# Patient Record
Sex: MALE | Race: WHITE | Employment: FULL TIME | URBAN - METROPOLITAN AREA
[De-identification: names, ages, dates, MRNs, and addresses within clinical notes are randomized per-mention and may not be internally consistent; named-entity substitution may affect disease eponyms.]

---

## 2021-05-21 ENCOUNTER — HOSPITAL ENCOUNTER (EMERGENCY)
Age: 51
Discharge: HOME | End: 2021-05-21
Attending: EMERGENCY MEDICINE

## 2021-05-21 VITALS
WEIGHT: 235 LBS | RESPIRATION RATE: 17 BRPM | BODY MASS INDEX: 31.83 KG/M2 | OXYGEN SATURATION: 97 % | SYSTOLIC BLOOD PRESSURE: 172 MMHG | HEIGHT: 72 IN | HEART RATE: 87 BPM | TEMPERATURE: 98.1 F | DIASTOLIC BLOOD PRESSURE: 92 MMHG

## 2021-05-21 DIAGNOSIS — I83.93 VARICOSE VEINS OF BOTH LOWER EXTREMITIES, UNSPECIFIED WHETHER COMPLICATED: Primary | ICD-10-CM

## 2021-05-21 DIAGNOSIS — I10 ASYMPTOMATIC HYPERTENSION: ICD-10-CM

## 2021-05-21 PROCEDURE — 99283 EMERGENCY DEPT VISIT LOW MDM: CPT

## 2021-05-21 RX ORDER — BACITRACIN, NEOMYCIN, POLYMYXIN B 400; 3.5; 5 [USP'U]/G; MG/G; [USP'U]/G
OINTMENT TOPICAL
Qty: 1 TUBE | Refills: 0 | Status: SHIPPED | OUTPATIENT
Start: 2021-05-21 | End: 2021-05-31

## 2021-05-21 NOTE — ED PROVIDER NOTES
(Non-Medical):    Physical Activity:     Days of Exercise per Week:     Minutes of Exercise per Session:    Stress:     Feeling of Stress :    Social Connections:     Frequency of Communication with Friends and Family:     Frequency of Social Gatherings with Friends and Family:     Attends [employer] Services:     Active Member of Clubs or Organizations:     Attends Club or Organization Meetings:     Marital Status:    Intimate Partner Violence:     Fear of Current or Ex-Partner:     Emotionally Abused:     Physically Abused:     Sexually Abused:      No current facility-administered medications for this encounter. Current Outpatient Medications   Medication Sig Dispense Refill    neomycin-bacitracin-polymyxin (NEOSPORIN) 400-5-5000 ointment Apply topically 2 times daily. 1 Tube 0     No Known Allergies    Nursing Notes Reviewed    Physical Exam:  ED Triage Vitals [05/21/21 1142]   Enc Vitals Group      BP (!) 182/100      Pulse 87      Resp 17      Temp 98.1 °F (36.7 °C)      Temp src       SpO2 97 %      Weight 235 lb (106.6 kg)      Height 6' (1.829 m)      Head Circumference       Peak Flow       Pain Score       Pain Loc       Pain Edu? Excl. in [address]? My pulse ox interpretation is - normal    General appearance:  No acute distress. Sitting comfortably in bed. Well-appearing. Pleasant. Skin:  Warm. Dry. Patient does have diffuse varicose veins to bilateral lower extremities. There is one punctate area on the right lateral leg with a scab present where there was a recent bleed. No active bleeding or open wounds or sores. Eye:  Extraocular movements intact. Ears, nose, mouth and throat:  Oral mucosa moist   Extremity:  No swelling. Normal ROM. Varicose vein findings as above. Heart:  Strong and symmetric peripheral pulses. Extremities are well perfused. Abdomen:  Non-distended. Respiratory:  Respirations nonlabored. Neurological:  Alert and oriented times 3.   No focal neuro deficits. Sensation intact to light touch to distal upper/lower extremities; 5/5 and symmetric  and dorsi/plantar flexion. I have reviewed and interpreted all of the currently available lab results from this visit (if applicable):  No results found for this visit on 05/21/21. Radiographs (if obtained):  [] The following radiograph was interpreted by myself in the absence of a radiologist:   [] Radiologist's Report Reviewed:  No orders to display         EKG (if obtained): (All EKG's are interpreted by myself in the absence of a cardiologist)    Chart review shows recent radiographs:  No results found. MDM:  Pt presents as above. Emergent conditions considered. Presentation prompted initial history and physical.  Presentation consistent with asymptomatic varicose veins and asymptomatic hypertension. Patient will be referred to vascular surgery for further evaluation. There are no red flag features prompting more emergent work-up at this time with respect to his veins or his hypertension. Patient will need recheck as an outpatient. Work excuse provided. I discussed specific signs and symptoms and when to return to the emergency department as well as need for close outpatient follow-up. Questions sought and answered with the patient. They voice understanding and agree with plan. Clinical Impression:  1. Varicose veins of both lower extremities, unspecified whether complicated    2. Asymptomatic hypertension      Disposition referral (if applicable):  Cherry Sanders MD  [address] [place] [id number]  [phone]    Schedule an appointment as soon as possible for a visit   (vascular surgery)    [address]  [phone]  Today  If symptoms worsen    Disposition medications (if applicable):  New Prescriptions    NEOMYCIN-BACITRACIN-POLYMYXIN (NEOSPORIN) 400-5-5000 OINTMENT    Apply topically 2 times daily. Comment: Please note this report has been produced using speech recognition software and may contain errors related to that system including errors in grammar, punctuation, and spelling, as well as words and phrases that may be inappropriate. If there are any questions or concerns please feel free to contact the dictating provider for clarification.          Gem Howell MD  05/21/21 9215

## 2021-05-21 NOTE — ED NOTES
Discharge instructions given with prescription verbalized understanding pt is ambulatory out       Jazmín Tena RN  05/21/21 5071

## 2021-05-21 NOTE — ED TRIAGE NOTES
Pt arrived via triage with c/o varicose veins. Pt reports they are getting larger and his work is concerned. Pt reports a couple have broken and bleed. Pt arrives with one area scabbed and healing. Pt denies any previous treatment. Pt denies pain.

## 2021-05-21 NOTE — LETTER
[address] [address]  Phone: [phone]  Fax: [phone]    No name on file. May 21, 2021     Patient: Tenisha Hardy   YOB: 1970   Date of Visit: 5/21/2021       To Whom It May Concern: It is my medical opinion that Tenisha Hardy may return to work on 05/22/21 with the following restrictions: avoid excessive walking or standing . Limited walking until he can be cleared by vascular surgery. Avoid trauma to the legs. If you have any questions or concerns, please don't hesitate to call.     Sincerely,      Robbi Piña MD

## 2021-07-24 ENCOUNTER — HOSPITAL ENCOUNTER (EMERGENCY)
Age: 51
Discharge: HOME | End: 2021-07-24
Attending: EMERGENCY MEDICINE
Payer: COMMERCIAL

## 2021-07-24 ENCOUNTER — APPOINTMENT (OUTPATIENT)
Dept: GENERAL RADIOLOGY | Age: 51
End: 2021-07-24
Payer: COMMERCIAL

## 2021-07-24 VITALS
HEIGHT: 72 IN | DIASTOLIC BLOOD PRESSURE: 87 MMHG | HEART RATE: 85 BPM | SYSTOLIC BLOOD PRESSURE: 145 MMHG | OXYGEN SATURATION: 94 % | BODY MASS INDEX: 31.83 KG/M2 | WEIGHT: 235 LBS | RESPIRATION RATE: 22 BRPM | TEMPERATURE: 96.7 F

## 2021-07-24 DIAGNOSIS — S82.832A CLOSED FRACTURE OF DISTAL END OF LEFT FIBULA, UNSPECIFIED FRACTURE MORPHOLOGY, INITIAL ENCOUNTER: Primary | ICD-10-CM

## 2021-07-24 PROCEDURE — 96374 THER/PROPH/DIAG INJ IV PUSH: CPT

## 2021-07-24 PROCEDURE — 29505 APPLICATION LONG LEG SPLINT: CPT

## 2021-07-24 PROCEDURE — 2580000003 HC RX 258: Performed by: EMERGENCY MEDICINE

## 2021-07-24 PROCEDURE — 6360000002 HC RX W HCPCS: Performed by: EMERGENCY MEDICINE

## 2021-07-24 PROCEDURE — 73610 X-RAY EXAM OF ANKLE: CPT

## 2021-07-24 PROCEDURE — 99284 EMERGENCY DEPT VISIT MOD MDM: CPT

## 2021-07-24 PROCEDURE — 73600 X-RAY EXAM OF ANKLE: CPT

## 2021-07-24 PROCEDURE — 96375 TX/PRO/DX INJ NEW DRUG ADDON: CPT

## 2021-07-24 RX ORDER — 0.9 % SODIUM CHLORIDE 0.9 %
1000 INTRAVENOUS SOLUTION INTRAVENOUS ONCE
Status: COMPLETED | OUTPATIENT
Start: 2021-07-24 | End: 2021-07-24

## 2021-07-24 RX ORDER — FENTANYL CITRATE 50 UG/ML
50 INJECTION, SOLUTION INTRAMUSCULAR; INTRAVENOUS ONCE
Status: COMPLETED | OUTPATIENT
Start: 2021-07-24 | End: 2021-07-24

## 2021-07-24 RX ORDER — PROPOFOL 10 MG/ML
1000 INJECTION, EMULSION INTRAVENOUS ONCE
Status: COMPLETED | OUTPATIENT
Start: 2021-07-24 | End: 2021-07-24

## 2021-07-24 RX ORDER — PROPOFOL 10 MG/ML
INJECTION, EMULSION INTRAVENOUS DAILY PRN
Status: COMPLETED | OUTPATIENT
Start: 2021-07-24 | End: 2021-07-24

## 2021-07-24 RX ORDER — ACETAMINOPHEN AND CODEINE PHOSPHATE 300; 30 MG/1; MG/1
1 TABLET ORAL EVERY 4 HOURS PRN
Qty: 15 TABLET | Refills: 0 | Status: SHIPPED | OUTPATIENT
Start: 2021-07-24 | End: 2021-07-28

## 2021-07-24 RX ADMIN — PROPOFOL 100 MG: 10 INJECTION, EMULSION INTRAVENOUS at 17:35

## 2021-07-24 RX ADMIN — FENTANYL CITRATE 50 MCG: 50 INJECTION, SOLUTION INTRAMUSCULAR; INTRAVENOUS at 17:32

## 2021-07-24 RX ADMIN — SODIUM CHLORIDE 1000 ML: 9 INJECTION, SOLUTION INTRAVENOUS at 17:32

## 2021-07-24 RX ADMIN — PROPOFOL 200 MG: 10 INJECTION, EMULSION INTRAVENOUS at 17:33

## 2021-07-24 ASSESSMENT — PAIN DESCRIPTION - FREQUENCY: FREQUENCY: INTERMITTENT

## 2021-07-24 ASSESSMENT — PAIN SCALES - GENERAL
PAINLEVEL_OUTOF10: 8
PAINLEVEL_OUTOF10: 8

## 2021-07-24 ASSESSMENT — PAIN DESCRIPTION - ORIENTATION: ORIENTATION: LEFT

## 2021-07-24 ASSESSMENT — PAIN DESCRIPTION - PAIN TYPE: TYPE: ACUTE PAIN

## 2021-07-24 ASSESSMENT — PAIN DESCRIPTION - LOCATION: LOCATION: KNEE

## 2021-07-24 ASSESSMENT — PAIN DESCRIPTION - DESCRIPTORS: DESCRIPTORS: ACHING

## 2021-07-24 NOTE — ED PROVIDER NOTES
EMERGENCY DEPARTMENT ENCOUNTER      CHIEF COMPLAINT:   Left ankle pain    HPI: Lior Porter is a 54 y.o. male who presents to the Emergency Department complaining of left ankle pain. The patient states that he was walking downstairs last night when he missed the last step and fell. He had immediate pain to the left his left ankle. It has been increasingly painful and swollen throughout the day. It is achy and throbbing. The pain is constant. It is worse with movement and better with rest.  He denies any associated numbness, tingling or weakness. He did not hit his head, lose consciousness or sustain any other injury. He denies neck or back pain. He has been using crutches at home to ambulate and cannot bear weight. The patient denies fevers, chills, chest pain, shortness of breath, abdominal pain, numbness, tingling, weakness, or any other complaints. REVIEW OF SYSTEMS:  CONSTITUTIONAL:  Denies fever, chills, weight loss or weakness  EYES:  Denies photophobia or discharge  ENT:  Denies sore throat or ear pain  CARDIOVASCULAR:  Denies chest pain, palpitations or swelling  RESPIRATORY:  Denies cough or shortness of breath  GI: Denies abdominal pain, nausea, vomiting, or diarrhea  MUSCULOSKELETAL: See HPI  SKIN:  No rash  NEUROLOGIC:  Denies headache, focal weakness or sensory changes  All systems negative except as marked. \"Remaining review of systems reviewed and negative. I have reviewed the nursing triage documentation and agree unless otherwise noted below. \"    PAST MEDICAL HISTORY:   History reviewed. No pertinent past medical history. CURRENT MEDICATIONS:   Home medications reviewed. SURGICAL HISTORY:   History reviewed. No pertinent surgical history. FAMILY HISTORY:   No family history on file.     SOCIAL HISTORY:   Social History     Socioeconomic History    Marital status:      Spouse name: Not on file    Number of children: Not on file    Years of education: Not on file    Highest education level: Not on file   Occupational History    Not on file   Tobacco Use    Smoking status: Never Smoker    Smokeless tobacco: Never Used   Vaping Use    Vaping Use: Never used   Substance and Sexual Activity    Alcohol use: Not on file    Drug use: Never    Sexual activity: Not on file   Other Topics Concern    Not on file   Social History Narrative    Not on file     Social Determinants of Health     Financial Resource Strain:     Difficulty of Paying Living Expenses:    Food Insecurity:     Worried About Running Out of Food in the Last Year:     [address] in the Last Year:    Transportation Needs:     Lack of Transportation (Medical):  Lack of Transportation (Non-Medical):    Physical Activity:     Days of Exercise per Week:     Minutes of Exercise per Session:    Stress:     Feeling of Stress :    Social Connections:     Frequency of Communication with Friends and Family:     Frequency of Social Gatherings with Friends and Family:     Attends Orthodox Services:     Active Member of Clubs or Organizations:     Attends Club or Organization Meetings:     Marital Status:    Intimate Partner Violence:     Fear of Current or Ex-Partner:     Emotionally Abused:     Physically Abused:     Sexually Abused: ALLERGIES: Patient has no known allergies. PHYSICAL EXAM:  VITAL SIGNS:   ED Triage Vitals [07/24/21 1457]   Enc Vitals Group      BP (!) 168/93      Pulse 99      Resp 14      Temp 96.7 °F (35.9 °C)      Temp Source Skin      SpO2 96 %      Weight 235 lb (106.6 kg)      Height 6' (1.829 m)      Head Circumference       Peak Flow       Pain Score       Pain Loc       Pain Edu? Excl. in [address]?       Constitutional:  Non-toxic appearance  HENT: Normocephalic, Atraumatic  Eyes: PERRL, conjunctiva normal   Neck: Normal range of motion, No tenderness, Supple, No stridor, No lymphadenopathy  Cardiovascular:  Normal heart rate, Normal rhythm  Pulmonary/Chest: Normal breath sounds, No respiratory distress, No wheezing  Abdomen: Bowel sounds normal, Soft, No tenderness, No masses, No pulsatile masses  Back:  No tenderness, No CVA tenderness  Extremities: There is diffuse swelling of the left lateral and medial ankle, there is venous discoloration to both ankles, the peripheral pulses are strong, Capillary refill is brisk, there is normal motor and sensory function, the foot is pink, warm, and well perfused, there is no cyanosis  Skin:  Warm, Dry, No erythema, No rash      EKG:     None    Radiology / Procedures:  Conscious Sedation Procedure Note  Indication: ankle fracture  Consent: I have discussed with the patient and/or the patient representative the indication, alternatives, and the possible risks and/or complications of the planned procedure and the anesthesia methods. The patient and/or patient representative appear to understand and agree to proceed. Physician Involvement: The attending physician was present and supervising this procedure. Pre-Sedation Documentation and Exam: I have personally completed a history, physical exam & review of systems for this patient (see notes). Airway Assessment: normal, dentition not prohibitive, Mallampati Class II - (soft palate, fauces & uvula are visible)    Prior History of Anesthesia Complications: none    ASA Classification: Class 1 - A normal healthy patient    Sedation/ Anesthesia Plan: intravenous sedation    Medications Used: fentanyl intravenously and propofol intravenously  Monitoring and Safety: The patient was placed on a cardiac monitor and vital signs, pulse oximetry and level of consciousness were continuously evaluated throughout the procedure. The patient was closely monitored until recovery from the medications was complete and the patient had returned to baseline status. Respiratory therapy was on standby at all times during the procedure.     (The following sections must be completed)  Post-Sedation Vital Signs: Vital signs were reviewed and were stable after the procedure (see flow sheet for vitals)     Post-Sedation Exam: Lungs: clear to auscultation bilaterally and Cardiovascular: normal, regular rate and rhythm        Complications: none    Posterior leg splint with a footplate and sugar tong was placed on the left lower extremity by me with assistance. The patient was neurovascularly intact prior to and after splint placement.            XR ANKLE LEFT (2 VIEWS) (Final result)  Result time 07/24/21 18:11:16  Procedure changed from XR ANKLE LEFT (MIN 3 VIEWS)  Final result by Nova Herbert MD (07/24/21 18:11:16)                Impression:    Similar distal fibular fracture with slightly improved alignment of the   mortise though there is persistent widening of the medial clear space.              Narrative:    EXAMINATION:   2 XRAY VIEWS OF THE LEFT ANKLE     7/24/2021 5:50 pm     COMPARISON:   07/24/2021     HISTORY:   ORDERING SYSTEM PROVIDED HISTORY: post reduction   TECHNOLOGIST PROVIDED HISTORY:   Reason for exam:->post reduction   Reason for Exam: post reduction   Acuity: Acute   Type of Exam: Subsequent/Follow-up   Mechanism of Injury: twisted ankle going down stairs     FINDINGS:   Similar distal fibular fracture with slightly improved alignment of the   mortise the there is persistent widening of the medial clear space.  The   talar dome appears intact.  Similar soft tissue swelling.                     XR ANKLE LEFT (MIN 3 VIEWS) (Final result)  Result time 07/24/21 16:31:29  Final result by Marielle Menezes MD (07/24/21 16:31:29)                Impression:    Acute traumatic displaced and mildly comminuted lateral malleolar fracture at   the level of the distal syndesmosis/tibiotalar joint.  Associated disruption   of the ankle mortise with widening of the medial clear space compatible with   ligamentous injury.  There also appear to be some small likely acute avulsion   fracture fragments in the medial clear space adjacent to the medial talus. Associated diffuse soft tissue swelling/subcutaneous edema about the distal   leg and ankle.  No radiopaque foreign bodies or soft tissue gas. Narrative:    EXAMINATION:   THREE XRAY VIEWS OF THE LEFT ANKLE     7/24/2021 3:24 pm     COMPARISON:   None. HISTORY:   ORDERING SYSTEM PROVIDED HISTORY: trauma, left ankle pain   TECHNOLOGIST PROVIDED HISTORY:   Reason for exam:->trauma, left ankle pain   Reason for Exam: trauma, left ankle pain   Acuity: Acute   Type of Exam: Initial   Mechanism of Injury: states that he missed the last step earlier this am and   twisted his left ankle   Relevant Medical/Surgical History: left ankle pain and swelling     FINDINGS:   Acute traumatic displaced and mildly comminuted lateral malleolar fracture at   the level of the distal syndesmosis/tibiotalar joint.  Disruption of the   ankle mortise relationships with widening of the medial clear space.  There   are some tiny ossifications noted in the medial clear space likely reflecting   small avulsion fracture fragments, potentially acute.  No dislocations.  No   suspicious focal bony lesions. No degenerative changes noted. Soft tissue swelling and subcutaneous edema diffusely about the distal leg   and ankle.  No radiopaque foreign bodies or soft tissue gas. ED COURSE & MEDICAL DECISION MAKING:  Pertinent Labs & Imaging studies reviewed. (See chart for details)  On exam, the patient is afebrile and nontoxic appearing. He is hypertensive with no known history of hypertension but is asymptomatic and is instructed to have this rechecked as an outpatient. He is otherwise hemodynamically stable and neurologically intact. X-rays of the left ankle show a displaced, comminuted lateral malleolar fracture at the level of the distal syndesmosis/tibiotalar joint.   There is associated disruption of the ankle mortise with widening of the medial clear space compatible with ligamentous injury. There also appears to be a small avulsion fracture fragment in the medial clear space adjacent to the medial talus. The patient underwent procedural sedation and closed reduction with improvement of alignment. He was placed in a splint by me and was neurovascularly intact prior to and after splint placement. I suspect that the patient had a mechanical fall and sustained a closed fracture of the distal end of the left fibula. . I have a low suspicion for open fracture, dislocation, compartment syndrome, necrotizing fasciitis, or neurovascular injury. I feel that the patient is stable for outpatient management with follow up in 2-3 days. I discussed the case with the orthopedic surgeon on-call, Dr. Kenn Shin, who will see him in close follow-up. He is to be nonweightbearing. RICE is advised. He is to return to the Emergency Department immediately for increased pain, swelling, redness, warmth, numbness, tingling, or weakness. The patient verbalized understanding, was agreeable with plan, and the patient was discharged home in stable condition. Clinical Impression:  1. Closed fracture of distal end of left fibula, unspecified fracture morphology, initial encounter        Disposition referral (if applicable):  Wilma Mendoza MD  [address]  [phone]    Schedule an appointment as soon as possible for a visit in 3 days      [facility] [id number] [facility]  [phone]  Go to   If symptoms worsen      Disposition medications (if applicable):  Discharge Medication List as of 7/24/2021  6:13 PM      START taking these medications    Details   acetaminophen-codeine (TYLENOL/CODEINE #3) 300-30 MG per tablet Take 1 tablet by mouth every 4 hours as needed for Pain for up to 4 days. Intended supply: 3 days.  Take lowest dose possible to manage pain, Disp-15 tablet, R-0Normal Comment: Please note this report has been produced using speech recognition software and may contain errors related to that system including errors in grammar, punctuation, and spelling, as well as words and phrases that may be inappropriate. If there are any questions or concerns please feel free to contact the dictating provider for clarification.         Vickye Mcardle, MD  07/24/21 2029

## 2021-07-24 NOTE — ED TRIAGE NOTES
Pt arrives with complaints of left ankle injury.  He states that he missed the last step earlier this am and twisted his left ankle, swelling noted

## 2021-07-27 ENCOUNTER — OFFICE VISIT (OUTPATIENT)
Dept: ORTHOPEDIC SURGERY | Age: 51
End: 2021-07-27
Payer: COMMERCIAL

## 2021-07-27 VITALS
RESPIRATION RATE: 16 BRPM | HEIGHT: 72 IN | HEART RATE: 100 BPM | BODY MASS INDEX: 31.83 KG/M2 | OXYGEN SATURATION: 98 % | WEIGHT: 235 LBS

## 2021-07-27 DIAGNOSIS — S82.62XA CLOSED DISPLACED FRACTURE OF LATERAL MALLEOLUS OF LEFT FIBULA, INITIAL ENCOUNTER: Primary | ICD-10-CM

## 2021-07-27 PROCEDURE — 99203 OFFICE O/P NEW LOW 30 MIN: CPT | Performed by: ORTHOPAEDIC SURGERY

## 2021-07-27 NOTE — PATIENT INSTRUCTIONS
We will schedule surgery at soonest convenience. If you have any questions regarding your surgery please call our office and ask to speak with Radha.  [phone]    Surgery: left ankle ORIF   Date:_____________________

## 2021-07-27 NOTE — PROGRESS NOTES
Patient presents to the office today for ED FU of the left ankle fx and dislocation DOI 7/24/21. Pt states he fell down the stairs and twisted his left ankle. Pt states he instantly had pain in the left ankle. Pain today is a 3/10 he has been nonweightbearing on the left ankle. Pt states pain is along the medial aspect of the left ankle.

## 2021-07-28 ENCOUNTER — ANESTHESIA EVENT (OUTPATIENT)
Dept: OPERATING ROOM | Age: 51
End: 2021-07-28
Payer: COMMERCIAL

## 2021-07-28 PROBLEM — S82.62XA CLOSED DISPLACED FRACTURE OF LATERAL MALLEOLUS OF LEFT FIBULA: Status: ACTIVE | Noted: 2021-07-28

## 2021-07-28 ASSESSMENT — ENCOUNTER SYMPTOMS
COLOR CHANGE: 0
CHEST TIGHTNESS: 0
VOMITING: 0
EYE REDNESS: 0
EYE PAIN: 0
WHEEZING: 0
SHORTNESS OF BREATH: 0

## 2021-07-28 NOTE — PROGRESS NOTES
7/27/2021   Chief Complaint   Patient presents with    Ankle Injury     left ankle distal fibula fx, dislocation DOI 7/24/21        History of Present Illness:                             Cesia Gonzalez is a 54 y.o. male who presents today for evaluation of his left ankle pain and swelling. He had an injury on 7/24/2021 when he was walking down the stairs and twisted awkwardly onto his left ankle. He had immediate severe pain and swelling with inability to bear weight. He was seen in emergency room and x-rays were taken which revealed a unstable fracture of his ankle. He had a closed reduction maneuver performed and application of a splint. Is here today for further evaluation. No history of previous injuries to the ankle. He ambulated well prior to the fall. Pain is diffuse throughout the ankle but most significant on the lateral aspect. He reports no other injuries or areas of pain. Patient presents to the office today for ED FU of the left ankle fx and dislocation DOI 7/24/21. Pt states he fell down the stairs and twisted his left ankle. Pt states he instantly had pain in the left ankle. Pain today is a 3/10 he has been nonweightbearing on the left ankle. Pt states pain is along the medial aspect of the left ankle. Medical History  Patient's medications, allergies, past medical, surgical, social and family histories were reviewed and updated as appropriate. No past medical history on file. No past surgical history on file. No family history on file.   Social History     Socioeconomic History    Marital status:      Spouse name: Not on file    Number of children: Not on file    Years of education: Not on file    Highest education level: Not on file   Occupational History    Not on file   Tobacco Use    Smoking status: Never Smoker    Smokeless tobacco: Never Used   Vaping Use    Vaping Use: Never used   Substance and Sexual Activity    Alcohol use: Not on file    Drug use: Never    Sexual activity: Not on file   Other Topics Concern    Not on file   Social History Narrative    Not on file     Social Determinants of Health     Financial Resource Strain:     Difficulty of Paying Living Expenses:    Food Insecurity:     Worried About Running Out of Food in the Last Year:     [address] in the Last Year:    Transportation Needs:     Lack of Transportation (Medical):  Lack of Transportation (Non-Medical):    Physical Activity:     Days of Exercise per Week:     Minutes of Exercise per Session:    Stress:     Feeling of Stress :    Social Connections:     Frequency of Communication with Friends and Family:     Frequency of Social Gatherings with Friends and Family:     Attends [employer] Services:     Active Member of Clubs or Organizations:     Attends Club or Organization Meetings:     Marital Status:    Intimate Partner Violence:     Fear of Current or Ex-Partner:     Emotionally Abused:     Physically Abused:     Sexually Abused:      Current Outpatient Medications   Medication Sig Dispense Refill    acetaminophen-codeine (TYLENOL/CODEINE #3) 300-30 MG per tablet Take 1 tablet by mouth every 4 hours as needed for Pain for up to 4 days. Intended supply: 3 days. Take lowest dose possible to manage pain 15 tablet 0     No current facility-administered medications for this visit. No Known Allergies      Review of Systems   Constitutional: Negative for chills and fever. HENT: Negative for congestion and sneezing. Eyes: Negative for pain and redness. Respiratory: Negative for chest tightness, shortness of breath and wheezing. Cardiovascular: Negative for chest pain and palpitations. Gastrointestinal: Negative for vomiting. Musculoskeletal: Positive for arthralgias. Skin: Negative for color change and rash. Neurological: Negative for weakness and numbness. Psychiatric/Behavioral: Negative for agitation.  The patient is not nervous/anxious. Examination:  General Exam:  Vitals: Pulse 100   Resp 16   Ht 6' (1.829 m)   Wt 235 lb (106.6 kg)   SpO2 98%   BMI 31.87 kg/m²    Physical Exam  Vitals and nursing note reviewed. Constitutional:       Appearance: Normal appearance. HENT:      Head: Normocephalic and atraumatic. Eyes:      Conjunctiva/sclera: Conjunctivae normal.      Pupils: Pupils are equal, round, and reactive to light. Pulmonary:      Effort: Pulmonary effort is normal.   Musculoskeletal:      Cervical back: Normal range of motion. Left knee: No swelling, effusion or ecchymosis. Normal range of motion. No tenderness. Right ankle: No swelling, deformity, ecchymosis or lacerations. No tenderness. Normal range of motion. Normal pulse. Right Achilles Tendon: Normal.      Comments: Left lower extremity:  There is tenderness to palpation diffusely throughout the ankle medially and laterally but severe maximal tenderness overlying the lateral malleolus. There is moderate diffuse soft tissue swelling present medially and laterally at the ankle. There is ecchymosis present at the ankle both medially and laterally. No tenderness to palpation at the proximal leg. Mild tenderness over the anterior syndesmosis. Range of motion deferred due to known fracture. Brisk cap refill and 2+ DP pulse present. Sensation is intact to light touch but decreased due to swelling throughout the foot. There is mild deformity of the ankle from the displacement of the fracture. Skin is intact with no open wounds or lesions. No tenderness to palpation at the knee. Skin:     General: Skin is warm and dry. Neurological:      Mental Status: He is alert and oriented to person, place, and time.    Psychiatric:         Mood and Affect: Mood normal.         Behavior: Behavior normal.            Diagnostic testing:  X-ray images were reviewed by myself and discussed with the patient:  Her images before and after reduction from 7/24/2021 in the ER reviewed by myself discussed with the patient:  Impression   Acute traumatic displaced and mildly comminuted lateral malleolar fracture at   the level of the distal syndesmosis/tibiotalar joint.  Associated disruption   of the ankle mortise with widening of the medial clear space compatible with   ligamentous injury.  There also appear to be some small likely acute avulsion   fracture fragments in the medial clear space adjacent to the medial talus.       Associated diffuse soft tissue swelling/subcutaneous edema about the distal   leg and ankle.  No radiopaque foreign bodies or soft tissue gas.             Office Procedures:  No orders of the defined types were placed in this encounter. Assessment and Plan  1. Left ankle displaced comminuted distal fibula fracture, bimalleolar equivalent    I reviewed the x-ray findings in detail with the patient. I have explained that the fracture pattern and displacement may lead to complications with nonoperative treatment. Specifically I am concerned about malalignment and posttraumatic arthritis of the ankle joint. Discussed possibility for syndesmotic disruption and possible need to stabilize with syndesmotic screws. Discussed potential need for hardware removal in the future. We discussed the potential for medial deltoid ligament repair if there is instability at the medial aspect of the ankle following fixation of the fibula fracture. Therefore I have recommended open reduction and internal fixation to restore the anatomy of the ankle bones and articular surfaces. We have discussed the risks, benefits, and alternatives to surgery. We discussed the goals of surgery and anticipated recovery process. We discussed specific pertinent risks including delayed fracture healing, nonunion, refracture, posttraumatic arthritis, infection, and DVT.     The patient understands and wishes to proceed with surgical intervention.       Electronically signed by Juan Carlos Hernández MD on 7/28/2021 at 9:50 AM

## 2021-07-29 ASSESSMENT — LIFESTYLE VARIABLES: SMOKING_STATUS: 0

## 2021-07-29 NOTE — PROGRESS NOTES
Called Dr Armsa Drafts office to see if they have another # for this pt, told to call wife Waylon Long at [phone]- msg line- did not identify self so just left [facility] [place], [place] to call us back about surgical procedure for 7/30/2021

## 2021-07-29 NOTE — PROGRESS NOTES
Attempted phone assessment- message line- did leave msg surg 7/30 @ 0730 with arrival at [address] [facility] after midnight bring insurance an picture ID, covid vaccination card, med list

## 2021-07-29 NOTE — ANESTHESIA PRE PROCEDURE
Department of Anesthesiology  Preprocedure Note       Name:  Lubna Brice   Age:  54 y.o.  :  1970                                          MRN:  [id number]         Date:  2021      Surgeon: Paula Ruiz):  Saima Beasley MD    Procedure: Procedure(s):  LEFT ANKLE OPEN REDUCTION INTERNAL FIXATION    Medications prior to admission:   Prior to Admission medications    Not on File       Current medications:    No current facility-administered medications for this encounter. No current outpatient medications on file. Allergies:  No Known Allergies    Problem List:    Patient Active Problem List   Diagnosis Code    Closed displaced fracture of lateral malleolus of left fibula S82. 62XA       Past Medical History:        Diagnosis Date    Fracture     in ER 2021 fx left ankle- for surg 2021       Past Surgical History:  No past surgical history on file. Social History:    Social History     Tobacco Use    Smoking status: Never Smoker    Smokeless tobacco: Never Used   Substance Use Topics    Alcohol use: Not on file                                Counseling given: Not Answered      Vital Signs (Current): There were no vitals filed for this visit. BP Readings from Last 3 Encounters:   21 (!) 145/87   21 (!) 172/92       NPO Status:                                                                                 BMI:   Wt Readings from Last 3 Encounters:   21 235 lb (106.6 kg)   21 235 lb (106.6 kg)   21 235 lb (106.6 kg)     There is no height or weight on file to calculate BMI.    CBC: No results found for: WBC, RBC, HGB, HCT, MCV, RDW, PLT    CMP: No results found for: NA, K, CL, CO2, BUN, CREATININE, GFRAA, AGRATIO, LABGLOM, GLUCOSE, PROT, CALCIUM, BILITOT, ALKPHOS, AST, ALT    POC Tests: No results for input(s): POCGLU, POCNA, POCK, POCCL, POCBUN, POCHEMO, POCHCT in the last 72 hours.     Coags: No results found for: PROTIME, INR, APTT    HCG (If Applicable): No results found for: PREGTESTUR, PREGSERUM, HCG, HCGQUANT     ABGs: No results found for: PHART, PO2ART, FKR8VLC, NMM6SPH, BEART, X3RYOZSU     Type & Screen (If Applicable):  No results found for: LABABO, LABRH    Drug/Infectious Status (If Applicable):  No results found for: HIV, HEPCAB    COVID-19 Screening (If Applicable): No results found for: COVID19        Anesthesia Evaluation  Patient summary reviewed and Nursing notes reviewed no history of anesthetic complications:   Airway: Mallampati: II  TM distance: >3 FB   Neck ROM: full  Mouth opening: > = 3 FB Dental:          Pulmonary:Negative Pulmonary ROS and normal exam    (+) sleep apnea:      (-) not a current smoker                           Cardiovascular:  Exercise tolerance: good (>4 METS),            Beta Blocker:  Not on Beta Blocker         Neuro/Psych:   Negative Neuro/Psych ROS              GI/Hepatic/Renal: Neg GI/Hepatic/Renal ROS            Endo/Other: Negative Endo/Other ROS                    Abdominal:             Vascular: negative vascular ROS. Other Findings:           Anesthesia Plan      general     ASA 2     (+COVID vaccine  Chart review complete 07/29/2021)  Induction: intravenous. MIPS: Postoperative opioids intended. Anesthetic plan and risks discussed with patient. Plan discussed with CRNA.     Attending anesthesiologist reviewed and agrees with Pre Eval content          KISHA Arnold - CRNA   7/29/2021

## 2021-07-30 ENCOUNTER — HOSPITAL ENCOUNTER (OUTPATIENT)
Age: 51
Setting detail: OUTPATIENT SURGERY
Discharge: HOME | End: 2021-07-30
Attending: ORTHOPAEDIC SURGERY | Admitting: ORTHOPAEDIC SURGERY
Payer: COMMERCIAL

## 2021-07-30 ENCOUNTER — ANESTHESIA (OUTPATIENT)
Dept: OPERATING ROOM | Age: 51
End: 2021-07-30
Payer: COMMERCIAL

## 2021-07-30 ENCOUNTER — TELEPHONE (OUTPATIENT)
Dept: ORTHOPEDIC SURGERY | Age: 51
End: 2021-07-30

## 2021-07-30 ENCOUNTER — APPOINTMENT (OUTPATIENT)
Dept: GENERAL RADIOLOGY | Age: 51
End: 2021-07-30
Attending: ORTHOPAEDIC SURGERY
Payer: COMMERCIAL

## 2021-07-30 VITALS
OXYGEN SATURATION: 98 % | TEMPERATURE: 97.3 F | DIASTOLIC BLOOD PRESSURE: 79 MMHG | SYSTOLIC BLOOD PRESSURE: 157 MMHG | HEIGHT: 72 IN | RESPIRATION RATE: 16 BRPM | WEIGHT: 230 LBS | BODY MASS INDEX: 31.15 KG/M2 | HEART RATE: 94 BPM

## 2021-07-30 VITALS
RESPIRATION RATE: 1 BRPM | SYSTOLIC BLOOD PRESSURE: 135 MMHG | TEMPERATURE: 98.6 F | OXYGEN SATURATION: 96 % | DIASTOLIC BLOOD PRESSURE: 82 MMHG

## 2021-07-30 DIAGNOSIS — S82.842A BIMALLEOLAR FRACTURE OF LEFT ANKLE, CLOSED, INITIAL ENCOUNTER: Primary | ICD-10-CM

## 2021-07-30 PROBLEM — S82.62XA CLOSED DISPLACED FRACTURE OF LATERAL MALLEOLUS OF LEFT FIBULA: Status: RESOLVED | Noted: 2021-07-28 | Resolved: 2021-07-30

## 2021-07-30 LAB
ALBUMIN SERPL-MCNC: 4.1 GM/DL (ref 3.4–5)
ALP BLD-CCNC: 130 IU/L (ref 40–128)
ALT SERPL-CCNC: 29 U/L (ref 10–40)
ANION GAP SERPL CALCULATED.3IONS-SCNC: 16 MMOL/L (ref 4–16)
APTT: 30.8 SECONDS (ref 25.1–37.1)
AST SERPL-CCNC: 34 IU/L (ref 15–37)
BILIRUB SERPL-MCNC: 0.6 MG/DL (ref 0–1)
BUN BLDV-MCNC: 6 MG/DL (ref 6–23)
CALCIUM SERPL-MCNC: 9.3 MG/DL (ref 8.3–10.6)
CHLORIDE BLD-SCNC: 89 MMOL/L (ref 99–110)
CO2: 27 MMOL/L (ref 21–32)
CREAT SERPL-MCNC: 0.5 MG/DL (ref 0.9–1.3)
GFR AFRICAN AMERICAN: >60 ML/MIN/1.73M2
GFR NON-AFRICAN AMERICAN: >60 ML/MIN/1.73M2
GLUCOSE BLD-MCNC: 120 MG/DL (ref 70–99)
HCT VFR BLD CALC: 53.1 % (ref 42–52)
HEMOGLOBIN: 18 GM/DL (ref 13.5–18)
INR BLD: 0.88 INDEX
MCH RBC QN AUTO: 33.1 PG (ref 27–31)
MCHC RBC AUTO-ENTMCNC: 33.9 % (ref 32–36)
MCV RBC AUTO: 97.8 FL (ref 78–100)
PDW BLD-RTO: 14.2 % (ref 11.7–14.9)
PLATELET # BLD: 274 K/CU MM (ref 140–440)
PMV BLD AUTO: 9.2 FL (ref 7.5–11.1)
POTASSIUM SERPL-SCNC: 5 MMOL/L (ref 3.5–5.1)
PROTHROMBIN TIME: 11.4 SECONDS (ref 11.7–14.5)
RBC # BLD: 5.43 M/CU MM (ref 4.6–6.2)
SODIUM BLD-SCNC: 132 MMOL/L (ref 135–145)
TOTAL PROTEIN: 7.4 GM/DL (ref 6.4–8.2)
WBC # BLD: 7 K/CU MM (ref 4–10.5)

## 2021-07-30 PROCEDURE — 3700000000 HC ANESTHESIA ATTENDED CARE: Performed by: ORTHOPAEDIC SURGERY

## 2021-07-30 PROCEDURE — 3600000014 HC SURGERY LEVEL 4 ADDTL 15MIN: Performed by: ORTHOPAEDIC SURGERY

## 2021-07-30 PROCEDURE — 6370000000 HC RX 637 (ALT 250 FOR IP): Performed by: ANESTHESIOLOGY

## 2021-07-30 PROCEDURE — 76000 FLUOROSCOPY <1 HR PHYS/QHP: CPT

## 2021-07-30 PROCEDURE — 7100000001 HC PACU RECOVERY - ADDTL 15 MIN: Performed by: ORTHOPAEDIC SURGERY

## 2021-07-30 PROCEDURE — 27814 TREATMENT OF ANKLE FRACTURE: CPT | Performed by: ORTHOPAEDIC SURGERY

## 2021-07-30 PROCEDURE — 7100000000 HC PACU RECOVERY - FIRST 15 MIN: Performed by: ORTHOPAEDIC SURGERY

## 2021-07-30 PROCEDURE — 6360000002 HC RX W HCPCS: Performed by: NURSE ANESTHETIST, CERTIFIED REGISTERED

## 2021-07-30 PROCEDURE — 7100000010 HC PHASE II RECOVERY - FIRST 15 MIN: Performed by: ORTHOPAEDIC SURGERY

## 2021-07-30 PROCEDURE — 2580000003 HC RX 258: Performed by: ANESTHESIOLOGY

## 2021-07-30 PROCEDURE — C1713 ANCHOR/SCREW BN/BN,TIS/BN: HCPCS | Performed by: ORTHOPAEDIC SURGERY

## 2021-07-30 PROCEDURE — 85027 COMPLETE CBC AUTOMATED: CPT

## 2021-07-30 PROCEDURE — 85610 PROTHROMBIN TIME: CPT

## 2021-07-30 PROCEDURE — 2720000010 HC SURG SUPPLY STERILE: Performed by: ORTHOPAEDIC SURGERY

## 2021-07-30 PROCEDURE — 3700000001 HC ADD 15 MINUTES (ANESTHESIA): Performed by: ORTHOPAEDIC SURGERY

## 2021-07-30 PROCEDURE — 2709999900 HC NON-CHARGEABLE SUPPLY: Performed by: ORTHOPAEDIC SURGERY

## 2021-07-30 PROCEDURE — 7100000011 HC PHASE II RECOVERY - ADDTL 15 MIN: Performed by: ORTHOPAEDIC SURGERY

## 2021-07-30 PROCEDURE — 6360000002 HC RX W HCPCS: Performed by: ORTHOPAEDIC SURGERY

## 2021-07-30 PROCEDURE — 80053 COMPREHEN METABOLIC PANEL: CPT

## 2021-07-30 PROCEDURE — 3600000004 HC SURGERY LEVEL 4 BASE: Performed by: ORTHOPAEDIC SURGERY

## 2021-07-30 PROCEDURE — 85730 THROMBOPLASTIN TIME PARTIAL: CPT

## 2021-07-30 DEVICE — SCREW BNE L22MM DIA3.5MM CORT S STL ST NONCANNULATED LOK
Type: IMPLANTABLE DEVICE | Site: ANKLE | Status: NON-FUNCTIONAL
Removed: 2022-01-19

## 2021-07-30 DEVICE — SCREW BNE L10MM DIA2.7MM CORT S STL ST LOK FULL THRD T8
Type: IMPLANTABLE DEVICE | Site: ANKLE | Status: NON-FUNCTIONAL
Removed: 2022-01-19

## 2021-07-30 DEVICE — PLATE BNE L86MM 4 H ST L LAT DST FIBULAR S STL LOK COMPR
Type: IMPLANTABLE DEVICE | Site: ANKLE | Status: NON-FUNCTIONAL
Removed: 2022-01-19

## 2021-07-30 DEVICE — SCREW BNE L16MM DIA2.7MM CORT S STL ST LOK FULL THRD T8
Type: IMPLANTABLE DEVICE | Site: ANKLE | Status: NON-FUNCTIONAL
Removed: 2022-01-19

## 2021-07-30 DEVICE — SCREW BNE L14MM DIA2.7MM CORT S STL ST LOK FULL THRD T8
Type: IMPLANTABLE DEVICE | Site: ANKLE | Status: NON-FUNCTIONAL
Removed: 2022-01-19

## 2021-07-30 DEVICE — SCREW BNE L16MM DIA3.5MM CORT S STL ST LOK FULL THRD
Type: IMPLANTABLE DEVICE | Site: ANKLE | Status: NON-FUNCTIONAL
Removed: 2022-01-19

## 2021-07-30 DEVICE — SCREW BNE L16MM DIA3.5MM CORT S STL ST NONCANNULATED LOK
Type: IMPLANTABLE DEVICE | Site: ANKLE | Status: NON-FUNCTIONAL
Removed: 2022-01-19

## 2021-07-30 DEVICE — SCREW BNE L14MM DIA3.5MM CORT S STL ST NONCANNULATED LOK
Type: IMPLANTABLE DEVICE | Site: ANKLE | Status: NON-FUNCTIONAL
Removed: 2022-01-19

## 2021-07-30 RX ORDER — ONDANSETRON 2 MG/ML
4 INJECTION INTRAMUSCULAR; INTRAVENOUS
Status: DISCONTINUED | OUTPATIENT
Start: 2021-07-30 | End: 2021-07-30 | Stop reason: HOSPADM

## 2021-07-30 RX ORDER — DEXAMETHASONE SODIUM PHOSPHATE 4 MG/ML
INJECTION, SOLUTION INTRA-ARTICULAR; INTRALESIONAL; INTRAMUSCULAR; INTRAVENOUS; SOFT TISSUE PRN
Status: DISCONTINUED | OUTPATIENT
Start: 2021-07-30 | End: 2021-07-30 | Stop reason: SDUPTHER

## 2021-07-30 RX ORDER — ASPIRIN 325 MG
325 TABLET, DELAYED RELEASE (ENTERIC COATED) ORAL DAILY
Qty: 30 TABLET | Refills: 0 | Status: SHIPPED | OUTPATIENT
Start: 2021-07-30 | End: 2022-08-05

## 2021-07-30 RX ORDER — SODIUM CHLORIDE, SODIUM LACTATE, POTASSIUM CHLORIDE, CALCIUM CHLORIDE 600; 310; 30; 20 MG/100ML; MG/100ML; MG/100ML; MG/100ML
INJECTION, SOLUTION INTRAVENOUS CONTINUOUS
Status: DISCONTINUED | OUTPATIENT
Start: 2021-07-30 | End: 2021-07-30 | Stop reason: HOSPADM

## 2021-07-30 RX ORDER — HYDRALAZINE HYDROCHLORIDE 20 MG/ML
5 INJECTION INTRAMUSCULAR; INTRAVENOUS EVERY 10 MIN PRN
Status: DISCONTINUED | OUTPATIENT
Start: 2021-07-30 | End: 2021-07-30 | Stop reason: HOSPADM

## 2021-07-30 RX ORDER — ACETAMINOPHEN AND CODEINE PHOSPHATE 300; 30 MG/1; MG/1
1 TABLET ORAL EVERY 4 HOURS PRN
Qty: 30 TABLET | Refills: 0 | Status: SHIPPED | OUTPATIENT
Start: 2021-07-30 | End: 2021-08-06

## 2021-07-30 RX ORDER — 0.9 % SODIUM CHLORIDE 0.9 %
500 INTRAVENOUS SOLUTION INTRAVENOUS
Status: DISCONTINUED | OUTPATIENT
Start: 2021-07-30 | End: 2021-07-30 | Stop reason: HOSPADM

## 2021-07-30 RX ORDER — FENTANYL CITRATE 50 UG/ML
50 INJECTION, SOLUTION INTRAMUSCULAR; INTRAVENOUS EVERY 5 MIN PRN
Status: DISCONTINUED | OUTPATIENT
Start: 2021-07-30 | End: 2021-07-30 | Stop reason: HOSPADM

## 2021-07-30 RX ORDER — SODIUM CHLORIDE FOR INHALATION 0.9 %
VIAL, NEBULIZER (ML) INHALATION
Status: DISCONTINUED
Start: 2021-07-30 | End: 2021-07-30 | Stop reason: HOSPADM

## 2021-07-30 RX ORDER — PROPOFOL 10 MG/ML
INJECTION, EMULSION INTRAVENOUS PRN
Status: DISCONTINUED | OUTPATIENT
Start: 2021-07-30 | End: 2021-07-30 | Stop reason: SDUPTHER

## 2021-07-30 RX ORDER — CEFAZOLIN SODIUM 2 G/100ML
2000 INJECTION, SOLUTION INTRAVENOUS
Status: COMPLETED | OUTPATIENT
Start: 2021-07-30 | End: 2021-07-30

## 2021-07-30 RX ORDER — MEPERIDINE HYDROCHLORIDE 25 MG/ML
12.5 INJECTION INTRAMUSCULAR; INTRAVENOUS; SUBCUTANEOUS EVERY 5 MIN PRN
Status: DISCONTINUED | OUTPATIENT
Start: 2021-07-30 | End: 2021-07-30 | Stop reason: HOSPADM

## 2021-07-30 RX ORDER — LABETALOL HYDROCHLORIDE 5 MG/ML
5 INJECTION, SOLUTION INTRAVENOUS EVERY 10 MIN PRN
Status: DISCONTINUED | OUTPATIENT
Start: 2021-07-30 | End: 2021-07-30 | Stop reason: HOSPADM

## 2021-07-30 RX ORDER — DIPHENHYDRAMINE HYDROCHLORIDE 50 MG/ML
12.5 INJECTION INTRAMUSCULAR; INTRAVENOUS
Status: DISCONTINUED | OUTPATIENT
Start: 2021-07-30 | End: 2021-07-30 | Stop reason: HOSPADM

## 2021-07-30 RX ORDER — MIDAZOLAM HYDROCHLORIDE 1 MG/ML
INJECTION INTRAMUSCULAR; INTRAVENOUS PRN
Status: DISCONTINUED | OUTPATIENT
Start: 2021-07-30 | End: 2021-07-30 | Stop reason: SDUPTHER

## 2021-07-30 RX ORDER — LIDOCAINE HYDROCHLORIDE 20 MG/ML
INJECTION, SOLUTION INTRAVENOUS PRN
Status: DISCONTINUED | OUTPATIENT
Start: 2021-07-30 | End: 2021-07-30 | Stop reason: SDUPTHER

## 2021-07-30 RX ORDER — ONDANSETRON 2 MG/ML
INJECTION INTRAMUSCULAR; INTRAVENOUS PRN
Status: DISCONTINUED | OUTPATIENT
Start: 2021-07-30 | End: 2021-07-30 | Stop reason: SDUPTHER

## 2021-07-30 RX ORDER — HYDROMORPHONE HCL 110MG/55ML
0.5 PATIENT CONTROLLED ANALGESIA SYRINGE INTRAVENOUS EVERY 5 MIN PRN
Status: DISCONTINUED | OUTPATIENT
Start: 2021-07-30 | End: 2021-07-30 | Stop reason: HOSPADM

## 2021-07-30 RX ORDER — FENTANYL CITRATE 50 UG/ML
INJECTION, SOLUTION INTRAMUSCULAR; INTRAVENOUS PRN
Status: DISCONTINUED | OUTPATIENT
Start: 2021-07-30 | End: 2021-07-30 | Stop reason: SDUPTHER

## 2021-07-30 RX ORDER — HYDROCODONE BITARTRATE AND ACETAMINOPHEN 5; 325 MG/1; MG/1
2 TABLET ORAL EVERY 6 HOURS PRN
Status: COMPLETED | OUTPATIENT
Start: 2021-07-30 | End: 2021-07-30

## 2021-07-30 RX ORDER — HYDROCODONE BITARTRATE AND ACETAMINOPHEN 5; 325 MG/1; MG/1
1 TABLET ORAL PRN
Status: COMPLETED | OUTPATIENT
Start: 2021-07-30 | End: 2021-07-30

## 2021-07-30 RX ORDER — PROMETHAZINE HYDROCHLORIDE 25 MG/ML
6.25 INJECTION, SOLUTION INTRAMUSCULAR; INTRAVENOUS
Status: DISCONTINUED | OUTPATIENT
Start: 2021-07-30 | End: 2021-07-30 | Stop reason: HOSPADM

## 2021-07-30 RX ADMIN — FENTANYL CITRATE 25 MCG: 50 INJECTION, SOLUTION INTRAMUSCULAR; INTRAVENOUS at 08:11

## 2021-07-30 RX ADMIN — FENTANYL CITRATE 50 MCG: 50 INJECTION, SOLUTION INTRAMUSCULAR; INTRAVENOUS at 07:42

## 2021-07-30 RX ADMIN — FENTANYL CITRATE 25 MCG: 50 INJECTION, SOLUTION INTRAMUSCULAR; INTRAVENOUS at 08:41

## 2021-07-30 RX ADMIN — MIDAZOLAM 2 MG: 1 INJECTION INTRAMUSCULAR; INTRAVENOUS at 07:27

## 2021-07-30 RX ADMIN — DEXAMETHASONE SODIUM PHOSPHATE 8 MG: 4 INJECTION, SOLUTION INTRAMUSCULAR; INTRAVENOUS at 07:45

## 2021-07-30 RX ADMIN — LIDOCAINE HYDROCHLORIDE 100 MG: 20 INJECTION, SOLUTION INTRAVENOUS at 07:35

## 2021-07-30 RX ADMIN — FENTANYL CITRATE 50 MCG: 50 INJECTION, SOLUTION INTRAMUSCULAR; INTRAVENOUS at 07:57

## 2021-07-30 RX ADMIN — HYDROCODONE BITARTRATE AND ACETAMINOPHEN 1 TABLET: 5; 325 TABLET ORAL at 10:12

## 2021-07-30 RX ADMIN — FENTANYL CITRATE 50 MCG: 50 INJECTION, SOLUTION INTRAMUSCULAR; INTRAVENOUS at 07:33

## 2021-07-30 RX ADMIN — FENTANYL CITRATE 50 MCG: 50 INJECTION, SOLUTION INTRAMUSCULAR; INTRAVENOUS at 07:53

## 2021-07-30 RX ADMIN — CEFAZOLIN SODIUM 2000 MG: 2 INJECTION, SOLUTION INTRAVENOUS at 07:46

## 2021-07-30 RX ADMIN — PROPOFOL 200 MG: 10 INJECTION, EMULSION INTRAVENOUS at 07:35

## 2021-07-30 RX ADMIN — SODIUM CHLORIDE, POTASSIUM CHLORIDE, SODIUM LACTATE AND CALCIUM CHLORIDE: 600; 310; 30; 20 INJECTION, SOLUTION INTRAVENOUS at 07:24

## 2021-07-30 RX ADMIN — ONDANSETRON 4 MG: 2 INJECTION INTRAMUSCULAR; INTRAVENOUS at 08:33

## 2021-07-30 ASSESSMENT — PULMONARY FUNCTION TESTS
PIF_VALUE: 18
PIF_VALUE: 2
PIF_VALUE: 3
PIF_VALUE: 18
PIF_VALUE: 0
PIF_VALUE: 18
PIF_VALUE: 17
PIF_VALUE: 18
PIF_VALUE: 17
PIF_VALUE: 18
PIF_VALUE: 17
PIF_VALUE: 18
PIF_VALUE: 16
PIF_VALUE: 17
PIF_VALUE: 4
PIF_VALUE: 17
PIF_VALUE: 12
PIF_VALUE: 18
PIF_VALUE: 0
PIF_VALUE: 0
PIF_VALUE: 17
PIF_VALUE: 18
PIF_VALUE: 17
PIF_VALUE: 18
PIF_VALUE: 17
PIF_VALUE: 18
PIF_VALUE: 17
PIF_VALUE: 18
PIF_VALUE: 18
PIF_VALUE: 17
PIF_VALUE: 2
PIF_VALUE: 18
PIF_VALUE: 18
PIF_VALUE: 11
PIF_VALUE: 17
PIF_VALUE: 18
PIF_VALUE: 17
PIF_VALUE: 11
PIF_VALUE: 18
PIF_VALUE: 17
PIF_VALUE: 18
PIF_VALUE: 11
PIF_VALUE: 17
PIF_VALUE: 18
PIF_VALUE: 1
PIF_VALUE: 4
PIF_VALUE: 17
PIF_VALUE: 18
PIF_VALUE: 17
PIF_VALUE: 18
PIF_VALUE: 1
PIF_VALUE: 17
PIF_VALUE: 2
PIF_VALUE: 18
PIF_VALUE: 12
PIF_VALUE: 18
PIF_VALUE: 17
PIF_VALUE: 18
PIF_VALUE: 3
PIF_VALUE: 1
PIF_VALUE: 18
PIF_VALUE: 12
PIF_VALUE: 18
PIF_VALUE: 0
PIF_VALUE: 3
PIF_VALUE: 17
PIF_VALUE: 17
PIF_VALUE: 2
PIF_VALUE: 17
PIF_VALUE: 18
PIF_VALUE: 2
PIF_VALUE: 0
PIF_VALUE: 18
PIF_VALUE: 15
PIF_VALUE: 18
PIF_VALUE: 17
PIF_VALUE: 17

## 2021-07-30 ASSESSMENT — PAIN SCALES - GENERAL
PAINLEVEL_OUTOF10: 6
PAINLEVEL_OUTOF10: 2
PAINLEVEL_OUTOF10: 0
PAINLEVEL_OUTOF10: 0
PAINLEVEL_OUTOF10: 3

## 2021-07-30 ASSESSMENT — PAIN DESCRIPTION - ORIENTATION: ORIENTATION: LEFT

## 2021-07-30 ASSESSMENT — PAIN DESCRIPTION - PAIN TYPE
TYPE: SURGICAL PAIN
TYPE: SURGICAL PAIN

## 2021-07-30 ASSESSMENT — PAIN DESCRIPTION - LOCATION: LOCATION: ANKLE

## 2021-07-30 ASSESSMENT — PAIN DESCRIPTION - DESCRIPTORS: DESCRIPTORS: DISCOMFORT

## 2021-07-30 ASSESSMENT — PAIN DESCRIPTION - ONSET: ONSET: GRADUAL

## 2021-07-30 ASSESSMENT — PAIN DESCRIPTION - FREQUENCY: FREQUENCY: INTERMITTENT

## 2021-07-30 NOTE — TELEPHONE ENCOUNTER
I called [facility] at [phone] and spoke with Harish Starks.  I was informed that CPT [id number] was approved 7/27/21-10/27/21 Ref # [id number]

## 2021-07-30 NOTE — OP NOTE
Operative Note      Patient: Girish De Paz  YOB: 1970  MRN: [id number]    Date of Procedure: 7/30/2021    Pre-Op Diagnosis: Left ankle bimalleolar fracture    Post-Op Diagnosis: Same       Procedure(s):  LEFT ANKLE OPEN REDUCTION INTERNAL FIXATION bimalleolar fracture    Surgeon(s):  Stanislaw Contreras MD    Assistant:   * No surgical staff found *    Anesthesia: General    Estimated Blood Loss (mL): less than 50     Complications: None    Specimens:   * No specimens in log *    Implants:  Implant Name Type Inv. Item Serial No.  Lot No. LRB No. Used Action   PLATE BNE [id number] H ST L LAT DST FIBULAR S STL NITISH COMPR  PLATE BNE [id number] 4 H ST L LAT DST FIBULAR S STL NITISH COMPR  DEPUY [employer]THES [place]-WD [id number] Left 1 Implanted   SCREW BNE L22MM DIA3.5MM ART S STL ST NONCANNULATED NITISH  SCREW BNE L22MM DIA3.5MM ART S STL ST NONCANNULATED NITISH  DEPUY [employer]THES [place]-WD  Left 1 Implanted   SCREW BNE L14MM DIA3.5MM ART S STL ST NONCANNULATED NITISH  SCREW BNE L14MM DIA3.5MM ART S STL ST NONCANNULATED NITISH  DEPUY [employer]THES [place]-WD  Left 1 Implanted   SCREW BNE L16MM DIA3.5MM ART S STL ST NONCANNULATED NITISH  SCREW BNE L16MM DIA3.5MM ART S STL ST NONCANNULATED NITISH  DEPUY SYNTHES [place]-WD  Left 2 Implanted   SCREW BNE L14MM DIA2.7MM ART S STL ST NITISH FULL THRD T8  SCREW BNE L14MM DIA2.7MM ART S STL ST NITISH FULL THRD T8  DEPUY [place]-WD  Left 1 Implanted   SCREW BNE L16MM DIA2.7MM ART S STL ST NITISH FULL THRD T8  SCREW BNE L16MM DIA2.7MM ART S STL ST NITISH FULL THRD T8  DEPUY [place] [employer]-WD  Left 2 Implanted   SCREW BNE L10MM DIA2.7MM ART S STL ST NITISH FULL THRD T8  SCREW BNE L10MM DIA2.7MM ART S STL ST NITISH FULL THRD T8  DEPUY [employer] [place] [employer]-WD  Left 1 Implanted   SCREW BNE L16MM DIA3.5MM ART S STL ST NITISH FULL THRD  SCREW BNE L16MM DIA3.5MM ART S STL ST NITISH FULL THRD  [employer] [place]-WD  Left 2 Implanted         Drains: * No LDAs found *    Findings:     Detailed Description of Procedure:    The patient was identified in the preoperative area and the site was marked. He was taken back to the operating room placed supine on the table. After induction of general endotracheal anesthesia, the left lower extremity was prepped and draped in sterile fashion with a thigh tourniquet. Timeout was performed and preoperative antibiotics were given. The leg was exsanguinated and tourniquet was then inflated to 300 mmHg and deflated at the conclusion of the case after less than 1 hour of tourniquet time. X-rays were taken which revealed a displaced ankle fracture with lateral translation of the talus relative to the tibial plafond and small avulsion fracture fragment at the tip of the medial malleolus    A lateral lateral incision was made over the ankle overlying the distal fibula dissection was carried down to subcu tissues and bleeding was controlled with Bovie. Care was taken to protect protect branches of the superficial peroneal nerve. The fracture Site was encountered and hematoma and soft tissue was cleared from the fracture site. There was comminution present anteriorly at the distal extent tension of the fracture site. The ankle was reduced anatomically with multiple reduction clamps and confirmed with the C arm. A interfragmentary lag screw was placed across the main fracture site with a 22mm cortical 3.5 millimeter screw. Next a 4 hole Synthes distal fibular locking plate was contoured to fit the lateral border of the fibula and set in position along its height and rotation with a cortical screw in the proximal shaft segment. Next 5 locking screws were placed in the distal fragment and additional cortical and locking screws were placed in the shaft completing fixation across the fracture site.  The comminution anteriorly at the fracture site was in stable position and well aligned and required no further fixation x-rays were taken with stress examination under live fluoroscopy which revealed no widening of the syndesmosis. Excellent stability across the fracture site. The small medial malleolar fracture fragment was in good position and no further fixation was necessary immediately. The wound was thoroughly irrigated and the deep layers were closed over the plate with 2-0 Vicryl suture in subcutaneous tissues were closed with buried 2-0 Vicryl and skin was closed with interrupted 3-0 nylon. Sterile dressing was applied with Xeroform and 4 x 8's and ABD. The tourniquet was deflated during wound closure and bleeding was well controlled. Sterile web roll and a well-padded posterior sugar tong splint was applied with an Ace wrap. Patient was extubated taken to PACU in stable condition. All sponge and needle counts were correct. Postoperative plan:  Patient be nonweightbearing in a splint and follow-up in my office in 10 to 14 days for suture removal and x-rays. We will transition him to a boot with early partial weightbearing at that time and range of motion exercises.     Electronically signed by Wilma Mendoza MD on 7/30/2021 at 9:08 AM

## 2021-07-30 NOTE — PROGRESS NOTES
4799 Patient arrived to PACU from OR. Monitors applied and alarms on. No drainage from site. Ice applied and left ankle. Report from [facility].   3011 Patient turned side to side in bed.   5101 Patient transferred out of same day surgery. Report to [facility].

## 2021-07-30 NOTE — H&P
Chief Complaint   Patient presents with    Ankle Injury       left ankle distal fibula fx, dislocation DOI 7/24/21         History of Present Illness:                             Debra Richard is a 54 y.o. male who presents today for evaluation of his left ankle pain and swelling. He had an injury on 7/24/2021 when he was walking down the stairs and twisted awkwardly onto his left ankle. He had immediate severe pain and swelling with inability to bear weight. He was seen in emergency room and x-rays were taken which revealed a unstable fracture of his ankle. He had a closed reduction maneuver performed and application of a splint. Is here today for further evaluation.     No history of previous injuries to the ankle. He ambulated well prior to the fall. Pain is diffuse throughout the ankle but most significant on the lateral aspect. He reports no other injuries or areas of pain.     Patient presents to the office today for ED FU of the left ankle fx and dislocation DOI 7/24/21. Pt states he fell down the stairs and twisted his left ankle. Pt states he instantly had pain in the left ankle. Pain today is a 3/10 he has been nonweightbearing on the left ankle. Pt states pain is along the medial aspect of the left ankle.            Medical History  Patient's medications, allergies, past medical, surgical, social and family histories were reviewed and updated as appropriate.     Past Medical History   No past medical history on file. Past Surgical History   No past surgical history on file. Family History   No family history on file.      Social History   Social History            Socioeconomic History    Marital status:        Spouse name: Not on file    Number of children: Not on file    Years of education: Not on file    Highest education level: Not on file   Occupational History    Not on file   Tobacco Use    Smoking status: Never Smoker    Smokeless tobacco: Never Used   Vaping Use    Vaping Use: Never used   Substance and Sexual Activity    Alcohol use: Not on file    Drug use: Never    Sexual activity: Not on file   Other Topics Concern    Not on file   Social History Narrative    Not on file      Social Determinants of Health      Financial Resource Strain:     Difficulty of Paying Living Expenses:    Food Insecurity:     Worried About Running Out of Food in the Last Year:     [address] in the Last Year:    Transportation Needs:     Lack of Transportation (Medical):  Lack of Transportation (Non-Medical):    Physical Activity:     Days of Exercise per Week:     Minutes of Exercise per Session:    Stress:     Feeling of Stress :    Social Connections:     Frequency of Communication with Friends and Family:     Frequency of Social Gatherings with Friends and Family:     Attends Judaism Services:     Active Member of Clubs or Organizations:     Attends Club or Organization Meetings:     Marital Status:    Intimate Partner Violence:     Fear of Current or Ex-Partner:     Emotionally Abused:     Physically Abused:     Sexually Abused:          Current Facility-Administered Medications          Current Outpatient Medications   Medication Sig Dispense Refill    acetaminophen-codeine (TYLENOL/CODEINE #3) 300-30 MG per tablet Take 1 tablet by mouth every 4 hours as needed for Pain for up to 4 days. Intended supply: 3 days. Take lowest dose possible to manage pain 15 tablet 0      No current facility-administered medications for this visit.         No Known Allergies        Review of Systems   Constitutional: Negative for chills and fever. HENT: Negative for congestion and sneezing. Eyes: Negative for pain and redness. Respiratory: Negative for chest tightness, shortness of breath and wheezing. Cardiovascular: Negative for chest pain and palpitations. Gastrointestinal: Negative for vomiting. Musculoskeletal: Positive for arthralgias.    Skin: Negative for color change and rash. Neurological: Negative for weakness and numbness. Psychiatric/Behavioral: Negative for agitation. The patient is not nervous/anxious.                                                  Examination:  General Exam:  Vitals: Pulse 100   Resp 16   Ht 6' (1.829 m)   Wt 235 lb (106.6 kg)   SpO2 98%   BMI 31.87 kg/m²    Physical Exam  Vitals and nursing note reviewed. Constitutional:       Appearance: Normal appearance. HENT:      Head: Normocephalic and atraumatic. Eyes:      Conjunctiva/sclera: Conjunctivae normal.      Pupils: Pupils are equal, round, and reactive to light. Pulmonary:      Effort: Pulmonary effort is normal.   Musculoskeletal:      Cervical back: Normal range of motion. Left knee: No swelling, effusion or ecchymosis. Normal range of motion. No tenderness. Right ankle: No swelling, deformity, ecchymosis or lacerations. No tenderness. Normal range of motion. Normal pulse. Right Achilles Tendon: Normal.      Comments: Left lower extremity:  There is tenderness to palpation diffusely throughout the ankle medially and laterally but severe maximal tenderness overlying the lateral malleolus. There is moderate diffuse soft tissue swelling present medially and laterally at the ankle. There is ecchymosis present at the ankle both medially and laterally. No tenderness to palpation at the proximal leg. Mild tenderness over the anterior syndesmosis. Range of motion deferred due to known fracture. Brisk cap refill and 2+ DP pulse present. Sensation is intact to light touch but decreased due to swelling throughout the foot. There is mild deformity of the ankle from the displacement of the fracture. Skin is intact with no open wounds or lesions. No tenderness to palpation at the knee. Skin:     General: Skin is warm and dry. Neurological:      Mental Status: He is alert and oriented to person, place, and time.    Psychiatric:         Mood and Affect: Mood normal.         Behavior: Behavior normal.               Diagnostic testing:  X-ray images were reviewed by myself and discussed with the patient:  Her images before and after reduction from 7/24/2021 in the ER reviewed by myself discussed with the patient:  Impression   Acute traumatic displaced and mildly comminuted lateral malleolar fracture at   the level of the distal syndesmosis/tibiotalar joint.  Associated disruption   of the ankle mortise with widening of the medial clear space compatible with   ligamentous injury.  There also appear to be some small likely acute avulsion   fracture fragments in the medial clear space adjacent to the medial talus.       Associated diffuse soft tissue swelling/subcutaneous edema about the distal   leg and ankle.  No radiopaque foreign bodies or soft tissue gas.                Office Procedures:  No orders of the defined types were placed in this encounter.        Assessment and Plan  1. Left ankle displaced comminuted distal fibula fracture, bimalleolar equivalent     I reviewed the x-ray findings in detail with the patient. I have explained that the fracture pattern and displacement may lead to complications with nonoperative treatment. Specifically I am concerned about malalignment and posttraumatic arthritis of the ankle joint.       Discussed possibility for syndesmotic disruption and possible need to stabilize with syndesmotic screws. Discussed potential need for hardware removal in the future. We discussed the potential for medial deltoid ligament repair if there is instability at the medial aspect of the ankle following fixation of the fibula fracture.     Therefore I have recommended open reduction and internal fixation to restore the anatomy of the ankle bones and articular surfaces. We have discussed the risks, benefits, and alternatives to surgery. We discussed the goals of surgery and anticipated recovery process.   We discussed specific pertinent risks including delayed fracture healing, nonunion, refracture, posttraumatic arthritis, infection, and DVT.     The patient understands and wishes to proceed with surgical intervention. History and Physical exam note from the office visit is copied above from epic. I have reviewed the note and examined the patient and find no relevant changes.      I have reviewed with the patient and/or family the risks, benefits, and alternatives to the procedure as well as expected postoperative course    Azalia Blanca MD, MD

## 2021-07-30 NOTE — ANESTHESIA POSTPROCEDURE EVALUATION
Department of Anesthesiology  Postprocedure Note    Patient: Zoran Covert  MRN: [id number]  YOB: 1970  Date of evaluation: 7/30/2021  Time:  9:17 AM     Procedure Summary     Date: 07/30/21 Room / Location: [address] [place] [address]    Anesthesia Start: 3588 Anesthesia Stop: 5818    Procedure: LEFT ANKLE OPEN REDUCTION INTERNAL FIXATION (Left Ankle) Diagnosis:       Other closed fracture of distal end of left fibula, initial encounter      (Other closed fracture of distal end of left fibula, initial encounter [U82.008B])    Surgeons: Azalia Blanca MD Responsible Provider: Elder Storey MD    Anesthesia Type: general ASA Status: 2          Anesthesia Type: general    Natalie Phase I: Natalie Score: 8    Natalie Phase II:      Last vitals: Reviewed and per EMR flowsheets.        Anesthesia Post Evaluation    Patient location during evaluation: PACU  Patient participation: complete - patient participated  Level of consciousness: awake and alert  Pain score: 3  Airway patency: patent  Nausea & Vomiting: no vomiting and no nausea  Complications: no  Cardiovascular status: blood pressure returned to baseline and hemodynamically stable  Respiratory status: acceptable, room air, spontaneous ventilation and nonlabored ventilation  Hydration status: stable

## 2021-08-13 ENCOUNTER — OFFICE VISIT (OUTPATIENT)
Dept: ORTHOPEDIC SURGERY | Age: 51
End: 2021-08-13

## 2021-08-13 VITALS — BODY MASS INDEX: 31.15 KG/M2 | RESPIRATION RATE: 16 BRPM | WEIGHT: 230 LBS | HEIGHT: 72 IN

## 2021-08-13 DIAGNOSIS — Z87.81 S/P ORIF (OPEN REDUCTION INTERNAL FIXATION) FRACTURE: Primary | ICD-10-CM

## 2021-08-13 DIAGNOSIS — Z98.890 S/P ORIF (OPEN REDUCTION INTERNAL FIXATION) FRACTURE: Primary | ICD-10-CM

## 2021-08-13 PROCEDURE — 99024 POSTOP FOLLOW-UP VISIT: CPT | Performed by: PHYSICIAN ASSISTANT

## 2021-08-13 NOTE — PATIENT INSTRUCTIONS
Cam boot given in office today  May come out of boot for hygiene and rest ( on couch or chair )  Continue nonweightbear  Ice and elevate as needed  Tylenol or Motrin for pain  May now get incision wet, but do not submerge the incision.   Follow up in 2-3 weeks with Darnell Bell

## 2021-08-13 NOTE — PROGRESS NOTES
Date of surgery:   July 30th   Surgeon: Dr. Tiffanie Dudley    History:  Mr. Kevlin Miranda is here in follow up regarding his left ankle lateral malleolus with bimalleolar fracture equivalent ORIF DOS 07/30/2021  He is doing rather well. He is having 1/10 pain. He denies chest pain, SOB, calf pain,fever,wound drainage. No other issues. Physical:  Vitals:    08/13/21 0912   Resp: 16   Weight: 230 lb (104.3 kg)   Height: 6' (1.829 m)       Incision over the left lateral ankle is intact with sutures in place. Mild edema, no erythema,  no ecchymosis. Imaging studies:  3 views of the left ankle taken reviewed in the office today show intact orthopedic hardware over the previously identified fracture of the lateral malleolus with the fracture in anatomic alignment and ankle mortise intact. No complications regarding the orthopedic hardware seen postoperatively. The official read and interpretation of these x-rays will be done by the the [facility]       Impression: Status post ORIF left lateral malleolus fracture (bimalleolar equivalent)    Plan:   Patient Instructions   Cam boot given in office today  May come out of boot for hygiene and rest ( on couch or chair )  Continue nonweightbear  Ice and elevate as needed  Tylenol or Motrin for pain  May now get incision wet, but do not submerge the incision.   Follow up in 2-3 weeks with Adriana Wiley

## 2021-08-15 PROBLEM — Z87.81 S/P ORIF (OPEN REDUCTION INTERNAL FIXATION) FRACTURE: Status: ACTIVE | Noted: 2021-08-15

## 2021-08-15 PROBLEM — Z98.890 S/P ORIF (OPEN REDUCTION INTERNAL FIXATION) FRACTURE: Status: ACTIVE | Noted: 2021-08-15

## 2021-09-03 ENCOUNTER — OFFICE VISIT (OUTPATIENT)
Dept: ORTHOPEDIC SURGERY | Age: 51
End: 2021-09-03

## 2021-09-03 VITALS
HEIGHT: 72 IN | OXYGEN SATURATION: 92 % | HEART RATE: 100 BPM | RESPIRATION RATE: 16 BRPM | BODY MASS INDEX: 31.83 KG/M2 | WEIGHT: 235 LBS

## 2021-09-03 DIAGNOSIS — Z87.81 S/P ORIF (OPEN REDUCTION INTERNAL FIXATION) FRACTURE: Primary | ICD-10-CM

## 2021-09-03 DIAGNOSIS — Z98.890 S/P ORIF (OPEN REDUCTION INTERNAL FIXATION) FRACTURE: Primary | ICD-10-CM

## 2021-09-03 PROCEDURE — 99024 POSTOP FOLLOW-UP VISIT: CPT | Performed by: ORTHOPAEDIC SURGERY

## 2021-09-03 RX ORDER — CEPHALEXIN 500 MG/1
500 CAPSULE ORAL 3 TIMES DAILY
Qty: 30 CAPSULE | Refills: 0 | Status: SHIPPED | OUTPATIENT
Start: 2021-09-03 | End: 2022-01-07

## 2021-09-03 NOTE — PROGRESS NOTES
Patient presents to the office for a 4 week post op follow up on his left ankle ORIF that was performed 7/30/2021. Patient has clear drainage states it is all the time, denies any pain just tenderness, has occasional twinges along the incision site. Patient is taking ASA for pain which is helping. Patient denies any new or worsening symptoms. Patient is progressing well.

## 2021-09-03 NOTE — PATIENT INSTRUCTIONS
Take antibiotic as prescribed  May use Peroxide to clean the incision site  Remain weightbearing as tolerated in cam boot  Work on ROM and stretching exercises of the ankle   Rest, ice, and elevate as needed  Follow up in 2 weeks

## 2021-09-04 NOTE — PROGRESS NOTES
9/3/2021   Chief Complaint   Patient presents with    Post-Op Check     //L Ankle ORIF dos 7/30/2021        History of Present Illness:                             Raymond Cordova is a 54 y.o. male who returns today for follow-up of his left ankle ORIF. He has been doing well with walking in his boot but has continued to deal with some swelling and has developed some mild redness and mild clear drainage at the healing surgical site of the lateral aspect of the ankle. He denies any fevers or chills. He has had no purulence or severe pockets of swelling or fluid at the ankle. Patient presents to the office for a 4 week post op follow up on his left ankle ORIF that was performed 7/30/2021. Patient has clear drainage states it is all the time, denies any pain just tenderness, has occasional twinges along the incision site. Patient is taking ASA for pain which is helping. Patient denies any new or worsening symptoms. Patient is progressing well. Medical History  Patient's medications, allergies, past medical, surgical, social and family histories were reviewed and updated as appropriate. Review of Systems                                            Examination:  General Exam:  Vitals: Pulse 100   Resp 16   Ht 6' (1.829 m)   Wt 235 lb (106.6 kg)   SpO2 92%   BMI 31.87 kg/m²    Physical Exam   Left lower extremity:  There is moderate swelling throughout the lower leg and ankle with chronic venous changes and varicosities present in the soft tissues. There is mild surrounding erythema present at the healing surgical site of the lateral ankle overlying the malleolus. No fluctuance or induration or purulence. No active drainage. The surgical site was cleaned with peroxide and mild debris and scab tissue was cleared from the healing surgical wound. There was no exposed bone or hardware or deep tracking of the incision. Normal alignment of the ankle joint.   Good active range of motion with ankle dorsiflexion and plantarflexion. Sensation and motor function is intact throughout the foot and toes. Diagnostic testing:  X-rays reviewed in office, I independently reviewed the films in the office today:   XR ANKLE LEFT (MIN 3 VIEWS)    Result Date: 9/3/2021  3 views of the left ankle show excellent position and alignment of the lateral malleolus status post open reduction and internal fixation with normal alignment the ankle mortise and syndesmosis. Good healing at the fracture site. Office Procedures:  No orders of the defined types were placed in this encounter. Assessment and Plan  1. Left ankle open reduction and internal fixation    2. Left ankle mild cellulitis at the healing surgical site    I explained the patient that I am concerned about some mild superficial infection at the healing surgical site because of his ongoing mild drainage and surrounding erythema. Currently his situation appears mild I do not expected any formal debridements or surgical invention will be necessary. I have prescribed Keflex for 10 days and instructed him on local wound care with dressing changes and cleaning the area with saline and peroxide. Continue protected weightbearing in the boot. Follow-up in 2 weeks for wound check.   We discussed signs symptoms of a worsening infection that would require more urgent intervention he will call or follow-up sooner if needed        Electronically signed by Jordan Onofre MD on 9/4/2021 at 9:17 AM

## 2021-09-16 ENCOUNTER — OFFICE VISIT (OUTPATIENT)
Dept: ORTHOPEDIC SURGERY | Age: 51
End: 2021-09-16

## 2021-09-16 VITALS
OXYGEN SATURATION: 100 % | RESPIRATION RATE: 16 BRPM | WEIGHT: 235 LBS | BODY MASS INDEX: 31.83 KG/M2 | HEIGHT: 72 IN | HEART RATE: 81 BPM

## 2021-09-16 DIAGNOSIS — Z87.81 S/P ORIF (OPEN REDUCTION INTERNAL FIXATION) FRACTURE: Primary | ICD-10-CM

## 2021-09-16 DIAGNOSIS — Z98.890 S/P ORIF (OPEN REDUCTION INTERNAL FIXATION) FRACTURE: Primary | ICD-10-CM

## 2021-09-16 PROCEDURE — 99024 POSTOP FOLLOW-UP VISIT: CPT | Performed by: ORTHOPAEDIC SURGERY

## 2021-09-16 RX ORDER — CEPHALEXIN 500 MG/1
500 CAPSULE ORAL 3 TIMES DAILY
Qty: 21 CAPSULE | Refills: 0 | Status: SHIPPED | OUTPATIENT
Start: 2021-09-16 | End: 2022-01-07

## 2021-09-16 NOTE — PROGRESS NOTES
Patient returns to the office for a 2 week post operative wound check on his left ankle ORIF that was performed on 7/3/21.

## 2021-09-16 NOTE — PROGRESS NOTES
Patient presents to the office today for FU of the left ORIF DOS 7/3/21. Pt states pain today pain is a 4/10 will increase with prolong walking or standing. He has been changing his dressing daily.

## 2021-09-16 NOTE — PROGRESS NOTES
9/16/2021   Chief Complaint   Patient presents with    Wound Check     s/p left ankle ORIF DOS 7/3/21        History of Present Illness:                             Raymond Cordova is a 54 y.o. male who returns today for a wound check of the left ankle. He has done well with his antibiotics and local wound care. He feels that his ankle is improving. He has been walking and bearing weight in his boot. He denies any fevers or chills. He has had some mild clear drainage on his dressing changes from the healing of surgical site but no significant purulence. Patient presents to the office today for FU of the left ORIF DOS 7/3/21. Pt states pain today pain is a 4/10 will increase with prolong walking or standing. He has been changing his dressing daily. Medical History  Patient's medications, allergies, past medical, surgical, social and family histories were reviewed and updated as appropriate.     Past Medical History:   Diagnosis Date    Fracture     in ER 7/24/2021 fx left ankle- for surg 7/30/2021    History of alcohol abuse     Pericarditis     \"in 2015\" no longer forllow with cardiologist    Sleep apnea     had sleep study 2014- uses cpap     Past Surgical History:   Procedure Laterality Date    ANKLE FRACTURE SURGERY Left 7/30/2021    LEFT ANKLE OPEN REDUCTION INTERNAL FIXATION performed by Laqueta Epley, MD at [facility] [place]     Family History   Problem Relation Age of Onset    Breast Cancer Mother     Cancer Father         lung cancer     Social History     Socioeconomic History    Marital status:      Spouse name: None    Number of children: None    Years of education: None    Highest education level: None   Occupational History    None   Tobacco Use    Smoking status: Current Every Day Smoker     Packs/day: 1.50     Years: 25.00     Pack years: 37.50     Types: Cigarettes    Smokeless tobacco: Never Used   Vaping Use    Vaping Use: Never used   Substance and Sexual Activity healing surgical site with some areas of granulation tissue along the healing scar. There is evidence of chronic vascular insufficiency present in both lower extremities. Minimal but improved tenderness to palpation overlying the distal fibula. No tenderness to palpation or swelling at the anterior medial ankle joint line. Excellent stability and alignment of the ankle. Good active range of motion of the ankle and toes. Office Procedures:  No orders of the defined types were placed in this encounter. Assessment and Plan  1. Left ankle ORIF    2. Left ankle superficial infection of healing surgical scar    He has responded well to local wound care and antibiotic therapy. He feels that his ankle is healing well and I recommended that we continue with conservative treatments at this time. He will continue with his antibiotics and I have given him another week's worth of Keflex. Continue local wound care. Follow-up in 2 weeks for check of his healing process and to evaluate his soft tissues after he has completed antibiotic therapy. We will repeat x-rays of the next visit. We discussed worsening signs and symptoms of infection which may necessitate irrigation and debridement. He will call if there are any issues or concerns.     Electronically signed by Yovanny Jose MD on 9/16/2021 at 9:23 AM

## 2021-09-30 ENCOUNTER — OFFICE VISIT (OUTPATIENT)
Dept: ORTHOPEDIC SURGERY | Age: 51
End: 2021-09-30

## 2021-09-30 VITALS
BODY MASS INDEX: 31.83 KG/M2 | WEIGHT: 235 LBS | HEIGHT: 72 IN | RESPIRATION RATE: 15 BRPM | OXYGEN SATURATION: 97 % | HEART RATE: 76 BPM

## 2021-09-30 DIAGNOSIS — Z98.890 S/P ORIF (OPEN REDUCTION INTERNAL FIXATION) FRACTURE: Primary | ICD-10-CM

## 2021-09-30 DIAGNOSIS — Z87.81 S/P ORIF (OPEN REDUCTION INTERNAL FIXATION) FRACTURE: Primary | ICD-10-CM

## 2021-09-30 PROCEDURE — 99024 POSTOP FOLLOW-UP VISIT: CPT | Performed by: ORTHOPAEDIC SURGERY

## 2021-09-30 NOTE — PROGRESS NOTES
Patient returns to the office today for left ankle ORIF DOS 7/3/21. Pt states that pain today Is a 2/10 pt states he is slowly getting better he has completed his antibiotic. Pt states that he started to use neosporin on the left ankle which seems to help.

## 2021-09-30 NOTE — PATIENT INSTRUCTIONS
Continue weight-bearing as tolerated. Continue range of motion exercises as instructed. Ice and elevate as needed. Tylenol or Motrin for pain.   Follow up in two weeks  continue to do your daily dressing changes

## 2021-10-03 NOTE — PROGRESS NOTES
9/30/2021   Chief Complaint   Patient presents with    Post-Op Check     left ankle ORIF DOS 7/3/21        History of Present Illness:                             Gisselle Varghese is a 54 y.o. male returns today for follow-up of his left ankle. He has noticed improvement of the healing process of his lateral ankle surgical site. He denies any purulent drainage or worsening redness or swelling. Has been doing local wound care which has been coming along well. Patient returns to the office today for left ankle ORIF DOS 7/3/21. Pt states that pain today Is a 2/10 pt states he is slowly getting better he has completed his antibiotic. Pt states that he started to use neosporin on the left ankle which seems to help. Medical History  Patient's medications, allergies, past medical, surgical, social and family histories were reviewed and updated as appropriate. Review of Systems                                            Examination:  General Exam:  Vitals: Pulse 76   Resp 15   Ht 6' (1.829 m)   Wt 235 lb (106.6 kg)   SpO2 97%   BMI 31.87 kg/m²    Physical Exam     Left lower extremity:  There is healing along the lateral ankle surgical site with small areas of scab formation and granulation tissue along the healing surgical incision site. Mild reactive erythema present adjacent to the surgical site with no induration or purulence or fluctuance. There are chronic venous insufficiency changes throughout the lower leg. No tenderness palpation of the ankle joints and no ankle joint effusion. Good active range of motion present the ankle with only mild limitations and no significant discomfort at the extremes of motion. Excellent alignment of the ankle and no instability with stress examination.     Diagnostic testing:  X-rays reviewed in office, I independently reviewed the films in the office today:   Narrative   3 views of the left ankle show excellent position and alignment status    post open reduction and internal fixation of the fibula stable end of the    hardware and progression of the healing process across the lateral    malleolus fracture.  Normal alignment ankle joint and mortise with no    clear space widening. Office Procedures:  No orders of the defined types were placed in this encounter. Assessment and Plan  1. Left ankle open reduction and internal fixation    Patient's surgical wound on the lateral aspect of his ankle is healing well with conservative measures. I performed a limited debridement today and encouraged him to continue doing daily dressing changes. He can discontinue his antibiotic therapies and follow-up here in 2-3 weeks for repeat x-rays and check of the healing process.         Electronically signed by Eb Motley MD on 10/3/2021 at 3:32 PM

## 2021-10-14 ENCOUNTER — TELEPHONE (OUTPATIENT)
Dept: ORTHOPEDIC SURGERY | Age: 51
End: 2021-10-14

## 2021-10-14 ENCOUNTER — OFFICE VISIT (OUTPATIENT)
Dept: ORTHOPEDIC SURGERY | Age: 51
End: 2021-10-14

## 2021-10-14 VITALS
OXYGEN SATURATION: 96 % | WEIGHT: 235 LBS | BODY MASS INDEX: 31.83 KG/M2 | RESPIRATION RATE: 15 BRPM | HEART RATE: 114 BPM | HEIGHT: 72 IN

## 2021-10-14 DIAGNOSIS — S82.842A BIMALLEOLAR FRACTURE OF LEFT ANKLE, CLOSED, INITIAL ENCOUNTER: Primary | ICD-10-CM

## 2021-10-14 PROCEDURE — 99024 POSTOP FOLLOW-UP VISIT: CPT | Performed by: ORTHOPAEDIC SURGERY

## 2021-10-14 NOTE — LETTER
[address] [place] [address] and Sports Medicine  [address]  Phone: [phone]  Fax: [phone]    Tapan Maldonado MD        October 14, 2021     Patient: Pascale Puentes   YOB: 1970   Date of Visit: 10/14/2021       To Whom It May Concern: It is my medical opinion that Pascale Puentes may return to work on 11/1/21 with the following restrictions: lifting/carrying not to exceed 10 lbs. , pushing/pulling not to exceed 10 lbs. , no ladders, no stairs, no ramp, avoid excessive walking or standing, light duty restricions until 11/18/21 . If you have any questions or concerns, please don't hesitate to call.     Sincerely,        Tapan Maldonado MD

## 2021-10-14 NOTE — PATIENT INSTRUCTIONS
Continue weight-bearing as tolerated. Continue range of motion exercises as instructed. Ice and elevate as needed. Tylenol or Motrin for pain.   Remain out of work for two weeks return to work on 11/1/21no lifting pushing or pulling more than 10lbs, no prolong walking or standing, no stair climbing   Follow up in 4 weeks

## 2021-10-14 NOTE — PROGRESS NOTES
Patient returns to the office today for left ankle ORIF DOS 7/3/21. Pt states pain in the left ankle today is a 1/10. Pt states that pain is along the lateral aspect. He will feel some increase pain in the left ankle when he is going down the stair.  Pt states he is still having some drainage coming from the left ankle

## 2021-10-14 NOTE — TELEPHONE ENCOUNTER
Pt to remain out of work until 11/1/21 patient return to work on 11/1/21 with light duty restrictions until 11/18/21

## 2021-10-15 ASSESSMENT — ENCOUNTER SYMPTOMS
SHORTNESS OF BREATH: 0
COLOR CHANGE: 0
CHEST TIGHTNESS: 0

## 2021-10-15 NOTE — PROGRESS NOTES
10/14/2021   Chief Complaint   Patient presents with    Ankle Pain     Left ankle ORIF DOS: 07/03/21        History of Present Illness:                             Santiago Smith is a 54 y.o. male who returns today for follow-up of his left ankle open reduction and internal fixation. He is doing well advancing his activities but continues to have some mild drainage and mild open areas of the healing surgical scar. He denies any fevers or chills. Denies any purulence or swelling or redness. He has been mostly leaving the incision open to air but occasionally does put some Neosporin antibiotic ointment on it. He has been off antibiotics and has not noticed any increased redness pain or drainage since discontinuing the antibiotic therapy. Patient returns to the office today for left ankle ORIF DOS 7/3/21. Pt states pain in the left ankle today is a 1/10. Pt states that pain is along the lateral aspect. He will feel some increase pain in the left ankle when he is going down the stair. Pt states he is still having some drainage coming from the left ankle    Medical History  Patient's medications, allergies, past medical, surgical, social and family histories were reviewed and updated as appropriate. Review of Systems   Constitutional: Negative for activity change and fever. Respiratory: Negative for chest tightness and shortness of breath. Cardiovascular: Negative for chest pain. Skin: Negative for color change. Neurological: Negative for dizziness. Psychiatric/Behavioral: The patient is not nervous/anxious.                                                 Examination:  General Exam:  Vitals: Pulse 114   Resp 15   Ht 6' (1.829 m)   Wt 235 lb (106.6 kg)   SpO2 96%   BMI 31.87 kg/m²    Physical Exam     Left lower extremity:  There is progression of the healing process at the lateral aspect of the ankle with a couple of small areas of granulation tissue along the healing surgical incision line.  There is no active drainage. No purulence. No induration. Mild erythematous changes are present throughout the lower extremity consistent with his chronic venous insufficiency. There is excellent alignment and stability across the ankle joint. Strength is 5 out of 5 with ankle flexion and extension. He is ambulating well today with no pain during weightbearing. Diagnostic testing:  X-rays reviewed in office, I independently reviewed the films in the office today:   XR ANKLE LEFT (MIN 3 VIEWS)    Result Date: 10/14/2021  3 views of the left ankle show excellent alignment of the ankle status post open reduction and internal fixation of lateral malleolus with stable alignment the hardware. No widening of the clear spaces or syndesmosis. No soft tissue swelling. Office Procedures:  No orders of the defined types were placed in this encounter. Assessment and Plan  1. Left ankle open reduction and internal fixation. He will continue with local wound care. We discussed signs symptoms of a worsening infection that would require antibiotics or potential surgical debridement. Currently it appears that his wound is healing well with conservative measures and we will keep a close eye on this moving forward. He will continue weightbearing as tolerated and build up strength and range of motion as a home exercise program.    I have written him a note to remain off of work for 2 weeks well building up his strength at the ankle and then return to work with no restrictions no lifting pushing or pulling more than 10 pounds and no prolonged walking or standing or stair climbing when he returns to work. Follow-up here in 4 weeks for repeat x-rays and a wound check. We will discuss advancing his activities at work at that time. He will call and return sooner if there are any concerns for any worsening wound issue.         Electronically signed by Emily Villa MD on 10/15/2021 at 10:21 AM

## 2021-11-18 ENCOUNTER — OFFICE VISIT (OUTPATIENT)
Dept: ORTHOPEDIC SURGERY | Age: 51
End: 2021-11-18
Payer: COMMERCIAL

## 2021-11-18 VITALS
OXYGEN SATURATION: 96 % | RESPIRATION RATE: 16 BRPM | HEART RATE: 71 BPM | HEIGHT: 72 IN | WEIGHT: 235 LBS | BODY MASS INDEX: 31.83 KG/M2

## 2021-11-18 DIAGNOSIS — Z87.81 S/P ORIF (OPEN REDUCTION INTERNAL FIXATION) FRACTURE: Primary | ICD-10-CM

## 2021-11-18 DIAGNOSIS — S82.842A BIMALLEOLAR FRACTURE OF LEFT ANKLE, CLOSED, INITIAL ENCOUNTER: ICD-10-CM

## 2021-11-18 DIAGNOSIS — Z98.890 S/P ORIF (OPEN REDUCTION INTERNAL FIXATION) FRACTURE: Primary | ICD-10-CM

## 2021-11-18 PROCEDURE — 99213 OFFICE O/P EST LOW 20 MIN: CPT | Performed by: ORTHOPAEDIC SURGERY

## 2021-11-18 NOTE — PATIENT INSTRUCTIONS
Continue weight-bearing as tolerated   Continue range of motion exercises as instructed. Ice and elevate as needed. Tylenol or Motrin for pain.   continue your current restrictions for one month   Follow up in one month

## 2021-11-18 NOTE — LETTER
[facility]  [address]  Phone: [phone]  Fax: [phone]    Yvonne Bernabe MD        November 18, 2021     Patient: Leatha Sharp   YOB: 1970   Date of Visit: 11/18/2021       To Whom It May Concern: It is my medical opinion that Leatha Sharp may return to light duty immediately with the following restrictions: lifting/carrying not to exceed 10 lbs. , pushing/pulling not to exceed 10 lbs. , no ladders, no stairs, no ramp, avoid excessive walking or standing for 4 weeks (until 12/17/21). If you have any questions or concerns, please don't hesitate to call.     Sincerely,        Yvonne Bernabe MD

## 2021-11-18 NOTE — LETTER
[facility]  [address]  Phone: [phone]  Fax: [phone]    Stoney Woodard MD        November 18, 2021     Patient: Purnima Ortiz   YOB: 1970   Date of Visit: 11/18/2021       To Whom It May Concern: It is my medical opinion that Purnima Ortiz may return to light duty immediately with the following restrictions: lifting/carrying not to exceed 10 lbs. , pushing/pulling not to exceed 10 lbs. , no ladders, no stairs, no ramp for 4 weeks. If you have any questions or concerns, please don't hesitate to call.     Sincerely,        Stoney Woodard MD

## 2021-11-18 NOTE — PROGRESS NOTES
11/18/2021   Chief Complaint   Patient presents with    Post-Op Check     left ankle ORIF 7/03/21        History of Present Illness:                             Eric Ramos is a 54 y.o. male who returns today for follow-up of his left ankle. He is continue to make good progress with his activities. He has no complaints of fevers or chills or drainage from the healing surgical site. He has been doing some wound care and using antibiotic ointment. He feels that the wound is continue to improve week by week. He is working well with his current restrictions at work. Pt, Eric Ramos is a 54 y.o. male returning to the office following a left ankle ORIF on 7/03/21. Pt states he is having very little pain rated at a level of 1/10. He also states he still has minor swelling. Pt states he still has limited ROM and and no problem with weightbearing activities. Pt states he has been doing the exorcises provided with moderate improvement. Medical History  Patient's medications, allergies, past medical, surgical, social and family histories were reviewed and updated as appropriate. Review of Systems                                            Examination:  General Exam:  Vitals: Pulse 71   Resp 16   Ht 6' (1.829 m)   Wt 235 lb (106.6 kg)   SpO2 96%   BMI 31.87 kg/m²    Physical Exam     Left lower extremity:  There is a healing surgical wound over the lateral aspect of the ankle with areas of superficial dehiscence of the surgical wound which appear improved from previous visit. There is scab tissue and fibrous debris present throughout portions of the surgical site. No fluctuance, purulence, or induration. Good active range of motion present at the ankle with mild restrictions especially during inversion eversion of the ankle. Excellent stability of the ankle. No pain with weightbearing. Strength is 5 out of 5 with dorsiflexion plantarflexion of the ankle.     Diagnostic testing:  X-rays reviewed in office, I independently reviewed the films in the office today:   XR ANKLE LEFT (MIN 3 VIEWS)    Result Date: 11/18/2021  3 views of the left ankle show excellent alignment status post open reduction and internal fixation of the lateral malleolus. Stable alignment of the hardware. Progression of the healing process across the fracture site. Normal alignment ankle mortise and syndesmosis with no clear space widening. Office Procedures:  No orders of the defined types were placed in this encounter. Assessment and Plan  1. Left ankle open reduction and internal fixation    A superficial limited debridement of the surgical wound was performed today. Scab tissue and fibrous debris was cleared from the incision line. There is no evidence of induration or fluctuance or purulence. There is healthy granulation tissue present through portions of the inferior aspect of the incision with areas of complete healing more superiorly. I recommended that we continue with local wound care. He continues to feel good and improved over time and I have recommended we continue current management. He will call if there are any concerns for fevers chills drainage or worsening symptoms at the wound. No antibiotics necessary at this time. Continue with current restrictions at work and follow-up in 1 month for repeat wound check. No x-rays are needed at that time if he is doing well.         Electronically signed by Sushila Ferraro MD on 11/18/2021 at 9:39 AM

## 2021-12-16 ENCOUNTER — OFFICE VISIT (OUTPATIENT)
Dept: ORTHOPEDIC SURGERY | Age: 51
End: 2021-12-16
Payer: COMMERCIAL

## 2021-12-16 VITALS
BODY MASS INDEX: 31.83 KG/M2 | HEART RATE: 89 BPM | WEIGHT: 235 LBS | RESPIRATION RATE: 16 BRPM | OXYGEN SATURATION: 96 % | HEIGHT: 72 IN

## 2021-12-16 DIAGNOSIS — Z98.890 S/P ORIF (OPEN REDUCTION INTERNAL FIXATION) FRACTURE: Primary | ICD-10-CM

## 2021-12-16 DIAGNOSIS — Z96.9 RETAINED ORTHOPEDIC HARDWARE: ICD-10-CM

## 2021-12-16 DIAGNOSIS — T81.49XA WOUND INFECTION AFTER SURGERY: ICD-10-CM

## 2021-12-16 DIAGNOSIS — Z87.81 S/P ORIF (OPEN REDUCTION INTERNAL FIXATION) FRACTURE: Primary | ICD-10-CM

## 2021-12-16 PROCEDURE — 99214 OFFICE O/P EST MOD 30 MIN: CPT | Performed by: ORTHOPAEDIC SURGERY

## 2021-12-16 NOTE — PATIENT INSTRUCTIONS
Continue weight-bearing as tolerated. Continue range of motion exercises as instructed. Ice and elevate as needed. Tylenol or Motrin for pain. We will schedule surgery at soonest convenience. If you have any questions regarding your surgery please call our office and ask to speak with Anushka Jennings [phone] for hardware removal   continue same restrictions until your first post op appt.

## 2021-12-16 NOTE — LETTER
[facility]  [address]  Phone: [phone]  Fax: [phone]    Giulia Olsen MD        December 16, 2021     Patient: Leola Polo   YOB: 1970   Date of Visit: 12/16/2021       To Whom It May Concern: It is my medical opinion that Leola Polo may return to light duty immediately with the following restrictions: lifting/carrying not to exceed 10 lbs. , pushing/pulling not to exceed 10 lbs. , no ladders, no stairs, no ramp, avoid excessive walking or standing until cleared by physician. If you have any questions or concerns, please don't hesitate to call.     Sincerely,        Giulia Olsen MD

## 2021-12-16 NOTE — PROGRESS NOTES
Pt, Whitneywatson Ponce is a 54 y.o. male returning to the office following a left ankle ORIF on 7/3/21. Pt states that he feels good and is doing well. He states that he still has stiffness in the joint but feels as though it is loosening up and healing. Pt states he is only taking aspirin to treat the pain with moderate relief. Pt denies any new injuries.

## 2021-12-17 ASSESSMENT — ENCOUNTER SYMPTOMS
EYE PAIN: 0
COLOR CHANGE: 0
VOMITING: 0
WHEEZING: 0
CHEST TIGHTNESS: 0
SHORTNESS OF BREATH: 0
EYE REDNESS: 0

## 2021-12-17 NOTE — PROGRESS NOTES
12/16/2021   Chief Complaint   Patient presents with    Post-Op Check     left ankle ORIF        History of Present Illness:                             Marian Tejada is a 54 y.o. male returns today for follow-up of his left ankle. He continues to have some mild drainage the lateral aspect of his ankle. Overall he feels that the ankle is strong and stable and functioning well for his normal daily activities. He is doing well at work. He still has some mild irritation and redness along the surgical site. There are 2 areas continue to have some intermittent mild drainage and irritation with scabbing as well. He denies any fevers or chills or other infectious symptoms. No pain or swelling at the anterior medial aspect of the ankle joint. Pt, Marian Tejada is a 54 y.o. male returning to the office following a left ankle ORIF on 7/3/21. Pt states that he feels good and is doing well. He states that he still has stiffness in the joint but feels as though it is loosening up and healing. Pt states he is only taking aspirin to treat the pain with moderate relief. Pt denies any new injuries. Medical History  Patient's medications, allergies, past medical, surgical, social and family histories were reviewed and updated as appropriate.     Past Medical History:   Diagnosis Date    Fracture     in ER 7/24/2021 fx left ankle- for surg 7/30/2021    History of alcohol abuse     Pericarditis     \"in 2015\" no longer forllow with cardiologist    Sleep apnea     had sleep study 2014- uses cpap     Past Surgical History:   Procedure Laterality Date    ANKLE FRACTURE SURGERY Left 7/30/2021    LEFT ANKLE OPEN REDUCTION INTERNAL FIXATION performed by Amanda Horner MD at [facility] OR     Family History   Problem Relation Age of Onset    Breast Cancer Mother     Cancer Father         lung cancer     Social History     Socioeconomic History    Marital status:      Spouse name: None    Number of children: None  Years of education: None    Highest education level: None   Occupational History    None   Tobacco Use    Smoking status: Current Every Day Smoker     Packs/day: 1.50     Years: 25.00     Pack years: 37.50     Types: Cigarettes    Smokeless tobacco: Never Used   Vaping Use    Vaping Use: Never used   Substance and Sexual Activity    Alcohol use: Yes     Comment: average\" 5-6 beers per day\"    Drug use: Not Currently     Types: Marijuana Elke Kos)     Comment: \"20+ yrs ago \"    Sexual activity: None   Other Topics Concern    None   Social History Narrative    None     Social Determinants of Health     Financial Resource Strain:     Difficulty of Paying Living Expenses: Not on file   Food Insecurity:     Worried About Running Out of Food in the Last Year: Not on file    Tung of Food in the Last Year: Not on file   Transportation Needs:     Lack of Transportation (Medical): Not on file    Lack of Transportation (Non-Medical):  Not on file   Physical Activity:     Days of Exercise per Week: Not on file    Minutes of Exercise per Session: Not on file   Stress:     Feeling of Stress : Not on file   Social Connections:     Frequency of Communication with Friends and Family: Not on file    Frequency of Social Gatherings with Friends and Family: Not on file    Attends Orthodoxy Services: Not on file    Active Member of [address] [place] [address] or Organizations: Not on file    Attends Club or Organization Meetings: Not on file    Marital Status: Not on file   Intimate Partner Violence:     Fear of Current or Ex-Partner: Not on file    Emotionally Abused: Not on file    Physically Abused: Not on file    Sexually Abused: Not on file   Housing Stability:     Unable to Pay for Housing in the Last Year: Not on file    Number of [place] in the Last Year: Not on file    Unstable Housing in the Last Year: Not on file     Current Outpatient Medications   Medication Sig Dispense Refill    aspirin 325 MG EC tablet Take 1 tablet by mouth daily 30 tablet 0    cephALEXin (KEFLEX) 500 MG capsule Take 1 capsule by mouth 3 times daily (Patient not taking: Reported on 11/18/2021) 21 capsule 0    cephALEXin (KEFLEX) 500 MG capsule Take 1 capsule by mouth 3 times daily (Patient not taking: Reported on 11/18/2021) 30 capsule 0     No current facility-administered medications for this visit. No Known Allergies      Review of Systems   Constitutional: Negative for chills and fever. HENT: Negative for congestion and sneezing. Eyes: Negative for pain and redness. Respiratory: Negative for chest tightness, shortness of breath and wheezing. Cardiovascular: Negative for chest pain and palpitations. Gastrointestinal: Negative for vomiting. Musculoskeletal: Negative for arthralgias. Skin: Negative for color change and rash. Neurological: Negative for weakness and numbness. Psychiatric/Behavioral: Negative for agitation. The patient is not nervous/anxious. Examination:  General Exam:  Vitals: Pulse 89   Resp 16   Ht 6' (1.829 m)   Wt 235 lb (106.6 kg)   SpO2 96%   BMI 31.87 kg/m²    Physical Exam  Vitals and nursing note reviewed. Constitutional:       Appearance: Normal appearance. HENT:      Head: Normocephalic and atraumatic. Eyes:      Conjunctiva/sclera: Conjunctivae normal.      Pupils: Pupils are equal, round, and reactive to light. Pulmonary:      Effort: Pulmonary effort is normal.   Musculoskeletal:      Cervical back: Normal range of motion. Left knee: No swelling, effusion or ecchymosis. Normal range of motion. No tenderness. Right ankle: No swelling, deformity, ecchymosis or lacerations. No tenderness. Normal range of motion. Normal pulse. Right Achilles Tendon: Normal.      Comments: Left lower extremity:  There is moderate erythema just surrounding the lateral aspect of the ankle at the healing surgical site.   No active drainage or purulence today. There are 2 areas each about 5 mm in diameter along the healing surgical scar with fibrous material and following debridement there was healthy granulation tissue present. No exposed hardware present. Sensation motor functions intact throughout the foot. Excellent alignment and stability of the ankle. Strength is 5/5 with ankle flexion and extension and inversion eversion of the hindfoot. No tenderness to palpation at the anterior or medial aspects of the ankle. Skin:     General: Skin is warm and dry. Neurological:      Mental Status: He is alert and oriented to person, place, and time. Psychiatric:         Mood and Affect: Mood normal.         Behavior: Behavior normal.            Diagnostic testing:  X-ray images were reviewed by myself and discussed with the patient:  Narrative   3 views of the left ankle show excellent alignment status post open    reduction and internal fixation of the lateral malleolus.  Stable    alignment of the hardware.  Progression of the healing process across the    fracture site.  Normal alignment ankle mortise and syndesmosis with no    clear space widening. Office Procedures:  No orders of the defined types were placed in this encounter. Assessment and Plan  1. Left ankle painful hardware    2. Left ankle nonhealing wound and possible low-grade chronic infection around the hardware    I performed a debridement of his surgical site today. There is no obvious fluid collection or severe infection today. I explained that I am concerned about his lack of completion of the healing process. I believe that in order to get the surgical site to resolve the healing process, we will need to proceed with hardware removal.    I recommended surgical intervention for hardware removal with irrigation and debridement and wound closure over the lateral aspect of the ankle.   We discussed the potential for need continue with antibiotic therapy following hardware removal.  Explained that he will be able to weight-bear as tolerated and should have a fairly short amount of downtime following this procedure but he may need about 4weeks off of work in order to allow the surgical site to heal well. We discussed the risks and benefits of surgery. All his questions were answered. I do not feel that is likely his ankle will completely resolve and heal without removal of the hardware. Plan will be to take some cultures and evaluate for any signs of deep infection at the time of the hardware removal.    He will check with work and we will schedule this at his earliest convenience.     Electronically signed by Kareem Rincon MD on 12/17/2021 at 10:28 AM

## 2021-12-23 DIAGNOSIS — Z01.812 ENCOUNTER FOR PREOPERATIVE SCREENING LABORATORY TESTING FOR COVID-19 VIRUS: Primary | ICD-10-CM

## 2021-12-23 DIAGNOSIS — Z20.822 ENCOUNTER FOR PREOPERATIVE SCREENING LABORATORY TESTING FOR COVID-19 VIRUS: Primary | ICD-10-CM

## 2022-01-07 NOTE — PROGRESS NOTES
1/7/22 at 1059 I left a voicemail message with my call back number and a request to return my call to complete the phone PAT assessment. I did leave pre-op instructions. 1/7/22 at 1240 I left a voicemail for the secondary number,  the patients mother. 1/12/22 at [phone] I left another voicemail for patient to return my call. Addendum: Patient called me on 1/12/22 at [phone] and completed the phone PAT assessment. Surgery  1/19/22  you will be called    1/18/22  with times               1. Do not eat or drink anything after midnight - unless instructed by your doctor prior to surgery. This includes                   no water, chewing gum or mints. 2. Follow your directions as prescribed by the doctor for your procedure and medications. 3. Check with your Doctor regarding stopping vitamins, supplements, blood thinners (Plavix, Coumadin, Lovenox, Effient, Pradaxa, Xarelto, Fragmin or                   other blood thinners) and follow their instructions. Stop all supplements and herbals 7 days before surgery. Hold NSAID's 5 days prior to surgery. Morning of surgery do not take any medications. 4. Do not smoke, and do not drink any alcoholic beverages 24 hours prior to surgery. This includes NA Beer. 5. You may brush your teeth and gargle the morning of surgery. DO NOT SWALLOW WATER   6. You MUST make arrangements for a responsible adult to take you home after your surgery and be able to check on you every couple                   hours for the day. You will not be allowed to leave alone or drive yourself home. It is strongly suggested someone stay with you the first 24                   hrs. Your surgery will be cancelled if you do not have a ride home. 7. Please wear simple, loose fitting clothing to the hospital.  [place] not bring valuables (money, credit cards, checkbooks, etc.) Do not wear any                   makeup (including no eye makeup) or nail polish on your fingers or toes. 8. DO NOT wear any jewelry or piercings on day of surgery. All body piercing jewelry must be removed. 9. If you have dentures, they will be removed before going to the OR; we will provide you a container. If you wear contact lenses or glasses,                  they will be removed; please bring a case for them. 10. If you  have a Living Will and Durable Power of  for Healthcare, please bring in a copy. 11. Please bring picture ID,  insurance card, paperwork from the doctors office    (H & P, Consent, & card for implantable devices). 12. Take a shower the night before or morning of your procedure, do not apply any lotion, oil or powder. 13. Wear a mask covering your nose & mouth when entering the hospital. Have your covid-19 test performed within 2-7 days of your                  surgery. Quarantine yourself after the test until after your surgery.   COVID TEST 1/17/22

## 2022-01-12 NOTE — PROGRESS NOTES
1/12/22 at 0900   I left a message on the surgery scheduling voicemail at Dr. Patricia Lewis office that patient has been called and left voicemails twice and his mother was called and a voicemail left also. Patient has not returned call to complete phone PAT. Can someone from the office reach out to patient please.

## 2022-01-13 ENCOUNTER — HOSPITAL ENCOUNTER (OUTPATIENT)
Age: 52
Setting detail: SPECIMEN
Discharge: HOME | End: 2022-01-13
Payer: COMMERCIAL

## 2022-01-13 PROCEDURE — U0003 INFECTIOUS AGENT DETECTION BY NUCLEIC ACID (DNA OR RNA); SEVERE ACUTE RESPIRATORY SYNDROME CORONAVIRUS 2 (SARS-COV-2) (CORONAVIRUS DISEASE [COVID-19]), AMPLIFIED PROBE TECHNIQUE, MAKING USE OF HIGH THROUGHPUT TECHNOLOGIES AS DESCRIBED BY CMS-2020-01-R: HCPCS

## 2022-01-13 PROCEDURE — U0005 INFEC AGEN DETEC AMPLI PROBE: HCPCS

## 2022-01-14 LAB
SARS-COV-2: NOT DETECTED
SOURCE: NORMAL

## 2022-01-18 ENCOUNTER — ANESTHESIA EVENT (OUTPATIENT)
Dept: OPERATING ROOM | Age: 52
End: 2022-01-18
Payer: COMMERCIAL

## 2022-01-18 ASSESSMENT — LIFESTYLE VARIABLES: SMOKING_STATUS: 1

## 2022-01-18 NOTE — ANESTHESIA PRE PROCEDURE
(1.829 m)                                              BP Readings from Last 3 Encounters:   07/30/21 135/82   07/30/21 (!) 157/79   07/24/21 (!) 145/87       NPO Status:                                                                                 BMI:   Wt Readings from Last 3 Encounters:   12/16/21 235 lb (106.6 kg)   11/18/21 235 lb (106.6 kg)   10/14/21 235 lb (106.6 kg)     Body mass index is 33.23 kg/m². CBC:   Lab Results   Component Value Date    WBC 7.0 07/30/2021    RBC 5.43 07/30/2021    HGB 18.0 07/30/2021    HCT 53.1 07/30/2021    MCV 97.8 07/30/2021    RDW 14.2 07/30/2021     07/30/2021       CMP:   Lab Results   Component Value Date     07/30/2021    K 5.0 07/30/2021    CL 89 07/30/2021    CO2 27 07/30/2021    BUN 6 07/30/2021    CREATININE 0.5 07/30/2021    GFRAA >60 07/30/2021    LABGLOM >60 07/30/2021    GLUCOSE 120 07/30/2021    PROT 7.4 07/30/2021    CALCIUM 9.3 07/30/2021    BILITOT 0.6 07/30/2021    ALKPHOS 130 07/30/2021    AST 34 07/30/2021    ALT 29 07/30/2021       POC Tests: No results for input(s): POCGLU, POCNA, POCK, POCCL, POCBUN, POCHEMO, POCHCT in the last 72 hours.     Coags:   Lab Results   Component Value Date    PROTIME 11.4 07/30/2021    INR 0.88 07/30/2021    APTT 30.8 07/30/2021       HCG (If Applicable): No results found for: PREGTESTUR, PREGSERUM, HCG, HCGQUANT     ABGs: No results found for: PHART, PO2ART, DXS6VVY, RBX6DPK, BEART, U9XPWATP     Type & Screen (If Applicable):  No results found for: LABABO, LABRH    Drug/Infectious Status (If Applicable):  No results found for: HIV, HEPCAB    COVID-19 Screening (If Applicable):   Lab Results   Component Value Date    COVID19 NOT DETECTED 01/13/2022           Anesthesia Evaluation  Patient summary reviewed no history of anesthetic complications:   Airway: Mallampati: II  TM distance: <3 FB   Neck ROM: full  Mouth opening: > = 3 FB Dental: normal exam         Pulmonary:normal exam    (+) sleep apnea: on CPAP, current smoker                           Cardiovascular:             Beta Blocker:  Not on Beta Blocker      ROS comment: H/O Pericarditis in 2015     Neuro/Psych:                ROS comment: etoh  GI/Hepatic/Renal:   (+) morbid obesity          Endo/Other:                      ROS comment: History of alcohol abuse    in ER 7/24/2021 fx left ankle- for surg 7/30/2021 Abdominal:             Vascular: Other Findings:           Anesthesia Plan      general     ASA 2       Induction: intravenous. MIPS: Postoperative opioids intended and Prophylactic antiemetics administered. Anesthetic plan and risks discussed with patient. Plan discussed with attending. Pre Anesthesia Evaluation complete. Anesthesia plan, risks, benefits, alternatives, and personnel discussed with patient and/or legal guardian. Patient and/or legal guardian verbalized an understanding and agreed to proceed. Anesthesia plan discussed with care team members and agreed upon.   KISHA Patino - CRNA  1/18/2022

## 2022-01-19 ENCOUNTER — ANESTHESIA (OUTPATIENT)
Dept: OPERATING ROOM | Age: 52
End: 2022-01-19
Payer: COMMERCIAL

## 2022-01-19 ENCOUNTER — APPOINTMENT (OUTPATIENT)
Dept: GENERAL RADIOLOGY | Age: 52
End: 2022-01-19
Attending: ORTHOPAEDIC SURGERY
Payer: COMMERCIAL

## 2022-01-19 ENCOUNTER — HOSPITAL ENCOUNTER (OUTPATIENT)
Age: 52
Setting detail: OUTPATIENT SURGERY
Discharge: HOME | End: 2022-01-19
Attending: ORTHOPAEDIC SURGERY | Admitting: ORTHOPAEDIC SURGERY
Payer: COMMERCIAL

## 2022-01-19 VITALS
OXYGEN SATURATION: 99 % | SYSTOLIC BLOOD PRESSURE: 121 MMHG | RESPIRATION RATE: 8 BRPM | TEMPERATURE: 97.3 F | DIASTOLIC BLOOD PRESSURE: 74 MMHG

## 2022-01-19 VITALS
HEIGHT: 72 IN | HEART RATE: 72 BPM | OXYGEN SATURATION: 95 % | DIASTOLIC BLOOD PRESSURE: 84 MMHG | SYSTOLIC BLOOD PRESSURE: 142 MMHG | TEMPERATURE: 97.6 F | RESPIRATION RATE: 16 BRPM | BODY MASS INDEX: 33.18 KG/M2 | WEIGHT: 245 LBS

## 2022-01-19 DIAGNOSIS — T84.84XA PAIN DUE TO INTERNAL ORTHOPEDIC PROSTHETIC DEVICES, IMPLANTS AND GRAFTS, INITIAL ENCOUNTER (HCC): ICD-10-CM

## 2022-01-19 PROCEDURE — 3600000003 HC SURGERY LEVEL 3 BASE: Performed by: ORTHOPAEDIC SURGERY

## 2022-01-19 PROCEDURE — 7100000011 HC PHASE II RECOVERY - ADDTL 15 MIN: Performed by: ORTHOPAEDIC SURGERY

## 2022-01-19 PROCEDURE — 2709999900 HC NON-CHARGEABLE SUPPLY: Performed by: ORTHOPAEDIC SURGERY

## 2022-01-19 PROCEDURE — 2500000003 HC RX 250 WO HCPCS: Performed by: ORTHOPAEDIC SURGERY

## 2022-01-19 PROCEDURE — 6360000002 HC RX W HCPCS: Performed by: NURSE ANESTHETIST, CERTIFIED REGISTERED

## 2022-01-19 PROCEDURE — 76000 FLUOROSCOPY <1 HR PHYS/QHP: CPT

## 2022-01-19 PROCEDURE — 87205 SMEAR GRAM STAIN: CPT

## 2022-01-19 PROCEDURE — 7100000010 HC PHASE II RECOVERY - FIRST 15 MIN: Performed by: ORTHOPAEDIC SURGERY

## 2022-01-19 PROCEDURE — 87077 CULTURE AEROBIC IDENTIFY: CPT

## 2022-01-19 PROCEDURE — 2580000003 HC RX 258: Performed by: ANESTHESIOLOGY

## 2022-01-19 PROCEDURE — 7100000001 HC PACU RECOVERY - ADDTL 15 MIN: Performed by: ORTHOPAEDIC SURGERY

## 2022-01-19 PROCEDURE — 6360000002 HC RX W HCPCS: Performed by: ORTHOPAEDIC SURGERY

## 2022-01-19 PROCEDURE — 87075 CULTR BACTERIA EXCEPT BLOOD: CPT

## 2022-01-19 PROCEDURE — 87070 CULTURE OTHR SPECIMN AEROBIC: CPT

## 2022-01-19 PROCEDURE — 3700000000 HC ANESTHESIA ATTENDED CARE: Performed by: ORTHOPAEDIC SURGERY

## 2022-01-19 PROCEDURE — 3700000001 HC ADD 15 MINUTES (ANESTHESIA): Performed by: ORTHOPAEDIC SURGERY

## 2022-01-19 PROCEDURE — 20680 REMOVAL OF IMPLANT DEEP: CPT | Performed by: ORTHOPAEDIC SURGERY

## 2022-01-19 PROCEDURE — 3600000013 HC SURGERY LEVEL 3 ADDTL 15MIN: Performed by: ORTHOPAEDIC SURGERY

## 2022-01-19 PROCEDURE — 7100000000 HC PACU RECOVERY - FIRST 15 MIN: Performed by: ORTHOPAEDIC SURGERY

## 2022-01-19 RX ORDER — FENTANYL CITRATE 50 UG/ML
INJECTION, SOLUTION INTRAMUSCULAR; INTRAVENOUS PRN
Status: DISCONTINUED | OUTPATIENT
Start: 2022-01-19 | End: 2022-01-19 | Stop reason: SDUPTHER

## 2022-01-19 RX ORDER — LIDOCAINE HYDROCHLORIDE 20 MG/ML
INJECTION, SOLUTION INTRAVENOUS PRN
Status: DISCONTINUED | OUTPATIENT
Start: 2022-01-19 | End: 2022-01-19 | Stop reason: SDUPTHER

## 2022-01-19 RX ORDER — CEFAZOLIN SODIUM 2 G/100ML
2000 INJECTION, SOLUTION INTRAVENOUS
Status: COMPLETED | OUTPATIENT
Start: 2022-01-19 | End: 2022-01-19

## 2022-01-19 RX ORDER — KETOROLAC TROMETHAMINE 30 MG/ML
INJECTION, SOLUTION INTRAMUSCULAR; INTRAVENOUS PRN
Status: DISCONTINUED | OUTPATIENT
Start: 2022-01-19 | End: 2022-01-19 | Stop reason: SDUPTHER

## 2022-01-19 RX ORDER — PROPOFOL 10 MG/ML
INJECTION, EMULSION INTRAVENOUS PRN
Status: DISCONTINUED | OUTPATIENT
Start: 2022-01-19 | End: 2022-01-19 | Stop reason: SDUPTHER

## 2022-01-19 RX ORDER — DEXAMETHASONE SODIUM PHOSPHATE 4 MG/ML
INJECTION, SOLUTION INTRA-ARTICULAR; INTRALESIONAL; INTRAMUSCULAR; INTRAVENOUS; SOFT TISSUE PRN
Status: DISCONTINUED | OUTPATIENT
Start: 2022-01-19 | End: 2022-01-19 | Stop reason: SDUPTHER

## 2022-01-19 RX ORDER — CEPHALEXIN 500 MG/1
500 CAPSULE ORAL 3 TIMES DAILY
Qty: 21 CAPSULE | Refills: 0 | Status: SHIPPED | OUTPATIENT
Start: 2022-01-19 | End: 2022-01-26

## 2022-01-19 RX ORDER — BUPIVACAINE HYDROCHLORIDE 5 MG/ML
INJECTION, SOLUTION PERINEURAL
Status: COMPLETED | OUTPATIENT
Start: 2022-01-19 | End: 2022-01-19

## 2022-01-19 RX ORDER — SODIUM CHLORIDE, SODIUM LACTATE, POTASSIUM CHLORIDE, CALCIUM CHLORIDE 600; 310; 30; 20 MG/100ML; MG/100ML; MG/100ML; MG/100ML
INJECTION, SOLUTION INTRAVENOUS CONTINUOUS
Status: DISCONTINUED | OUTPATIENT
Start: 2022-01-19 | End: 2022-01-19 | Stop reason: HOSPADM

## 2022-01-19 RX ORDER — HYDROCODONE BITARTRATE AND ACETAMINOPHEN 5; 325 MG/1; MG/1
1 TABLET ORAL EVERY 6 HOURS PRN
Qty: 12 TABLET | Refills: 0 | Status: SHIPPED | OUTPATIENT
Start: 2022-01-19 | End: 2022-01-22

## 2022-01-19 RX ORDER — ONDANSETRON 2 MG/ML
INJECTION INTRAMUSCULAR; INTRAVENOUS PRN
Status: DISCONTINUED | OUTPATIENT
Start: 2022-01-19 | End: 2022-01-19 | Stop reason: SDUPTHER

## 2022-01-19 RX ADMIN — FENTANYL CITRATE 50 MCG: 50 INJECTION, SOLUTION INTRAMUSCULAR; INTRAVENOUS at 11:23

## 2022-01-19 RX ADMIN — PROPOFOL 200 MG: 10 INJECTION, EMULSION INTRAVENOUS at 11:16

## 2022-01-19 RX ADMIN — ONDANSETRON 4 MG: 2 INJECTION INTRAMUSCULAR; INTRAVENOUS at 12:02

## 2022-01-19 RX ADMIN — FENTANYL CITRATE 100 MCG: 50 INJECTION, SOLUTION INTRAMUSCULAR; INTRAVENOUS at 11:13

## 2022-01-19 RX ADMIN — DEXAMETHASONE SODIUM PHOSPHATE 4 MG: 4 INJECTION, SOLUTION INTRAMUSCULAR; INTRAVENOUS at 11:20

## 2022-01-19 RX ADMIN — FENTANYL CITRATE 50 MCG: 50 INJECTION, SOLUTION INTRAMUSCULAR; INTRAVENOUS at 11:38

## 2022-01-19 RX ADMIN — LIDOCAINE HYDROCHLORIDE 100 MG: 20 INJECTION, SOLUTION INTRAVENOUS at 11:16

## 2022-01-19 RX ADMIN — SODIUM CHLORIDE, POTASSIUM CHLORIDE, SODIUM LACTATE AND CALCIUM CHLORIDE: 600; 310; 30; 20 INJECTION, SOLUTION INTRAVENOUS at 12:02

## 2022-01-19 RX ADMIN — CEFAZOLIN SODIUM 2000 MG: 2 INJECTION, SOLUTION INTRAVENOUS at 11:25

## 2022-01-19 RX ADMIN — SODIUM CHLORIDE, POTASSIUM CHLORIDE, SODIUM LACTATE AND CALCIUM CHLORIDE: 600; 310; 30; 20 INJECTION, SOLUTION INTRAVENOUS at 09:39

## 2022-01-19 RX ADMIN — KETOROLAC TROMETHAMINE 30 MG: 30 INJECTION, SOLUTION INTRAMUSCULAR; INTRAVENOUS at 12:06

## 2022-01-19 ASSESSMENT — PULMONARY FUNCTION TESTS
PIF_VALUE: 1
PIF_VALUE: 13
PIF_VALUE: 12
PIF_VALUE: 4
PIF_VALUE: 1
PIF_VALUE: 12
PIF_VALUE: 13
PIF_VALUE: 1
PIF_VALUE: 3
PIF_VALUE: 25
PIF_VALUE: 4
PIF_VALUE: 7
PIF_VALUE: 13
PIF_VALUE: 12
PIF_VALUE: 3
PIF_VALUE: 4
PIF_VALUE: 3
PIF_VALUE: 11
PIF_VALUE: 1
PIF_VALUE: 4
PIF_VALUE: 1
PIF_VALUE: 3
PIF_VALUE: 2
PIF_VALUE: 1
PIF_VALUE: 13
PIF_VALUE: 11
PIF_VALUE: 15
PIF_VALUE: 12
PIF_VALUE: 4
PIF_VALUE: 1
PIF_VALUE: 13
PIF_VALUE: 12
PIF_VALUE: 4
PIF_VALUE: 12
PIF_VALUE: 2
PIF_VALUE: 13
PIF_VALUE: 12
PIF_VALUE: 3
PIF_VALUE: 25
PIF_VALUE: 13
PIF_VALUE: 11
PIF_VALUE: 4
PIF_VALUE: 13
PIF_VALUE: 11
PIF_VALUE: 21
PIF_VALUE: 3
PIF_VALUE: 13
PIF_VALUE: 11
PIF_VALUE: 3
PIF_VALUE: 13
PIF_VALUE: 12
PIF_VALUE: 11
PIF_VALUE: 4
PIF_VALUE: 1
PIF_VALUE: 3
PIF_VALUE: 12
PIF_VALUE: 12
PIF_VALUE: 11
PIF_VALUE: 15
PIF_VALUE: 14
PIF_VALUE: 3
PIF_VALUE: 12
PIF_VALUE: 12
PIF_VALUE: 4
PIF_VALUE: 12

## 2022-01-19 ASSESSMENT — ENCOUNTER SYMPTOMS
WHEEZING: 0
VOMITING: 0
EYE PAIN: 0
COLOR CHANGE: 0
SHORTNESS OF BREATH: 0
EYE REDNESS: 0
CHEST TIGHTNESS: 0

## 2022-01-19 ASSESSMENT — PAIN DESCRIPTION - LOCATION
LOCATION: ANKLE
LOCATION: ANKLE

## 2022-01-19 ASSESSMENT — PAIN - FUNCTIONAL ASSESSMENT: PAIN_FUNCTIONAL_ASSESSMENT: 0-10

## 2022-01-19 ASSESSMENT — PAIN DESCRIPTION - PAIN TYPE
TYPE: SURGICAL PAIN
TYPE: SURGICAL PAIN

## 2022-01-19 ASSESSMENT — PAIN DESCRIPTION - ORIENTATION: ORIENTATION: LEFT

## 2022-01-19 ASSESSMENT — PAIN SCALES - GENERAL
PAINLEVEL_OUTOF10: 4
PAINLEVEL_OUTOF10: 3

## 2022-01-19 NOTE — OP NOTE
Operative Note      Patient: Bridget Rock  YOB: 1970  MRN: [id number]    Date of Procedure: 1/19/2022    Pre-Op Diagnosis: Pain due to internal orthopedic prosthetic devices, implants and grafts, initial encounter [facility] Theresa Hidalgo    Post-Op Diagnosis: Same       Procedure(s):  LEFT ANKLE HARDWARE REMOVAL    Surgeon(s):  Maria Isabel Myrick MD    Assistant:   First Assistant: Adair Thompson    Anesthesia: General    Estimated Blood Loss (mL): less than 403     Complications: None    Specimens:   ID Type Source Tests Collected by Time Destination   1 : LEFT ANKLE CULTURE Tissue Tissue CULTURE, SURGICAL Maria Isabel Myrick MD 1/19/2022 1156        Implants:  Implant Name Type Inv.  Item Serial No.  Lot No. LRB No. Used Action   PLATE BNE T20DI 4 H ST L LAT DST FIBULAR S STL NITISH COMPR  PLATE BNE [id number] 4 H ST L LAT DST FIBULAR S STL NITISH COMPR  DEPUY [employer]-WD [id number] Left 1 Explanted   SCREW BNE L10MM DIA2.7MM ART S STL ST NITISH FULL THRD T8  SCREW BNE L10MM DIA2.7MM ART S STL ST NITISH FULL THRD T8  DEPUY [employer]WD  Left 1 Explanted   SCREW BNE L14MM DIA2.7MM ART S STL ST NITISH FULL THRD T8  SCREW BNE L14MM DIA2.7MM ART S STL ST NITISH FULL THRD T8  DEPUY [employer]-WD  Left 1 Explanted   SCREW BNE L14MM DIA3.5MM ART S STL ST NONCANNULATED NITISH  SCREW BNE L14MM DIA3.5MM ART S STL ST NONCANNULATED NTIISH  DEPUY [employer]WD  Left 1 Explanted   SCREW BNE L16MM DIA2.7MM ART S STL ST NITISH FULL THRD T8  SCREW BNE L16MM DIA2.7MM ART S STL ST NITISH FULL THRD T8  DEPUY [employer]WD  Left 1 Explanted   SCREW BNE L16MM DIA3.5MM ART S STL ST NITISH FULL THRD  SCREW BNE L16MM DIA3.5MM ART S STL ST NITISH FULL THRD  DEPUY [employer]WD  Left 2 Explanted   SCREW BNE L16MM DIA3.5MM ART S STL ST NONCANNULATED NITISH  SCREW BNE L16MM DIA3.5MM ART S STL ST NONCANNULATED NITISH  [employer] [facility] [employer]-WD  Left 2 Explanted   SCREW BNE L22MM DIA3.5MM ART S STL ST NONCANNULATED NITISH  SCREW BNE L22MM DIA3.5MM ART S STL ST NONCANNULATED NITISH  DEPUY [employer]  Left 1 Explanted         Drains: * No LDAs found *    Findings: Fracture was well-healed. No evidence of purulence or necrotic material.    Detailed Description of Procedure: The patient was identified the preoperative area and site was marked. He was taken back to the operating room placed supine on table. After induction of general endotracheal anesthesia, the left lower extremity was prepped and draped in sterile fashion with a thigh tourniquet that was never inflated. Timeout was performed and preoperative antibiotics were given. Incision was made through the previously well-healed scar over the lateral aspect of the ankle. There was a sinus tract present centrally at the surgical site. Full-thickness skin flaps were developed down to the fibular plate and bone. No necrotic material or purulent material was encountered. Culture swab was taken from the fluid and soft tissue adjacent to the fibular fracture and hardware. The fracture was well-healed and the hardware was stable and secure in its alignment. All of the screws were removed from the plate and then the plate was easily elevated and removed from the side of the fibula. The rongeur was used to clear bony debris and soft tissue along the lateral border the fibula. Retractors were placed exposing the anterior aspect of the fibula fracture site where the interfragmentary screw was in place. This was removed in addition and the screw holes were thoroughly debrided and irrigated with 2 L of normal saline throughout the surgical site. X-rays were taken confirming complete removal of the hardware. Stress examination was performed which revealed excellent stability at the ankle and excellent healing through the previous fracture site.     The surgical wound was debrided sharply with a 15 blade to remove scar tissue from the sinus tract in order to have healthy bleeding tissue for wound closure. The wound was closed in layers with buried 2-0 Vicryl. The skin was closed with interrupted 3-0 nylon. The surgical site was injected with 10 mL of half percent Marcaine for postoperative pain relief. Sterile dressing was applied with Xeroform, 4 x 8's, ABD, sterile web roll, and Ace wrap. Patient was extubated and taken to PACU in stable condition. All sponge and needle counts were correct. Postoperative plan:  Patient will be allowed to perform weightbearing as tolerated and he will use his walking boot as needed for protection of the ankle. He was discharged with antibiotic therapy for 1 week.   He will follow-up in the office in 2 weeks for suture removal    Electronically signed by Karin Jeffers MD on 1/19/2022 at 12:18 PM

## 2022-01-19 NOTE — PROGRESS NOTES
1220: Patient arrived to PACU from OR. Monitors applied, alarms on. Surgical dressing is clean, dry and intact. Extremity elevated, ice applied. Report obtained from PAKO WESTFALL, Radha Nassar and Florentino ONEAL. 1230: Patient repositioned in bed, tolerating ice chips at this time. 1305: Phase 1 care complete. Patient transferred to [place] [id number], report given bedside to [facility].

## 2022-01-19 NOTE — H&P
Chief Complaint   Patient presents with    Post-Op Check       left ankle ORIF         History of Present Illness:                             Fer Barnes is a 54 y.o. male returns today for follow-up of his left ankle. He continues to have some mild drainage the lateral aspect of his ankle. Overall he feels that the ankle is strong and stable and functioning well for his normal daily activities. He is doing well at work. He still has some mild irritation and redness along the surgical site. There are 2 areas continue to have some intermittent mild drainage and irritation with scabbing as well. He denies any fevers or chills or other infectious symptoms. No pain or swelling at the anterior medial aspect of the ankle joint.        Pt, Fer Barnes is a 54 y.o. male returning to the office following a left ankle ORIF on 7/3/21. Pt states that he feels good and is doing well. He states that he still has stiffness in the joint but feels as though it is loosening up and healing. Pt states he is only taking aspirin to treat the pain with moderate relief. Pt denies any new injuries.             Medical History  Patient's medications, allergies, past medical, surgical, social and family histories were reviewed and updated as appropriate.     Past Medical History:   Diagnosis Date    Fracture     in ER 7/24/2021 fx left ankle- for surg 7/30/2021    History of alcohol abuse     Pericarditis     \"in 2015\" no longer forllow with cardiologist    Sleep apnea     had sleep study 2014- uses cpap     Past Surgical History:   Procedure Laterality Date    ANKLE FRACTURE SURGERY Left 7/30/2021    LEFT ANKLE OPEN REDUCTION INTERNAL FIXATION performed by Mauricio Giang MD at [address] [place] [address] OR     Family History   Problem Relation Age of Onset    Breast Cancer Mother     Cancer Father         lung cancer     Social History     Socioeconomic History    Marital status:      Spouse name: None    Number of children: None    Years of education: None    Highest education level: None   Occupational History    None   Tobacco Use    Smoking status: Current Every Day Smoker     Packs/day: 1.50     Years: 25.00     Pack years: 37.50     Types: Cigarettes    Smokeless tobacco: Never Used   Vaping Use    Vaping Use: Never used   Substance and Sexual Activity    Alcohol use: Yes     Comment: average\" 5-6 beers per day\"    Drug use: Not Currently     Types: Marijuana Chato Budge)     Comment: \"20+ yrs ago \"    Sexual activity: None   Other Topics Concern    None   Social History Narrative    None     Social Determinants of Health     Financial Resource Strain:     Difficulty of Paying Living Expenses: Not on file   Food Insecurity:     Worried About Running Out of Food in the Last Year: Not on file    Tnug of Food in the Last Year: Not on file   Transportation Needs:     Lack of Transportation (Medical): Not on file    Lack of Transportation (Non-Medical):  Not on file   Physical Activity:     Days of Exercise per Week: Not on file    Minutes of Exercise per Session: Not on file   Stress:     Feeling of Stress : Not on file   Social Connections:     Frequency of Communication with Friends and Family: Not on file    Frequency of Social Gatherings with Friends and Family: Not on file    Attends Zoroastrian Services: Not on file    Active Member of [address] [place] [address] or Organizations: Not on file    Attends Club or Organization Meetings: Not on file    Marital Status: Not on file   Intimate Partner Violence:     Fear of Current or Ex-Partner: Not on file    Emotionally Abused: Not on file    Physically Abused: Not on file    Sexually Abused: Not on file   Housing Stability:     Unable to Pay for Housing in the Last Year: Not on file    Number of [place] in the Last Year: Not on file    Unstable Housing in the Last Year: Not on file     Current Facility-Administered Medications   Medication Dose Route Frequency Provider Last Rate Last Admin    lactated ringers infusion   IntraVENous Continuous Do Yepez  mL/hr at 01/19/22 0939 New Bag at 01/19/22 0939    ceFAZolin (ANCEF) 2000 mg in dextrose 4 % 100 mL IVPB (premix)  2,000 mg IntraVENous On Call to OR Felipe Robbins MD         No Known Allergies      Review of Systems   Constitutional: Negative for chills and fever. HENT: Negative for congestion and sneezing. Eyes: Negative for pain and redness. Respiratory: Negative for chest tightness, shortness of breath and wheezing. Cardiovascular: Negative for chest pain and palpitations. Gastrointestinal: Negative for vomiting. Musculoskeletal: Positive for arthralgias. Skin: Negative for color change and rash. Neurological: Negative for weakness and numbness. Psychiatric/Behavioral: Negative for agitation. The patient is not nervous/anxious. Examination:  General Exam:  Vitals: BP (!) 144/78   Pulse 92   Temp 97.8 °F (36.6 °C) (Temporal)   Resp 16   Ht 6' (1.829 m)   Wt 245 lb (111.1 kg)   SpO2 98%   BMI 33.23 kg/m²    Physical Exam   Vitals and nursing note reviewed. Constitutional:       Appearance: Normal appearance. HENT:      Head: Normocephalic and atraumatic. Eyes:      Conjunctiva/sclera: Conjunctivae normal.      Pupils: Pupils are equal, round, and reactive to light. Pulmonary:      Effort: Pulmonary effort is normal.   Musculoskeletal:      Cervical back: Normal range of motion. Left knee: No swelling, effusion or ecchymosis. Normal range of motion. No tenderness. Right ankle: No swelling, deformity, ecchymosis or lacerations. No tenderness. Normal range of motion. Normal pulse. Right Achilles Tendon: Normal.      Comments: Left lower extremity:  There is moderate erythema just surrounding the lateral aspect of the ankle at the healing surgical site. No active drainage or purulence today.   There are 2 areas each about 5 mm in diameter along the healing surgical scar with fibrous material and following debridement there was healthy granulation tissue present. No exposed hardware present. Sensation motor functions intact throughout the foot. Excellent alignment and stability of the ankle. Strength is 5/5 with ankle flexion and extension and inversion eversion of the hindfoot. No tenderness to palpation at the anterior or medial aspects of the ankle. Skin:     General: Skin is warm and dry. Neurological:      Mental Status: He is alert and oriented to person, place, and time. Psychiatric:         Mood and Affect: Mood normal.         Behavior: Behavior normal.     Diagnostic testing:  X-ray images were reviewed by myself and discussed with the patient:  X-ray images were reviewed by myself and discussed with the patient:  Narrative   3 views of the left ankle show excellent alignment status post open    reduction and internal fixation of the lateral malleolus.  Stable    alignment of the hardware.  Progression of the healing process across the    fracture site.  Normal alignment ankle mortise and syndesmosis with no    clear space widening.               Office Procedures:  Orders Placed This Encounter   Procedures    FL LESS THAN 1 HOUR     Standing Status:   Standing     Number of Occurrences:   1     Order Specific Question:   Reason for exam:     Answer:   left ankle hardware removal       Assessment and Plan  1. Left ankle painful hardware     2. Left ankle nonhealing wound and possible low-grade chronic infection around the hardware     I performed a debridement of his surgical site today. There is no obvious fluid collection or severe infection today. I explained that I am concerned about his lack of completion of the healing process.   I believe that in order to get the surgical site to resolve the healing process, we will need to proceed with hardware removal.     I recommended surgical intervention for hardware removal with irrigation and debridement and wound closure over the lateral aspect of the ankle. We discussed the potential for need continue with antibiotic therapy following hardware removal.  Explained that he will be able to weight-bear as tolerated and should have a fairly short amount of downtime following this procedure but he may need about 4weeks off of work in order to allow the surgical site to heal well.     We discussed the risks and benefits of surgery. All his questions were answered. I do not feel that is likely his ankle will completely resolve and heal without removal of the hardware. Plan will be to take some cultures and evaluate for any signs of deep infection at the time of the hardware removal.       History and Physical exam note from the office visit is copied above from epic. I have reviewed the note and examined the patient and find no relevant changes.      I have reviewed with the patient and/or family the risks, benefits, and alternatives to the procedure as well as expected postoperative course    Love Stanley MD\       Electronically signed by Love Stanley MD on 1/19/2022 at 10:46 AM

## 2022-01-21 NOTE — ANESTHESIA POSTPROCEDURE EVALUATION
Department of Anesthesiology  Postprocedure Note    Patient: Ness Tai  MRN: [id number]  YOB: 1970  Date of evaluation: 1/21/2022  Time:  6:54 AM     Procedure Summary     Date: 01/19/22 Room / Location: [address]    Anesthesia Start: 1110 Anesthesia Stop: 1222    Procedure: LEFT ANKLE HARDWARE REMOVAL (Left Ankle) Diagnosis:       Pain due to internal orthopedic prosthetic devices, implants and grafts, initial encounter ([facility]      (Pain due to internal orthopedic prosthetic devices, implants and grafts, initial encounter [facility]) [D98.05KO])    Surgeons: Alberta Goldberg MD Responsible Provider: Samm Green MD    Anesthesia Type: general ASA Status: 2          Anesthesia Type: general    Natalie Phase I: Natalie Score: 10    Natalie Phase II: Natalie Score: 10    Last vitals: Reviewed and per EMR flowsheets.        Anesthesia Post Evaluation    Patient location during evaluation: PACU  Patient participation: complete - patient participated  Level of consciousness: awake and alert  Pain score: 0  Airway patency: patent  Nausea & Vomiting: no nausea and no vomiting  Complications: no  Cardiovascular status: blood pressure returned to baseline  Respiratory status: acceptable  Hydration status: euvolemic

## 2022-01-24 LAB
CULTURE: NORMAL
Lab: NORMAL
SPECIMEN: NORMAL

## 2022-02-02 ENCOUNTER — TELEPHONE (OUTPATIENT)
Dept: ORTHOPEDIC SURGERY | Age: 52
End: 2022-02-02

## 2022-02-02 DIAGNOSIS — T81.49XA WOUND INFECTION AFTER SURGERY: Primary | ICD-10-CM

## 2022-02-02 RX ORDER — SULFAMETHOXAZOLE AND TRIMETHOPRIM 800; 160 MG/1; MG/1
1 TABLET ORAL 2 TIMES DAILY
Qty: 14 TABLET | Refills: 0 | Status: SHIPPED | OUTPATIENT
Start: 2022-02-02 | End: 2022-02-09

## 2022-02-02 NOTE — TELEPHONE ENCOUNTER
Prescription sent for Bactrim. He can be added onto the office tomorrow for wound check if needed. He should do dressing changes twice a day with gauze to the area. He can clean it gently with peroxide if needed.

## 2022-02-02 NOTE — TELEPHONE ENCOUNTER
Patient called stating he is having a lot of drainage from his ankle. Pt is requesting an antibiotic be sent to his pharmacy.

## 2022-02-08 ENCOUNTER — OFFICE VISIT (OUTPATIENT)
Dept: ORTHOPEDIC SURGERY | Age: 52
End: 2022-02-08

## 2022-02-08 VITALS
WEIGHT: 245 LBS | OXYGEN SATURATION: 97 % | HEART RATE: 79 BPM | TEMPERATURE: 97.2 F | BODY MASS INDEX: 33.18 KG/M2 | HEIGHT: 72 IN | RESPIRATION RATE: 16 BRPM

## 2022-02-08 DIAGNOSIS — Z98.890 STATUS POST HARDWARE REMOVAL: Primary | ICD-10-CM

## 2022-02-08 PROCEDURE — 99024 POSTOP FOLLOW-UP VISIT: CPT

## 2022-02-08 NOTE — PROGRESS NOTES
2/8/2022   Chief Complaint   Patient presents with    Post-Op Check     left ankle hardwear removal        History of Present Illness:                             Lynn Boggs is a 54 y.o. male following up regarding hardware removal of his left ankle and to have his stitches taken out from his surgery DOS 1/19/2022. Patient has a significant history for venous stasis in both lower extremities that have complicated his wound healing process. This has been ongoing problem for the patient. He states that his healing wound does not show signs of infection such as erythema, fluctuance, or systemic fever or chills. However, the wound edges have split apart and his wound weeps clear fluid. He grew concerned of this issue last week and through a phone call to the office Dr. Chance Shrestha placed him on Bactrim which she has taken as directed and has 2 pills left. Patient admits to smoking 1 pack/day. He has returned to work as a  at [employer]. Mr. Lynn Boggs is here in follow up regarding his left ankle  He is doing rather well. He is having 2/10 pain. He denies chest pain, SOB, or calf pain,fever. He does have some wound drainage      Medical History  Patient's medications, allergies, past medical, surgical, social and family histories were reviewed and updated as appropriate. Review of Systems   Constitutional: Negative for fatigue and fever. HENT: Negative for facial swelling and rhinorrhea. Respiratory: Negative for cough and shortness of breath. Cardiovascular: Negative for chest pain. Gastrointestinal: Negative for nausea. Skin: Negative for pallor and rash. Neurological: Negative for facial asymmetry and speech difficulty. Psychiatric/Behavioral: Negative for agitation and confusion.                                                Examination:  General Exam:  Vitals: Pulse 79   Temp 97.2 °F (36.2 °C) (Temporal)   Resp 16   Ht 6' (1.829 m) the   distal fibula.       Moderate ankle soft tissue swelling, lateral greater than medial.         Office Procedures:  No orders of the defined types were placed in this encounter. Assessment and Plan  1. Status post left ankle hardware removal DOS 1/19/2022.  -During office visit I was able to remove the sutures and clean the wound site with alcohol and iodine swabs. After the site was cleaned and the sutures removed, I placed Steri-Strips as an attempt to bring the wound edges slightly closer and provide skin some tension. I I gave the patient some Steri-Strips to take home and instructed him on how to apply them. After Steri-Strips were put in place I covered the wound with gauze, an ABD dressing, and an Ace wrap. I instructed the patient to try his best while he is at work to keep the wound clean and dry gauze pads on his wound. While at home he can leave his wound open to air.  -Patient reassured that the wound lacks signs of systemic infection and that he can cease taking antibiotics after he finishes his course of Bactrim. Explained to him that if he sees new signs of infection that he should call the office and we can readminister antibiotics at that time.  -I consulted Dr. Miri Chappell to also serve the patient's wound and he agreed with the plan and suggested 1 week follow-up.           Electronically signed by Portia Kahn PA-C on 2/8/2022 at 2:41 PM

## 2022-02-08 NOTE — PATIENT INSTRUCTIONS
Weightbearing and activities as tolerated  May take Ibuprofen or Motrin as needed  Rest, ice, and elevate as needed  Follow up in 1 week

## 2022-02-08 NOTE — PROGRESS NOTES
Date of surgery:   January 19th     History:  Mr. Sandra Pinto is here in follow up regarding his left ankle  He is doing rather well. He is having 2/10 pain. He denies chest pain, SOB, or calf pain,fever.   He does have some wound drainage

## 2022-02-10 ASSESSMENT — ENCOUNTER SYMPTOMS
SHORTNESS OF BREATH: 0
FACIAL SWELLING: 0
NAUSEA: 0
RHINORRHEA: 0
COUGH: 0

## 2022-02-15 ENCOUNTER — OFFICE VISIT (OUTPATIENT)
Dept: ORTHOPEDIC SURGERY | Age: 52
End: 2022-02-15
Payer: COMMERCIAL

## 2022-02-15 VITALS
TEMPERATURE: 97.5 F | HEART RATE: 75 BPM | WEIGHT: 245 LBS | HEIGHT: 72 IN | RESPIRATION RATE: 16 BRPM | OXYGEN SATURATION: 99 % | BODY MASS INDEX: 33.18 KG/M2

## 2022-02-15 DIAGNOSIS — T81.49XA WOUND INFECTION AFTER SURGERY: Primary | ICD-10-CM

## 2022-02-15 PROCEDURE — 99213 OFFICE O/P EST LOW 20 MIN: CPT

## 2022-02-15 RX ORDER — SULFAMETHOXAZOLE AND TRIMETHOPRIM 400; 80 MG/1; MG/1
1 TABLET ORAL 2 TIMES DAILY
Qty: 20 TABLET | Refills: 0 | Status: SHIPPED | OUTPATIENT
Start: 2022-02-15 | End: 2022-02-25

## 2022-02-15 NOTE — PROGRESS NOTES
2/15/2022   Chief Complaint   Patient presents with    Post-Op Check     Left ankle hardware removal DOS 01/19/2022        History of Present Illness:                             Ruby Farmer is a 54 y.o. male today for continued follow-up of his left hardware removal DOS 1/19/2022 with emphasis on continued yet delayed wound healing. Patient states he feels his wound today is doing better than it was last week and he considers it to be noninfected. He admits that he has chronic venous insufficiency and is a 1 pack/day smoker. He stated that he saw his primary care physician yesterday and is committed to quitting smoking as he fully understands this is complicating his wound healing. Patient states he does not feel any structural problems with his ankle after the hardware removal and has no pain associated with his ankle joint or his continued wound healing. He admits that he has continued clear fluid draining from his open wound but that he is utilizing Ace wraps and gauze to keep the wound dry. He denies pussy drainage, surrounding erythema, or systemic signs of infection such as fever or chills. Mr. Ruby Farmer is here in follow up regarding his left ankle hardware removal  He is doing rather well. He is having 1/10 pain. He denies chest pain, SOB, calf pain,fever,wound drainage. No other issues. Medical History  Patient's medications, allergies, past medical, surgical, social and family histories were reviewed and updated as appropriate. Review of Systems   Review of systems not completed at this time due to the visit being a simple monitoring of continuing healing. Examination:  General Exam:  Vitals: Pulse 75   Temp 97.5 °F (36.4 °C) (Temporal)   Resp 16   Ht 6' (1.829 m)   Wt 245 lb (111.1 kg)   SpO2 99%   BMI 33.23 kg/m²    Physical Exam  Constitutional:       Appearance: Normal appearance. He is obese.    HENT:      Head: Normocephalic and atraumatic. Nose: No rhinorrhea. Eyes:      General: No scleral icterus. Right eye: No discharge. Left eye: No discharge. Pulmonary:      Effort: Pulmonary effort is normal. No respiratory distress. Musculoskeletal:      Cervical back: Normal range of motion. Right lower leg: Edema present. Left lower leg: Laceration present. No deformity, tenderness or bony tenderness. 2+ Pitting Edema present. Left ankle: Swelling and laceration present. No deformity or ecchymosis. No tenderness. Normal range of motion. Anterior drawer test negative. Normal pulse. Comments: Lateral side of patient left ankle shows continued healing incision site, sutures removed at prior visit. Site is draining clear fluid that drips down to patient's sock. Granulation tissue visible throughout incision, site appears more closely approximated than previous visit 1 week ago. Patient was without fluctuant drainage, erythema around wound site, or temperature change to surrounding tissues. Overall appearance of wound is improved since prior two visits. Skin:     General: Skin is warm and dry. Comments: Patient has prominent varicosities of both lower extremities and 2+ edema consistent with chronic venous insufficiency. Neurological:      General: No focal deficit present. Mental Status: He is alert and oriented to person, place, and time. Psychiatric:         Mood and Affect: Mood normal.         Behavior: Behavior normal.              Diagnostic testing:  X-rays reviewed in office, I independently reviewed the films in the office today:         Office Procedures:  No orders of the defined types were placed in this encounter. No new images taken at today's visit. Assessment and Plan  1. Wound infection after surgery    2.   Left ankle hardware removal DOS 1/19/2022  -Patient educated that his wound site from his hardware removal appears to be in a continually healing process with gradual improvements since his last visit. We discussed that his venous insufficiency and continued smoking are hindering the healing process. Patient stated that he is planning to quit smoking with the guidance of his primary care physician.  -Patient reassured that his wound does not appear infected at this time, however, it is still very susceptible to infection due to open nature and he was prescribed a 10-day course of Bactrim twice daily for 10 days.  -Patient instructed to keep clean gauze dressings on his ankle and to use an Ace wrap while at work. While at home and resting patient can keep his wound open to air with the intent of keeping his wound dry.  -Patient scheduled for 2-week follow-up for continued monitoring of his healing process.         Electronically signed by Patricia José PA-C on 2/15/2022 at 10:48 AM

## 2022-02-15 NOTE — PATIENT INSTRUCTIONS
Weightbearing and activities as tolerated  May take Ibuprofen or Motrin as needed  Rest, ice, and elevate as needed  Work on ROM and strengthening exercises as discussed  Follow up in 2 weeks

## 2022-02-15 NOTE — PROGRESS NOTES
Date of surgery:   January 19th     History:  Mr. Elicia Dangelo is here in follow up regarding his left ankle hardware removal  He is doing rather well. He is having 1/10 pain. He denies chest pain, SOB, calf pain,fever,wound drainage. No other issues.

## 2022-03-01 ENCOUNTER — OFFICE VISIT (OUTPATIENT)
Dept: ORTHOPEDIC SURGERY | Age: 52
End: 2022-03-01

## 2022-03-01 VITALS
HEIGHT: 72 IN | HEART RATE: 79 BPM | WEIGHT: 245 LBS | TEMPERATURE: 97.2 F | OXYGEN SATURATION: 97 % | BODY MASS INDEX: 33.18 KG/M2 | RESPIRATION RATE: 16 BRPM

## 2022-03-01 DIAGNOSIS — Z87.81 S/P ORIF (OPEN REDUCTION INTERNAL FIXATION) FRACTURE: Primary | ICD-10-CM

## 2022-03-01 DIAGNOSIS — Z98.890 S/P ORIF (OPEN REDUCTION INTERNAL FIXATION) FRACTURE: Primary | ICD-10-CM

## 2022-03-01 PROCEDURE — 99024 POSTOP FOLLOW-UP VISIT: CPT

## 2022-03-01 ASSESSMENT — ENCOUNTER SYMPTOMS
SHORTNESS OF BREATH: 0
FACIAL SWELLING: 0
COUGH: 0
RHINORRHEA: 0
NAUSEA: 0

## 2022-03-01 NOTE — PROGRESS NOTES
3/1/2022   Chief Complaint   Patient presents with    Post-Op Check     Left ankle hardware removal DOS 01/19/2022        History of Present Illness:                             Asher Rodriguez is a 54 y.o. male return to the office today for continued monitoring of his left ankle hardware removal DOS 1/19/2022 with emphasis on continued delayed wound healing. Patient states he feels that he has made significant gains in improvement in the way his incisional wound is healing within the last 2 weeks. Patient's pain level has decreased and he has noticed significantly less drainage from the wound site. Patient is here is still some drainage at times and some pain that he alleviates by using Neosporin. He states that he continually cleans the wound and attempts to keep it dry. Mr. Asher Rodriguez is here in follow up regarding his left ankle hardware removal  He is doing rather well. He is having 1/10 pain. He denies chest pain, SOB, calf pain, or fever. Pt states he is still having a small amount of wound drainage but reports no other issues. Medical History  Patient's medications, allergies, past medical, surgical, social and family histories were reviewed and updated as appropriate. Review of Systems   Constitutional: Negative for chills and fever. HENT: Negative for facial swelling and rhinorrhea. Respiratory: Negative for cough and shortness of breath. Cardiovascular: Negative for chest pain. Gastrointestinal: Negative for nausea. Skin: Positive for wound. Negative for pallor and rash. Neurological: Negative for facial asymmetry and speech difficulty. Psychiatric/Behavioral: Negative for agitation and confusion.                                                Examination:  General Exam:  Vitals: Pulse 79   Temp 97.2 °F (36.2 °C) (Temporal)   Resp 16   Ht 6' (1.829 m)   Wt 245 lb (111.1 kg)   SpO2 97%   BMI 33.23 kg/m²    Physical Exam  Constitutional:       General: He is not in acute distress. Appearance: Normal appearance. He is obese. HENT:      Head: Normocephalic and atraumatic. Nose: No rhinorrhea. Mouth/Throat:      Pharynx: No oropharyngeal exudate or posterior oropharyngeal erythema. Eyes:      General: No scleral icterus. Right eye: No discharge. Left eye: No discharge. Pulmonary:      Effort: Pulmonary effort is normal. No respiratory distress. Breath sounds: No stridor. Musculoskeletal:      Cervical back: Normal range of motion. Left ankle: Laceration present. No swelling, deformity or ecchymosis. No tenderness. Normal range of motion. Anterior drawer test negative. Normal pulse. Comments: Left ankle exam: Patient incisional wound shows significant interval healing compared to prior exam.  No drainage at time of exam.  Approximately 1.5 cm wide continued healing wound with visible granulation tissue and eschar. Surrounding tissues negative for erythema, temperature change, fluctuance. Patient bilateral lower extremities contain visible varicosities. Negative Homans' sign. Patient has full active range of motion and strength in the left ankle. Skin:     General: Skin is warm and dry. Capillary Refill: Capillary refill takes less than 2 seconds. Coloration: Skin is not jaundiced. Neurological:      General: No focal deficit present. Mental Status: He is alert and oriented to person, place, and time. Psychiatric:         Mood and Affect: Mood normal.         Behavior: Behavior normal.              Diagnostic testing:  X-rays reviewed in office, I independently reviewed the films in the office today:     No new x-rays taken at this visit. Office Procedures:  No orders of the defined types were placed in this encounter. Assessment and Plan  1.   Status post left ankle hardware removal DOS 1/19/2022  -Patient encouraged that his wound shows great progress from his prior exam.  I encouraged him to continue cleaning his wound in the same manner he has been doing in the past 2 weeks. I also informed him that he can continue to use Neosporin for topical antibiotic and pain relief.  -Patient scheduled for a follow-up in 4 weeks but informed that if anything changes in the negative direction of his continued healing that he should call the office to immediately be placed on antibiotics and reassessed.  -I encouraged the patient to continue his reduction of smoking towards eventual complete ceasing of smoking. We also spent some time discussing the wound healing process how smoking can contribute to delayed wound healing.         Electronically signed by Portia Kahn PA-C on 3/1/2022 at 9:51 AM

## 2022-03-01 NOTE — PATIENT INSTRUCTIONS
Continue to weight bear as tolerated  Continue range of motion  Ice and elevate as needed  Tylenol or Motrin for pain  Follow up in 4 weeks

## 2022-03-01 NOTE — PROGRESS NOTES
Date of surgery:   January 19th     History:  Mr. Kasia Araiza is here in follow up regarding his left ankle hardware removal  He is doing rather well. He is having 1/10 pain. He denies chest pain, SOB, calf pain, or fever. Pt states he is still having a small amount of wound drainage but reports no other issues.

## 2022-03-31 ENCOUNTER — OFFICE VISIT (OUTPATIENT)
Dept: ORTHOPEDIC SURGERY | Age: 52
End: 2022-03-31

## 2022-03-31 VITALS
HEART RATE: 88 BPM | WEIGHT: 245 LBS | BODY MASS INDEX: 33.18 KG/M2 | HEIGHT: 72 IN | RESPIRATION RATE: 16 BRPM | OXYGEN SATURATION: 98 %

## 2022-03-31 DIAGNOSIS — Z98.890 STATUS POST HARDWARE REMOVAL: Primary | ICD-10-CM

## 2022-03-31 PROCEDURE — 99024 POSTOP FOLLOW-UP VISIT: CPT

## 2022-03-31 NOTE — LETTER
[address] [facility]  [address] [address]  Phone: [phone]  Fax: [phone]    Melonie Schmitt        March 31, 2022     Patient: Carleen Munguia   YOB: 1970   Date of Visit: 3/31/2022       To Whom It May Concern: It is my medical opinion that Carleen Munguia may return to work with the following restrictions: lifting/carrying not to exceed 10 lbs. , pushing/pulling not to exceed 10 lbs, no ladders, no stairs, no ramp, avoid excessive walking or standing until cleared by physician. If you have any questions or concerns, please don't hesitate to call.     Sincerely,        Charlotte Wise PA-C

## 2022-03-31 NOTE — PROGRESS NOTES
Date of surgery:   January 19th     History:  Mr. Rafael Cordova is here in follow up regarding his left ankle hardware removal  He is doing rather well. He is having 0/10 pain. He denies chest pain, SOB, calf pain,fever. he states that he is still having slight wound dreainage. Pt states that he only notices the drainage on the bandage when he removes the dressing. He states that it is much clearer than before. No other issues.

## 2022-03-31 NOTE — PROGRESS NOTES
3/31/2022   Chief Complaint   Patient presents with    Post-Op Check     left ankle hardware removal        History of Present Illness:                             Quinn Lu is a 54 y.o. male returning to the office today for continued wound management of his left ankle hardware removal, DOS 1/19/2022. Patient states that he feels his wound is continuing to heal, although, he fully understands this is a very slow process due to his complicating venous stasis and continued smoking. Patient states he has been able to cut back on the amount that he smokes but that he has not reached full cessation at this time. As for his hardware removal wound he states that he still has clear drainage that sleeps down onto his sock but that he takes great effort to keep it clean and when he is not working he leaves it to open air to dry. Patient denies any pain associated with his wound or pain radiating to his ankle or foot. Patient denies any signs of infection such as purulence, temperature changes, or surrounding erythema around the wound site. Mr. Quinn Lu is here in follow up regarding his left ankle hardware removal  He is doing rather well. He is having 0/10 pain. He denies chest pain, SOB, calf pain,fever. he states that he is still having slight wound dreainage. Pt states that he only notices the drainage on the bandage when he removes the dressing. He states that it is much clearer than before. No other issues. Medical History  Patient's medications, allergies, past medical, surgical, social and family histories were reviewed and updated as appropriate. Review of Systems   Constitutional: Negative for fever. HENT: Negative for facial swelling and rhinorrhea. Respiratory: Negative for cough and shortness of breath. Cardiovascular: Negative for chest pain. Gastrointestinal: Negative for nausea.    Musculoskeletal: Negative for arthralgias, back pain, gait problem, joint swelling, myalgias, neck pain and neck stiffness. Skin: Positive for wound. Negative for color change, pallor and rash. Neurological: Negative for facial asymmetry and speech difficulty. Psychiatric/Behavioral: Negative for agitation and confusion. Examination:  General Exam:  Vitals: Pulse 88   Resp 16   Ht 6' (1.829 m)   Wt 245 lb (111.1 kg)   SpO2 98%   BMI 33.23 kg/m²    Physical Exam  Constitutional:       General: He is not in acute distress. Appearance: Normal appearance. He is obese. He is not ill-appearing or diaphoretic. HENT:      Nose: No rhinorrhea. Eyes:      General: No scleral icterus. Right eye: No discharge. Left eye: No discharge. Pulmonary:      Effort: Pulmonary effort is normal. No respiratory distress. Breath sounds: No stridor. Musculoskeletal:      Cervical back: Normal range of motion. Left ankle: Laceration present. No swelling, deformity or ecchymosis. No tenderness. Normal range of motion. Anterior drawer test negative. Normal pulse. Comments: Left ankle exam: Patient left ankle appears in a continuous wound healing state, improved since prior exam.  Patient wound shows Approximately 1 cm wide continued healing wound with visible granulation tissue and eschar. Surrounding tissues negative for erythema or temperature change. Clear fluid appears when wound edges are pressed together without signs of purulence. Patient bilateral lower extremities contain visible varicosities. Negative Homans' sign. Patient has full active range of motion and strength in the left ankle. Skin:     General: Skin is warm and dry. Capillary Refill: Capillary refill takes less than 2 seconds. Coloration: Skin is not jaundiced or pale. Neurological:      General: No focal deficit present. Mental Status: He is alert and oriented to person, place, and time.    Psychiatric:         Mood and Affect: Mood normal.         Behavior: Behavior normal.         Diagnostic testing:  X-rays reviewed in office, I independently reviewed the films in the office today:     No images taken at visit today. Office Procedures:  No orders of the defined types were placed in this encounter. Assessment and Plan  1. Left ankle hardware removal, DOS 1/19/2022.  -Explained to the patient that his wound continues to look better with each exam but that the continued healing process is advancing at a slower pace than what would like to see. For this reason I explained to him that he should see Dr. Evone Sacks at his next visit to discuss the next steps forward. I explained to him that it may be time for him to consider treatment with a wound .  I also had a discussion with the patient about smoking cessation and how that may help his wound heal better and reduce oxidative stress.  -In the meantime between this appointment and his next appointment in 4 weeks with Dr. Evone Sacks the patient should continue utilizing the wound care treatment plan that he has found some mild to moderate success with. This includes keeping his wound dressed while at work and keeping it clean and open to air when he is able to have a protected environment at home.  -Patient should remain on current work restrictions until his wound is completely closed as a work boot would jeopardize the healing process.           Electronically signed by Katina Martinez PA-C on 3/31/2022 at 10:04 AM

## 2022-03-31 NOTE — PATIENT INSTRUCTIONS
Continue current work restrictions  May take Ibuprofen or Motrin as needed  Rest, ice, and elevate as needed  Work on ROM and strengthening exercises as discussed  Follow up in 4 weeks with Dr. Esteban Metzger

## 2022-04-03 ASSESSMENT — ENCOUNTER SYMPTOMS
RHINORRHEA: 0
COUGH: 0
NAUSEA: 0
COLOR CHANGE: 0
SHORTNESS OF BREATH: 0
FACIAL SWELLING: 0
BACK PAIN: 0

## 2022-04-28 ENCOUNTER — OFFICE VISIT (OUTPATIENT)
Dept: ORTHOPEDIC SURGERY | Age: 52
End: 2022-04-28
Payer: COMMERCIAL

## 2022-04-28 VITALS
WEIGHT: 251.2 LBS | DIASTOLIC BLOOD PRESSURE: 109 MMHG | HEIGHT: 72 IN | BODY MASS INDEX: 34.02 KG/M2 | SYSTOLIC BLOOD PRESSURE: 185 MMHG | RESPIRATION RATE: 17 BRPM

## 2022-04-28 DIAGNOSIS — S81.802A WOUND OF LEFT LEG, INITIAL ENCOUNTER: Primary | ICD-10-CM

## 2022-04-28 PROCEDURE — 99212 OFFICE O/P EST SF 10 MIN: CPT | Performed by: ORTHOPAEDIC SURGERY

## 2022-04-28 NOTE — PROGRESS NOTES
4/28/2022   Chief Complaint   Patient presents with    Post-Op Check     Left ankle hardware removal        History of Present Illness:                             Tracy Gallo is a 54 y.o. male who returns today for follow-up of his left ankle. He feels like he is getting around okay but has had no significant progress in the healing of his wound. He has been doing local wound care at home. He denies any fevers or chills or purulent drainage. Patient returns to the office with a post op left hardware removal DOS: 1/19/22. Pt stated that he has no pain today. Pt's wound is not fully closed but the pt stated he does not have any fever or chills or recall any recently. Pt stated that he has not been taking any pan medication. Pt stated he is able to move around without pain and has good ROM. Medical History  Patient's medications, allergies, past medical, surgical, social and family histories were reviewed and updated as appropriate. Review of Systems   Constitutional: Negative for activity change and fever. Respiratory: Negative for chest tightness and shortness of breath. Cardiovascular: Negative for chest pain. Skin: Negative for color change. Neurological: Negative for dizziness. Psychiatric/Behavioral: The patient is not nervous/anxious. Examination:  General Exam:  Vitals: BP (!) 185/109   Resp 17   Ht 6' (1.829 m)   Wt 251 lb 3.2 oz (113.9 kg)   BMI 34.07 kg/m²    Physical Exam       Left lower extremity:  There is a open wound at the lateral aspect of the ankle in the area of his previous hardware removal.  There is healthy-appearing granulation tissue present at the base of the wound with no exposed bone or tendon. There some areas of fibrinous tissue but no evidence of necrosis. The wound extends approximately 7 cm in length and 2 cm in width.   There is mild reactive erythema present in the soft tissues    Office Procedures:  Orders Placed This Encounter   Procedures   [address], Toño Leslie NP, Wound Care, [place]     Referral Priority:   Routine     Referral Type:   Eval and Treat     Referral Reason:   Specialty Services Required     Referred to Provider:   KISHA Rodríguez CNP     Requested Specialty:   Nurse Practitioner     Number of Visits Requested:   1       Assessment and Plan  1. Left ankle slowly healing wound. 2. Status post left ankle fracture and ORIF and subsequent hardware removal.    His wound is showing very slow healing. There is no evidence of active infection that would require any urgent surgical intervention at this time. I have recommended referral to the wound care center. We sent the referral today. I explained the patient that he has no surgical issues at this time based on my interpretation of his healing process. We discussed potential for debridements or more advanced procedures if he is having continued failure of healing of his wound. I have recommended that the wound care center take over his treatment at this stage. They will have him come back and see me as needed if we need to discuss any surgical interventions.         Electronically signed by Airam Weaver MD on 4/28/2022 at 5:07 PM

## 2022-04-28 NOTE — PROGRESS NOTES
Patient returns to the office with a post op left hardware removal DOS: 1/19/22. Pt stated that he has no pain today. Pt's wound is not fully closed but the pt stated he does not have any fever or chills or recall any recently. Pt stated that he has not been taking any pan medication. Pt stated he is able to move around without pain and has good ROM.

## 2022-04-28 NOTE — PATIENT INSTRUCTIONS
Continue weight-bearing as tolerated. Continue range of motion exercises as instructed. Ice and elevate as needed. Tylenol or Motrin for pain. Follow up with wound care. Follow up here as needed. Stay on restrictions at work for another 2 months.     [address], NP  [address]  [phone]

## 2022-04-28 NOTE — LETTER
[address] [facility]  [address] [facility]  Phone: [phone]  Fax: [phone]    Josh Mariano MD        April 28, 2022     Patient: Rasta Dale   YOB: 1970   Date of Visit: 4/28/2022       To Whom It May Concern: It is my medical opinion that Rasta Dale is to continue restrictions for another two months. If you have any questions or concerns, please don't hesitate to call.     Sincerely,        Josh Mariano MD

## 2022-04-29 ASSESSMENT — ENCOUNTER SYMPTOMS
COLOR CHANGE: 0
CHEST TIGHTNESS: 0
SHORTNESS OF BREATH: 0

## 2022-05-10 ENCOUNTER — HOSPITAL ENCOUNTER (OUTPATIENT)
Dept: WOUND CARE | Age: 52
Discharge: HOME | End: 2022-05-10
Payer: COMMERCIAL

## 2022-05-10 VITALS
HEART RATE: 99 BPM | TEMPERATURE: 97.6 F | RESPIRATION RATE: 18 BRPM | SYSTOLIC BLOOD PRESSURE: 146 MMHG | DIASTOLIC BLOOD PRESSURE: 92 MMHG

## 2022-05-10 DIAGNOSIS — L97.322 SKIN ULCER OF LEFT ANKLE WITH FAT LAYER EXPOSED (HCC): ICD-10-CM

## 2022-05-10 DIAGNOSIS — T14.8XXA NONHEALING NONSURGICAL WOUND WITH FAT LAYER EXPOSED: ICD-10-CM

## 2022-05-10 PROBLEM — L97.302: Status: ACTIVE | Noted: 2022-05-10

## 2022-05-10 PROCEDURE — 11042 DBRDMT SUBQ TIS 1ST 20SQCM/<: CPT | Performed by: NURSE PRACTITIONER

## 2022-05-10 PROCEDURE — 99213 OFFICE O/P EST LOW 20 MIN: CPT

## 2022-05-10 PROCEDURE — 87075 CULTR BACTERIA EXCEPT BLOOD: CPT

## 2022-05-10 PROCEDURE — 87070 CULTURE OTHR SPECIMN AEROBIC: CPT

## 2022-05-10 PROCEDURE — 99203 OFFICE O/P NEW LOW 30 MIN: CPT | Performed by: NURSE PRACTITIONER

## 2022-05-10 PROCEDURE — 11719 TRIM NAIL(S) ANY NUMBER: CPT | Performed by: NURSE PRACTITIONER

## 2022-05-10 PROCEDURE — 11042 DBRDMT SUBQ TIS 1ST 20SQCM/<: CPT

## 2022-05-10 NOTE — PROGRESS NOTES
[address] [facility] Initial Visit With Procedure    Referred by: Dr. Bo Esparza of [facility]      Radha Loja is a 54 y.o. 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 The condition is of moderate severity. The ulcer has been present since 1/19/22 post left ankle hardware removal (related to pain) per Dr. John Kelley. He initially had left ankle open reduction internal fixation left ankle per Dr. John Kelley 7/30/21. The underlying cause is thought to be nonhealing surgical.  The patients care to date has included evaluation and follow per Dr. Lexi Licona. The patient has significant underlying medical conditions as below. Wound Pain Timing/Severity: intermittent, mild  Quality of pain: dull, tender  Severity of pain:  2 / 10   Modifying Factors: edema, venous stasis, obesity and smoking  Associated Signs/Symptoms: edema, erythema, drainage and pain    KERI: Right 0.56, left 0.5 as of 5/10/22    Arterial evaluation: based on wound; locations, assessment and healing progress      Venous Evaluation: based on wound; locations, assessment and healing progress    Wound infection: wound culture obtained 5/10/22    Diabetes: No  Smoking: Yes (approximately 1 pack per day). Patient counseled in length on smoking cessation including benefits of quitting and health risks of continuing to smoke including but not limited to lung cancer, COPD, risk of coronary artery disease. Patient verbalized understanding today, is not ready to quit. Anticoagulant/Antiplatelet therapy: Low dose asprin  Immunosuppression: No  Obesity: Yes  Nutritional status:  well nourished. Discussed need for increased protein and calories for wound healing and good sources of protein (just over 7 grams for every 20 pounds of body weight).  Animal-based foods high in protein (meat, poultry, fish, eggs, and dairy foods). Plant based foods high in protein (tofu, lentils, beans, chickpeas, nuts, quinoa and abeba seeds. Other History:    Patient educated on the 6 essential components necessary for wound healing: Circulation, Debridements, Proper Dressings and Topical Wound Products, Infection Control, Edema Control and Offloading. Patient educated on those factors that negatively effect or impact wound healing: smoking, obesity, uncontrolled diabetes, anticoagulant and immunosuppressive regimens, inadequate nutrition, untreated arterial and venous disease if applicable and measures to manage edema.           PAST MEDICAL HISTORY        Diagnosis Date    Fracture     in ER 7/24/2021 fx left ankle- for surg 7/30/2021    History of alcohol abuse     Pericarditis     \"in 2015\" no longer forllow with cardiologist    Sleep apnea     had sleep study 2014- uses cpap       PAST SURGICAL HISTORY    Past Surgical History:   Procedure Laterality Date    ANKLE FRACTURE SURGERY Left 7/30/2021    LEFT ANKLE OPEN REDUCTION INTERNAL FIXATION performed by Nataly Shaw MD at [address] [place] [address] Left 1/19/2022    LEFT ANKLE HARDWARE REMOVAL performed by Nataly Shaw MD at [facility]    Family History   Problem Relation Age of Onset    Breast Cancer Mother     Cancer Father         lung cancer       SOCIAL HISTORY    Social History     Tobacco Use    Smoking status: Current Every Day Smoker     Packs/day: 1.50     Years: 25.00     Pack years: 37.50     Types: Cigarettes    Smokeless tobacco: Never Used    Tobacco comment: instructed adverse effects   Vaping Use    Vaping Use: Never used   Substance Use Topics    Alcohol use: Yes     Comment: average\" 5-6 beers per day\"    Drug use: Not Currently     Types: Marijuana Juanis Dinning)     Comment: \"20+ yrs ago \"       ALLERGIES    No Known Allergies    MEDICATIONS    Current Outpatient Medications on File Prior to Encounter   Medication Sig Dispense Refill    aspirin 325 MG EC tablet Take 1 tablet by mouth daily 30 tablet 0     No current facility-administered medications on file prior to encounter.        PROBLEM LIST    Patient Active Problem List   Diagnosis    Bimalleolar fracture of left ankle, closed, initial encounter    S/P ORIF (open reduction internal fixation) fracture    Pain due to internal orthopedic prosthetic devices, implants and grafts, initial encounter ([facility]    WD-Nonhealing nonsurgical wound with fat layer exposed    WD-Skin ulcer of ankle with fat layer exposed ([facility]       REVIEW OF SYSTEMS    Constitutional: negative for anorexia, chills, fatigue, fevers, malaise, sweats and weight loss  Respiratory: negative for cough, dyspnea on exertion, hemoptysis, pleurisy/chest pain, shortness of breath, sputum, stridor and wheezing  Cardiovascular: positive for lower extremity edema, negative for chest pain, chest pressure/discomfort, claudication, dyspnea, exertional chest pressure/discomfort, fatigue, near-syncope, orthopnea, palpitations, paroxysmal nocturnal dyspnea, syncope and tachypnea  Integument/breast: positive for skin lesion(s)      Objective:      BP (!) 146/92   Pulse 99   Temp 97.6 °F (36.4 °C) (Tympanic)   Resp 18     PHYSICAL EXAM  General Appearance: alert and oriented to person, place and time, well-developed and well-nourished, in no acute distress  Pulmonary/Chest: clear to auscultation bilaterally- no wheezes, rales or rhonchi, normal air movement, no respiratory distress  Cardiovascular: normal rate, normal S1 and S2, no gallops and intact distal pulses  Extremities: no cyanosis, no clubbing, Estela's sign negative bilaterally, 1-2 + edema-  bilateral lower legs and varicose veins noted-  bilateral lower legs  Dermatologic exam: Visual inspection of the periwound reveals the skin to be edematous  Wound exam: see wound description below in procedure note      Assessment:       Lior Porter  appears to have a non-healing wound of the left lateral ankle. The etiology of the wound is felt to be non-healing surgical. There are multiple complicating factors including edema, venous stasis, obesity and smoking. A comprehensive wound management program would be helpful to heal this wound. Assessments completed include fall risk and nutritional, functional,and psychological status. At this time appropriate care would include: periodic debridement and wound care as below. Problem List Items Addressed This Visit        Other    WD-Nonhealing nonsurgical wound with fat layer exposed    Relevant Orders    Culture, Wound    WD-Skin ulcer of ankle with fat layer exposed ([address]    Relevant Orders    Culture, Wound            Procedure Note    Indications:  Based on my examination of this patient's wound(s) today, sharp excision into necrotic subcutaneous tissue is required to promote healing and evaluate the extent of previous healing. Performed by: KISHA Patel CNP    Consent obtained: Yes    Time out taken:  Yes    Pain Control: Anesthetic  Anesthetic: 5% Lidocaine Ointment Topical     Debridement:Excisional Debridement    Using curette the wound(s) was/were sharply debrided down through and including the removal of subcutaneous tissue.         Devitalized Tissue Debrided:  slough and exudate    Pre Debridement Measurements:  Are located in the Wound Documentation Flow Sheet    All active wounds listed below with today's date are evaluated  Wound(s)    debrided this date include # : 1     Post  Debridement Measurements:  Wound 05/10/22, #1 Pretibial Left;Lateral (Active)   Wound Image   05/10/22 0943   Wound Etiology Non-Healing Surgical 05/10/22 0943   Dressing Status New dressing applied 05/10/22 1022   Wound Cleansed Wound cleanser 05/10/22 0943   Offloading for Diabetic Foot Ulcers Offloading not ordered 05/10/22 1022   Wound Length (cm) 6.5 cm 05/10/22 0943   Wound Width (cm) 1.5 cm 05/10/22 0943   Wound Depth (cm) 0.2 cm 05/10/22 9233   Wound Surface Area (cm^2) 9.75 cm^2 05/10/22 0943   Wound Volume (cm^3) 1.95 cm^3 05/10/22 0943   Post-Procedure Length (cm) 6.5 cm 05/10/22 1006   Post-Procedure Width (cm) 1.5 cm 05/10/22 1006   Post-Procedure Depth (cm) 0.2 cm 05/10/22 1006   Post-Procedure Surface Area (cm^2) 9.75 cm^2 05/10/22 1006   Post-Procedure Volume (cm^3) 1.95 cm^3 05/10/22 1006   Distance Tunneling (cm) 0 cm 05/10/22 0943   Tunneling Position ___ O'Clock 0 05/10/22 0943   Undermining Starts ___ O'Clock 0 05/10/22 0943   Undermining Ends___ O'Clock 0 05/10/22 0943   Undermining Maxium Distance (cm) 0 05/10/22 0943   Wound Assessment Pink/red 05/10/22 0943   Drainage Amount Moderate 05/10/22 0943   Drainage Description Serosanguinous; Yellow 05/10/22 0943   Odor None 05/10/22 0943   Reyna-wound Assessment Intact 05/10/22 0943   Margins Defined edges 05/10/22 0943   Wound Thickness Description not for Pressure Injury Full thickness 05/10/22 0943   Number of days: 0       Percent of Wound(s) Debrided: 100%    Total  Area  Debrided:  9.75 sq cm     Bleeding:  Minimal    Hemostasis Achieved:  by pressure    Procedural Pain:  0  / 10     Post Procedural Pain:  0 / 10     Response to treatment:  Well tolerated by patient. The wound was cultured, will apply for the wound vac to get this wound healed as quickly as possible. In the meantime, will use Gent topically to minimize bio-burden and collagen to build tissue and compression wrap. Keep skin clean and dry. Discussed local wound care and signs of infection. The patient's toe nails were trimmed in length, totaling 10 nails using tissue nippers. Medical necessity includes advanced atrophic changes to include decrease hair growth lower legs, thickening, discolored toenails,rubor, burning, edema and pain for L60.2. He has no primary care provider, but was seen by Dr. Dallin Kaba 4/28/22.     Plan:     Discharge Instructions       PHYSICIAN ORDERS AND DISCHARGE INSTRUCTIONS    NOTE: Upon discharge from the [facility],[address], you will receive a patient experience survey. We would be grateful if you would take the time to fill this survey out. Wound care order history:     KERI's   Right  0.56     Left 0.5   Date: 5/10/22   Cultures:      Grafts:     Antibiotics:        Continuing wound care orders and information:                Residence:                Continue home health care with:    Your wound-care supplies will be provided by: Wound cleansing:     Do not scrub or use excessive force. Wash hands with soap and water before and after dressing changes. Prior to applying a clean dressing, cleanse wound with normal saline, wound cleanser, or mild soap and water. Ask the physician or nurse before getting the wound(s) wet in a shower    Daily Wound management:   Keep weight off wounds and reposition every 2 hours. Avoid standing for long periods of time. Apply wraps/stockings in AM and remove at bedtime. If swelling is present, elevate legs to the level of the heart or above for 30 minutes 4-5 times a day and/or when sitting. When taking antibiotics take entire prescription as ordered by physician do not stop taking until medicine is all gone. Orders for this week: 5/10/22    Culture of Left Lateral Ankle taken on 5/10/22. Rx: [facility] in [place]    Left Ankle - Wash with soap and water, pat dry. Apply Gentamicin and Stimulen to wound bed. Cover with ca alginate and Sorbex. Wrap with Coban 2 Lite and leave in place until nurse visit on Friday. (((Do not increase Coban 2 Lite to full strength due to low ABIs)))    Paint toenails with Betadine today in clinic 5/10/22, due to trimming nails. Applied for wound vac on 5/10/22, once available start applying in clinic. Nurse visits on Fridays. To check and reapply Coban 2 Lite, then to change Wound Vac when available. Follow up with Jordon Kapoor CNP in 1 week in the wound care center.   Call [phone] for any questions or concerns.   Date__________   Time____________          Treatment Note Wound 05/10/22 Pretibial Left;Lateral-Dressing/Treatment:  (gentamicin, stimulen, ca alg, sorbex, coban 2lite.)    Written Patient Dismissal Instructions Given          Electronically signed by KISHA Kolb CNP on 5/10/2022 at 1:16 PM

## 2022-05-10 NOTE — PROGRESS NOTES
Multilayer Compression Wrap   (Not Unna) Below the Knee    NAME:  Peña Haynes  YOB: 1970  MEDICAL RECORD NUMBER:  [id number]  DATE:  5/10/2022    Multilayer compression wrap: Removed old Multilayer wrap if indicated and wash leg with mild soap/water. Applied moisturizing agent to dry skin as needed. Applied primary and secondary dressing as ordered. Applied multilayered dressing below the knee to right lower leg. Applied multilayered dressing below the knee to left lower leg. Instructed patient/caregiver not to remove dressing and to keep it clean and dry. Instructed patient/caregiver on complications to report to provider, such as pain, numbness in toes, heavy drainage, and slippage of dressing. Instructed patient on purpose of compression dressing and on activity and exercise recommendations.       Electronically signed by Amarilis Nunn LPN on 3/58/8829 at 69:52 AM

## 2022-05-13 ENCOUNTER — HOSPITAL ENCOUNTER (OUTPATIENT)
Dept: WOUND CARE | Age: 52
Discharge: HOME | End: 2022-05-13
Payer: COMMERCIAL

## 2022-05-13 VITALS
RESPIRATION RATE: 20 BRPM | DIASTOLIC BLOOD PRESSURE: 91 MMHG | SYSTOLIC BLOOD PRESSURE: 162 MMHG | HEART RATE: 85 BPM | TEMPERATURE: 97.6 F

## 2022-05-13 LAB
CULTURE: NORMAL
Lab: NORMAL
SPECIMEN: NORMAL

## 2022-05-13 PROCEDURE — 29581 APPL MULTLAYER CMPRN SYS LEG: CPT

## 2022-05-13 ASSESSMENT — PAIN - FUNCTIONAL ASSESSMENT: PAIN_FUNCTIONAL_ASSESSMENT: ACTIVITIES ARE NOT PREVENTED

## 2022-05-13 ASSESSMENT — PAIN DESCRIPTION - PAIN TYPE: TYPE: ACUTE PAIN

## 2022-05-13 NOTE — PROGRESS NOTES
Multilayer Compression Wrap   (Not Unna) Below the Knee    NAME:  Carleen Munguia  YOB: 1970  MEDICAL RECORD NUMBER:  [id number]  DATE:  5/13/2022    Multilayer compression wrap: Removed old Multilayer wrap if indicated and wash leg with mild soap/water. Applied moisturizing agent to dry skin as needed. Applied primary and secondary dressing as ordered. Applied multilayered dressing below the knee to left lower leg. Instructed patient/caregiver not to remove dressing and to keep it clean and dry. Instructed patient/caregiver on complications to report to provider, such as pain, numbness in toes, heavy drainage, and slippage of dressing. Instructed patient on purpose of compression dressing and on activity and exercise recommendations.       Electronically signed by Tristan Glass RN on 5/13/2022 at 9:41 AM

## 2022-05-17 ENCOUNTER — HOSPITAL ENCOUNTER (OUTPATIENT)
Dept: WOUND CARE | Age: 52
Discharge: HOME | End: 2022-05-17
Payer: COMMERCIAL

## 2022-05-17 VITALS — TEMPERATURE: 98.1 F | DIASTOLIC BLOOD PRESSURE: 78 MMHG | SYSTOLIC BLOOD PRESSURE: 166 MMHG | RESPIRATION RATE: 18 BRPM

## 2022-05-17 DIAGNOSIS — L97.322 SKIN ULCER OF LEFT ANKLE WITH FAT LAYER EXPOSED (HCC): ICD-10-CM

## 2022-05-17 DIAGNOSIS — T14.8XXA NONHEALING NONSURGICAL WOUND WITH FAT LAYER EXPOSED: Primary | ICD-10-CM

## 2022-05-17 PROCEDURE — 11042 DBRDMT SUBQ TIS 1ST 20SQCM/<: CPT

## 2022-05-17 PROCEDURE — 11042 DBRDMT SUBQ TIS 1ST 20SQCM/<: CPT | Performed by: NURSE PRACTITIONER

## 2022-05-17 RX ORDER — BACITRACIN ZINC AND POLYMYXIN B SULFATE 500; 1000 [USP'U]/G; [USP'U]/G
OINTMENT TOPICAL ONCE
Status: CANCELLED | OUTPATIENT
Start: 2022-05-17 | End: 2022-05-17

## 2022-05-17 RX ORDER — LIDOCAINE 50 MG/G
OINTMENT TOPICAL ONCE
Status: CANCELLED | OUTPATIENT
Start: 2022-05-17 | End: 2022-05-17

## 2022-05-17 RX ORDER — BETAMETHASONE DIPROPIONATE 0.05 %
OINTMENT (GRAM) TOPICAL ONCE
Status: CANCELLED | OUTPATIENT
Start: 2022-05-17 | End: 2022-05-17

## 2022-05-17 RX ORDER — GENTAMICIN SULFATE 1 MG/G
OINTMENT TOPICAL ONCE
Status: CANCELLED | OUTPATIENT
Start: 2022-05-17 | End: 2022-05-17

## 2022-05-17 RX ORDER — BACITRACIN, NEOMYCIN, POLYMYXIN B 400; 3.5; 5 [USP'U]/G; MG/G; [USP'U]/G
OINTMENT TOPICAL ONCE
Status: CANCELLED | OUTPATIENT
Start: 2022-05-17 | End: 2022-05-17

## 2022-05-17 RX ORDER — CLOBETASOL PROPIONATE 0.5 MG/G
OINTMENT TOPICAL ONCE
Status: CANCELLED | OUTPATIENT
Start: 2022-05-17 | End: 2022-05-17

## 2022-05-17 RX ORDER — GINSENG 100 MG
CAPSULE ORAL ONCE
Status: CANCELLED | OUTPATIENT
Start: 2022-05-17 | End: 2022-05-17

## 2022-05-17 RX ORDER — LIDOCAINE HYDROCHLORIDE 40 MG/ML
SOLUTION TOPICAL ONCE
Status: CANCELLED | OUTPATIENT
Start: 2022-05-17 | End: 2022-05-17

## 2022-05-17 RX ORDER — LIDOCAINE 40 MG/G
CREAM TOPICAL ONCE
Status: CANCELLED | OUTPATIENT
Start: 2022-05-17 | End: 2022-05-17

## 2022-05-17 NOTE — PROGRESS NOTES
Multilayer Compression Wrap   (Not Unna) Below the Knee    NAME:  Nat Gordon  YOB: 1970  MEDICAL RECORD NUMBER:  [id number]  DATE:  5/17/2022    Multilayer compression wrap: Removed old Multilayer wrap if indicated and wash leg with mild soap/water. Applied moisturizing agent to dry skin as needed. Applied primary and secondary dressing as ordered. Applied multilayered dressing below the knee to left lower leg. Instructed patient/caregiver not to remove dressing and to keep it clean and dry. Instructed patient/caregiver on complications to report to provider, such as pain, numbness in toes, heavy drainage, and slippage of dressing. Instructed patient on purpose of compression dressing and on activity and exercise recommendations.       Electronically signed by Cem Menjivar RN on 5/17/2022 at 9:41 AM

## 2022-05-17 NOTE — PROGRESS NOTES
Wound Care Center Progress Note With Procedure    Quinn Lu  AGE: 54 y.o. GENDER: male  : 1970  EPISODE DATE:  2022     Subjective:     Chief Complaint   Patient presents with    Wound Check     left ankle         HISTORY of [facility]   Quinn Lu is a 54 y.o. male who presents to the [address] [place] [address] for evaluation and treatment of Chronic non-healing surgical  ulcer(s) of the left lateral ankle. The condition is of moderate severity. The ulcer has been present since 22 post left ankle hardware removal (related to pain) per Dr. Krystin Busby. He initially had left ankle open reduction internal fixation left ankle per Dr. Krystin Busby 21. The underlying cause is thought to be nonhealing surgical.  The patients care to date has included evaluation and follow per Dr. Reynaldo Rodriguez. The patient has significant underlying medical conditions as below.      Wound Pain Timing/Severity: intermittent, mild  Quality of pain: dull, tender  Severity of pain:  2 / 10   Modifying Factors: edema, venous stasis, obesity and smoking  Associated Signs/Symptoms: edema, erythema, drainage and pain     KERI: Right 0.56, left 0.5 as of 5/10/22     Arterial evaluation: based on wound; locations, assessment and healing progress        Venous Evaluation: based on wound; locations, assessment and healing progress     Wound infection: wound culture obtained 5/10/22     Diabetes: No  Smoking: Yes (approximately 1 pack per day). Patient counseled in length on smoking cessation including benefits of quitting and health risks of continuing to smoke including but not limited to lung cancer, COPD, risk of coronary artery disease. Patient verbalized understanding today, is not ready to quit. Anticoagulant/Antiplatelet therapy: Low dose asprin  Immunosuppression: No  Obesity: Yes  Nutritional status:  well nourished.  Discussed need for increased protein and calories for wound healing and good sources of protein (just over 7 grams for every 20 pounds of body weight). Animal-based foods high in protein (meat, poultry, fish, eggs, and dairy foods). Plant based foods high in protein (tofu, lentils, beans, chickpeas, nuts, quinoa and abeba seeds. Other History:     Patient educated on the 6 essential components necessary for wound healing: Circulation, Debridements, Proper Dressings and Topical Wound Products, Infection Control, Edema Control and Offloading.     Patient educated on those factors that negatively effect or impact wound healing: smoking, obesity, uncontrolled diabetes, anticoagulant and immunosuppressive regimens, inadequate nutrition, untreated arterial and venous disease if applicable and measures to manage edema.           PAST MEDICAL HISTORY        Diagnosis Date    Fracture     in ER 7/24/2021 fx left ankle- for surg 7/30/2021    History of alcohol abuse     Pericarditis     \"in 2015\" no longer forllow with cardiologist    Sleep apnea     had sleep study 2014- uses cpap       PAST SURGICAL HISTORY    Past Surgical History:   Procedure Laterality Date    ANKLE FRACTURE SURGERY Left 7/30/2021    LEFT ANKLE OPEN REDUCTION INTERNAL FIXATION performed by Juan Carlos Hernández MD at [address] [place] [address] Left 1/19/2022    LEFT ANKLE HARDWARE REMOVAL performed by Juan Carlos Hernández MD at [facility]    Family History   Problem Relation Age of Onset    Breast Cancer Mother     Cancer Father         lung cancer       SOCIAL HISTORY    Social History     Tobacco Use    Smoking status: Current Every Day Smoker     Packs/day: 1.50     Years: 25.00     Pack years: 37.50     Types: Cigarettes    Smokeless tobacco: Never Used    Tobacco comment: instructed adverse effects   Vaping Use    Vaping Use: Never used   Substance Use Topics    Alcohol use: Yes     Comment: average\" 5-6 beers per day\"    Drug use: Not Currently     Types: Marijuana Fronie Melonie)     Comment: \"20+ yrs ago \"       ALLERGIES    No Known Allergies    MEDICATIONS    Current Outpatient Medications on File Prior to Encounter   Medication Sig Dispense Refill    aspirin 325 MG EC tablet Take 1 tablet by mouth daily 30 tablet 0     No current facility-administered medications on file prior to encounter. REVIEW OF SYSTEMS    Pertinent items are noted in HPI. Constitutional: Negative for systemic symptoms including fever, chills and malaise. Objective:      BP (!) 166/78   Temp 98.1 °F (36.7 °C) (Temporal)   Resp 18     PHYSICAL EXAM    General: The patient is in no acute distress. Mental status:  Patient is appropriate, is  oriented to place and plan of care. Dermatologic exam: Visual inspection of the periwound reveals the skin to be edematous  Wound exam: see wound description below in procedure note      Assessment:     Problem List Items Addressed This Visit        Other    WD-Nonhealing nonsurgical wound with fat layer exposed - Primary    WD-Skin ulcer of ankle with fat layer exposed ([address]        Procedure Note    Indications:  Based on my examination of this patient's wound(s) today, sharp excision into necrotic subcutaneous tissue is required to promote healing and evaluate the extent of previous healing. Performed by: KISHA Blair - CNP    Consent obtained: Yes    Time out taken:  Yes    Pain Control: Anesthetic  Anesthetic: 5% Lidocaine Ointment Topical     Debridement:Excisional Debridement    Using curette the wound(s) was/were sharply debrided down through and including the removal of subcutaneous tissue.         Devitalized Tissue Debrided:  slough and exudate    Pre Debridement Measurements:  Are located in the Wound Documentation Flow Sheet    All active wounds listed below with today's date are evaluated  Wound(s)    debrided this date include # : 1     Post  Debridement Measurements:  Wound 05/10/22, #1 Pretibial Left;Lateral (Active)   Wound Image   05/10/22 0943   Wound Etiology Non-Healing Surgical 05/17/22 0908   Dressing Status New dressing applied;Clean; Intact;Dry 05/17/22 0933   Wound Cleansed Soap and water 05/17/22 0908   Offloading for Diabetic Foot Ulcers Offloading not required 05/13/22 0923   Wound Length (cm) 7.2 cm 05/17/22 0908   Wound Width (cm) 1.8 cm 05/17/22 0908   Wound Depth (cm) 0.5 cm 05/17/22 0908   Wound Surface Area (cm^2) 12.96 cm^2 05/17/22 0908   Change in Wound Size % (l*w) -32.92 05/17/22 0908   Wound Volume (cm^3) 6.48 cm^3 05/17/22 0908   Wound Healing % -232 05/17/22 0908   Post-Procedure Length (cm) 7.2 cm 05/17/22 0933   Post-Procedure Width (cm) 1.8 cm 05/17/22 0933   Post-Procedure Depth (cm) 0.5 cm 05/17/22 0933   Post-Procedure Surface Area (cm^2) 12.96 cm^2 05/17/22 0933   Post-Procedure Volume (cm^3) 6.48 cm^3 05/17/22 0933   Distance Tunneling (cm) 0 cm 05/17/22 0908   Tunneling Position ___ O'Clock 0 05/17/22 0908   Undermining Starts ___ O'Clock 0 05/17/22 0908   Undermining Ends___ O'Clock 0 05/17/22 0908   Undermining Maxium Distance (cm) 0 05/17/22 0908   Wound Assessment [place]/red;Slough 05/17/22 0908   Drainage Amount Moderate 05/17/22 0908   Drainage Description Serosanguinous 05/17/22 0908   Odor None 05/17/22 0908   Reyna-wound Assessment Intact 05/17/22 0908   Margins Defined edges 05/17/22 0908   Wound Thickness Description not for Pressure Injury Full thickness 05/17/22 0908   Number of days: 7       Percent of Wound(s) Debrided: approximately 100%    Total  Area  Debrided:  12.96 sq cm     Bleeding:  Minimal    Hemostasis Achieved:  by pressure    Procedural Pain:  0  / 10     Post Procedural Pain:  0 / 10     Response to treatment:  Well tolerated by patient. Status of wound progress and description from last visit: Larger today, awaiting approval on the wound vac. In the meantime, will use Gent topically to minimize bio-burden and collagen to build tissue and compression wrap. The patient is back to work.       Plan:       Discharge Instructions low ABIs)))       Applied for wound vac on 5/10/22, once available start applying in clinic.     WOUND VAC THERAPY: Left Lateral Ankle     DUODERM TO PERIWOUND FOR PROTECTION. APPLY Anasept, Stimulen and BLACK FOAM TO WOUND. SECURE VAC DRESSING WITH DRAPE. SET WOUND VAC  CONTINUOUS SUCTION. CANISTER CHANGE WEEKLY OR ACCORDING TO VOLUME OF DRAINAGE. WOUND VAC DRESSING TO BE CHANGED [facility].         Nurse visits on Fridays. To check and reapply Coban 2 Lite, then to change Wound Vac when available. Follow up with Shima Lan CNP in 1 week in the wound care center. Call [phone] for any questions or concerns.   Date__________   Time____________        Treatment Note Wound 05/10/22 Pretibial Left;Lateral-Dressing/Treatment:  (anasept. stimulen, versatile, ca alginate, foam, [id number])    Written Patient Dismissal Instructions Given            Electronically signed by KISHA Blair CNP on 5/17/2022 at 1:27 PM

## 2022-05-20 ENCOUNTER — HOSPITAL ENCOUNTER (OUTPATIENT)
Dept: WOUND CARE | Age: 52
Discharge: HOME | End: 2022-05-20
Payer: COMMERCIAL

## 2022-05-20 VITALS
TEMPERATURE: 97.6 F | RESPIRATION RATE: 18 BRPM | DIASTOLIC BLOOD PRESSURE: 94 MMHG | SYSTOLIC BLOOD PRESSURE: 161 MMHG | HEART RATE: 86 BPM

## 2022-05-20 PROCEDURE — 99213 OFFICE O/P EST LOW 20 MIN: CPT

## 2022-05-20 PROCEDURE — 29581 APPL MULTLAYER CMPRN SYS LEG: CPT

## 2022-05-20 ASSESSMENT — PAIN - FUNCTIONAL ASSESSMENT: PAIN_FUNCTIONAL_ASSESSMENT: ACTIVITIES ARE NOT PREVENTED

## 2022-05-20 NOTE — PROGRESS NOTES
Multilayer Compression Wrap   (Not Unna) Below the Knee    NAME:  Yas Kendall  YOB: 1970  MEDICAL RECORD NUMBER:  [id number]  DATE:  5/20/2022    Multilayer compression wrap: Removed old Multilayer wrap if indicated and wash leg with mild soap/water. Applied moisturizing agent to dry skin as needed. Applied primary and secondary dressing as ordered. Applied multilayered dressing below the knee to left lower leg. Instructed patient/caregiver not to remove dressing and to keep it clean and dry. Instructed patient/caregiver on complications to report to provider, such as pain, numbness in toes, heavy drainage, and slippage of dressing. Instructed patient on purpose of compression dressing and on activity and exercise recommendations.       Electronically signed by Karol Ferreira RN on 5/20/2022 at 9:51 AM

## 2022-05-24 ENCOUNTER — HOSPITAL ENCOUNTER (OUTPATIENT)
Dept: WOUND CARE | Age: 52
Discharge: HOME | End: 2022-05-24
Payer: COMMERCIAL

## 2022-05-24 VITALS
RESPIRATION RATE: 18 BRPM | TEMPERATURE: 97.7 F | SYSTOLIC BLOOD PRESSURE: 136 MMHG | HEART RATE: 77 BPM | DIASTOLIC BLOOD PRESSURE: 72 MMHG

## 2022-05-24 DIAGNOSIS — L97.322 SKIN ULCER OF LEFT ANKLE WITH FAT LAYER EXPOSED (HCC): ICD-10-CM

## 2022-05-24 DIAGNOSIS — T14.8XXA NONHEALING NONSURGICAL WOUND WITH FAT LAYER EXPOSED: Primary | ICD-10-CM

## 2022-05-24 PROCEDURE — 11042 DBRDMT SUBQ TIS 1ST 20SQCM/<: CPT | Performed by: NURSE PRACTITIONER

## 2022-05-24 PROCEDURE — 11042 DBRDMT SUBQ TIS 1ST 20SQCM/<: CPT

## 2022-05-24 RX ORDER — BACITRACIN, NEOMYCIN, POLYMYXIN B 400; 3.5; 5 [USP'U]/G; MG/G; [USP'U]/G
OINTMENT TOPICAL ONCE
Status: CANCELLED | OUTPATIENT
Start: 2022-05-24 | End: 2022-05-24

## 2022-05-24 RX ORDER — LIDOCAINE HYDROCHLORIDE 40 MG/ML
SOLUTION TOPICAL ONCE
Status: CANCELLED | OUTPATIENT
Start: 2022-05-24 | End: 2022-05-24

## 2022-05-24 RX ORDER — BETAMETHASONE DIPROPIONATE 0.05 %
OINTMENT (GRAM) TOPICAL ONCE
Status: CANCELLED | OUTPATIENT
Start: 2022-05-24 | End: 2022-05-24

## 2022-05-24 RX ORDER — CLOBETASOL PROPIONATE 0.5 MG/G
OINTMENT TOPICAL ONCE
Status: CANCELLED | OUTPATIENT
Start: 2022-05-24 | End: 2022-05-24

## 2022-05-24 RX ORDER — LIDOCAINE 50 MG/G
OINTMENT TOPICAL ONCE
Status: CANCELLED | OUTPATIENT
Start: 2022-05-24 | End: 2022-05-24

## 2022-05-24 RX ORDER — LIDOCAINE 40 MG/G
CREAM TOPICAL ONCE
Status: CANCELLED | OUTPATIENT
Start: 2022-05-24 | End: 2022-05-24

## 2022-05-24 RX ORDER — GINSENG 100 MG
CAPSULE ORAL ONCE
Status: CANCELLED | OUTPATIENT
Start: 2022-05-24 | End: 2022-05-24

## 2022-05-24 RX ORDER — GENTAMICIN SULFATE 1 MG/G
OINTMENT TOPICAL ONCE
Status: CANCELLED | OUTPATIENT
Start: 2022-05-24 | End: 2022-05-24

## 2022-05-24 RX ORDER — BACITRACIN ZINC AND POLYMYXIN B SULFATE 500; 1000 [USP'U]/G; [USP'U]/G
OINTMENT TOPICAL ONCE
Status: CANCELLED | OUTPATIENT
Start: 2022-05-24 | End: 2022-05-24

## 2022-05-24 NOTE — PROGRESS NOTES
Wound Care Center Progress Note With Procedure    Enriqueta Bernal  AGE: 54 y.o. GENDER: male  : 1970  EPISODE DATE:  2022     Subjective:     Chief Complaint   Patient presents with    Wound Check     left leg          HISTORY of PRESENT ILLNESS     Camila patel 54 y.o. 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 The condition is of moderate severity. The ulcer has been present since 22 post left ankle hardware removal (related to pain) per Dr. Jadiel Pitt initially had left ankle open reduction internal fixation left ankle per Dr. Robinson Carrillo 21. The underlying cause is thought to be nonhealing surgical.  The patients care to date has included evaluation and follow per Dr. Jailyn Sood. The patient has significant underlying medical conditions as below. Patient reports some improvement but still complains of pain. No NPT device delivered yet. This is the job of home health to order.   Wound clean and dry      Wound Pain Timing/Severity: intermittent, mild  Quality of pain: dull, tender  Severity of pain:  2 / 10   Modifying Factors: edema, venous stasis, obesity and smoking  Associated Signs/Symptoms: edema, erythema, drainage and pain        PAST MEDICAL HISTORY        Diagnosis Date    Fracture     in ER 2021 fx left ankle- for surg 2021    History of alcohol abuse     Pericarditis     \"in 2015\" no longer forllow with cardiologist    Sleep apnea     had sleep study - uses cpap       PAST SURGICAL HISTORY    Past Surgical History:   Procedure Laterality Date    ANKLE FRACTURE SURGERY Left 2021    LEFT ANKLE OPEN REDUCTION INTERNAL FIXATION performed by Airam Weaver MD at [address] [place] [address] 2022    LEFT ANKLE HARDWARE REMOVAL performed by Airam Weaver MD at [address] [place] [address]    Family History   Problem Relation Age of Onset    Breast Cancer Mother     Cancer Father         lung cancer       SOCIAL HISTORY    Social History     Tobacco Use    Smoking status: Current Every Day Smoker     Packs/day: 1.50     Years: 25.00     Pack years: 37.50     Types: Cigarettes    Smokeless tobacco: Never Used    Tobacco comment: instructed adverse effects   Vaping Use    Vaping Use: Never used   Substance Use Topics    Alcohol use: Yes     Comment: average\" 5-6 beers per day\"    Drug use: Not Currently     Types: Marijuana Akiko Awkward)     Comment: \"20+ yrs ago \"       ALLERGIES    No Known Allergies    MEDICATIONS    Current Outpatient Medications on File Prior to Encounter   Medication Sig Dispense Refill    aspirin 325 MG EC tablet Take 1 tablet by mouth daily 30 tablet 0     No current facility-administered medications on file prior to encounter. REVIEW OF SYSTEMS    Pertinent items are noted in HPI. Constitutional: Negative for systemic symptoms including fever, chills and malaise. Objective:      /72   Pulse 77   Temp 97.7 °F (36.5 °C) (Temporal)   Resp 18     PHYSICAL EXAM      General: The patient is in no acute distress. Mental status:  Patient is appropriate, is  oriented to place and plan of care. Dermatologic exam: Visual inspection of the periwound reveals the skin to be normal in turgor and texture and dry  Wound exam: see wound description below in procedure note      Assessment:     Problem List Items Addressed This Visit        Other    WD-Nonhealing nonsurgical wound with fat layer exposed - Primary    WD-Skin ulcer of ankle with fat layer exposed ([address]        Procedure Note    Indications:  Based on my examination of this patient's wound(s) today, sharp excision into necrotic subcutaneous tissue is required to promote healing and evaluate the extent of previous healing.     Performed by: KISHA Shabazz - CNP    Consent obtained: Yes    Time out taken:  Yes    Pain Control: N/A       Debridement:Excisional Debridement    Using curette the wound(s) was/were sharply debrided down through and including the removal of subcutaneous tissue. Devitalized Tissue Debrided:  fibrin, biofilm, slough and exudate    Pre Debridement Measurements:  Are located in the Wound Documentation Flow Sheet    All active wounds listed below with today's date are evaluated  Wound(s)    debrided this date include # : 1     Post  Debridement Measurements:  Wound 05/10/22 Pretibial Left;Lateral (Active)   Wound Image   05/24/22 0920   Wound Etiology Non-Healing Surgical 05/17/22 0908   Dressing Status New dressing applied;Clean;Dry; Intact 05/24/22 0946   Wound Cleansed Soap and water 05/20/22 0948   Offloading for Diabetic Foot Ulcers Offloading not required 05/13/22 0923   Wound Length (cm) 6.6 cm 05/24/22 0920   Wound Width (cm) 1.9 cm 05/24/22 0920   Wound Depth (cm) 0.4 cm 05/24/22 0920   Wound Surface Area (cm^2) 12.54 cm^2 05/24/22 0920   Change in Wound Size % (l*w) -28.62 05/24/22 0920   Wound Volume (cm^3) 5.016 cm^3 05/24/22 0920   Wound Healing % -157 05/24/22 0920   Post-Procedure Length (cm) 6.6 cm 05/24/22 0938   Post-Procedure Width (cm) 1.9 cm 05/24/22 0938   Post-Procedure Depth (cm) 0.4 cm 05/24/22 0938   Post-Procedure Surface Area (cm^2) 12.54 cm^2 05/24/22 0938   Post-Procedure Volume (cm^3) 5.016 cm^3 05/24/22 0938   Distance Tunneling (cm) 0 cm 05/24/22 0920   Tunneling Position ___ O'Clock 0 05/24/22 0920   Undermining Starts ___ O'Clock 0 05/24/22 0920   Undermining Ends___ O'Clock 0 05/24/22 0920   Undermining Maxium Distance (cm) 0 05/24/22 0920   Wound Assessment [facility]/red;Slough 05/24/22 0920   Drainage Amount Moderate 05/24/22 0920   Drainage Description Serosanguinous 05/24/22 0920   Odor None 05/24/22 0920   Reyna-wound Assessment Intact 05/24/22 0920   Margins Defined edges 05/24/22 0920   Wound Thickness Description not for Pressure Injury Full thickness 05/24/22 0920   Number of days: 14       Percent of Wound(s) Debrided: approximately 100%    Total  Area  Debrided:  12.54 sq cm     Bleeding:  Minimal    Hemostasis Achieved:  by pressure    Procedural Pain:  3  / 10     Post Procedural Pain:  0 / 10     Response to treatment:  Well tolerated by patient. Status of wound progress and description from last visit:   Stable. Hope to have NPT device on Friday for RN visit. Then proceed with this       Plan:       Discharge Instructions       PHYSICIAN ORDERS AND DISCHARGE INSTRUCTIONS     NOTE: Upon discharge from the [address], you will receive a patient experience survey. We would be grateful if you would take the time to fill this survey out.     Wound care order history:                 KERI's   Right  0.56     Left 0.5              Date: 5/10/22              Cultures:                 Grafts:                Antibiotics:           Continuing wound care orders and information:                 Residence:                Continue home health care with:               Your wound-care supplies will be provided by:      Wound cleansing:               WS not scrub or use excessive force.              Wash hands with soap and water before and after dressing changes.             TSWIR to applying a clean dressing, cleanse wound with normal saline, wound cleanser, or mild soap and water.               Ask the physician or nurse before getting the wound(s) wet in a shower     Daily Wound management:              Keep weight off wounds and reposition every 2 hours.              Avoid standing for long periods of time.              Apply wraps/stockings in AM and remove at bedtime.              If swelling is present, elevate legs to the level of the heart or above for 30 minutes 4-5 times a day and/or when sitting.                                      When taking antibiotics take entire prescription as ordered by physician do not stop taking until medicine is all gone.                                                           Orders for this week: 5/24/22        Culture of Left Lateral Ankle taken on 5/10/22.        Rx: [facility] in [address] with soap and water, pat dry. Apply Anasept and Stimulen to wound bed. Cover with Versatel, then tuck ca alginate and foam cut to size of wound into opening. Cover with Sorbex. Wrap with Coban 2 Lite and leave in place until nurse visit on Friday. (((Do not increase Coban 2 Lite to full strength due to low ABIs)))        Applied for wound vac on 5/10/22, once available start applying in clinic.     WOUND VAC THERAPY: Left Lateral Ankle      DUODERM TO PERIWOUND FOR PROTECTION. APPLY Anasept, Stimulen and BLACK FOAM TO WOUND. SECURE VAC DRESSING WITH DRAPE.     SET WOUND VAC  CONTINUOUS SUCTION. CANISTER CHANGE WEEKLY OR ACCORDING TO VOLUME OF DRAINAGE.     WOUND VAC DRESSING TO BE CHANGED TUESDAYS AND FRIDAYS IN CLINIC.          Nurse visits on Fridays to check and reapply Coban 2 Lite, then to change Wound Vac when available. Follow up with Johnson Person CNP in 1 week in the wound care center. Call [phone] for any questions or concerns. Date__________   Time____________        Treatment Note Wound 05/10/22 Pretibial Left;Lateral-Dressing/Treatment:  (anasept, stim. , versatile, ca alginate.  foam, coban lite)    Written Patient Dismissal Instructions Given            Electronically signed by KISHA Govea CNP on 5/24/2022 at 2:13 PM

## 2022-05-24 NOTE — PLAN OF CARE
Problem: Wound:  Goal: Will show signs of wound healing; wound closure and no evidence of infection  Outcome: Progressing

## 2022-05-27 ENCOUNTER — HOSPITAL ENCOUNTER (OUTPATIENT)
Dept: WOUND CARE | Age: 52
Discharge: HOME | End: 2022-05-27
Payer: COMMERCIAL

## 2022-05-27 VITALS
RESPIRATION RATE: 18 BRPM | DIASTOLIC BLOOD PRESSURE: 86 MMHG | SYSTOLIC BLOOD PRESSURE: 154 MMHG | TEMPERATURE: 97.8 F | HEART RATE: 74 BPM

## 2022-05-27 PROCEDURE — 97605 NEG PRS WND THER DME<=50SQCM: CPT

## 2022-05-27 NOTE — PLAN OF CARE
Problem: Wound:  Goal: Will show signs of wound healing; wound closure and no evidence of infection  Description: Will show signs of wound healing; wound closure and no evidence of infection  Outcome: Progressing     Problem: Chronic Conditions and Co-morbidities  Goal: Patient's chronic conditions and co-morbidity symptoms are monitored and maintained or improved  Outcome: Progressing

## 2022-05-27 NOTE — PROGRESS NOTES
Negative Pressure Wound Therapy    NAME:  Girish De Paz  YOB: 1970  MEDICAL RECORD NUMBER:  [id number]  DATE:  5/27/2022     Applied Negative Pressure to ankle wound(s)/ulcer(s).  [x] Applied skin barrier prep to ilene-wound.  [x] Cut strips of plastic drape to picture frame wound so that ileen-wound is     covered with the drape.  [x] If bridging dressing to less prominent site, cover any intact skin that will come in contact with the Negative Pressure Therapy sponge, gauze or channel drain with plastic drape. The sponge should never touch intact skin.  [] Cut sponge, gauze or channel drain to size which will fit into the wound/ulcer bed without being forced.  [x] Be sure the sponge is large enough to hold the entire round plastic flange which is attached to the tubing. Never allow flange to be larger than the sponge or it will produce suction damaging intact skin.  Total number of individual pieces of foam used within the wound bed: 1     [x] If bridging the dressing away from the primary site, be sure the bridge leads to a piece of sponge large enough to hold the entire flange without allowing any of the flange to overlap onto intact skin.  [x] Covered sponge, gauze or channel drain with plastic drape.  [x] Cut a hole in this plastic drape directly over the sponge the same size as the plastic drain tubing.  [x] Removed plastic liner from flange and apply it directly over the hole you cut.  [x] Removed the plastic cover from the flange.  [x] Attached the tubing to the wound/ulcer Negative Pressure Therapy and turn it on to be sure a vacuum is created and that there are no leaks.  [x] If air leaks occur, use plastic drape to patch them.  [x] Secured Negative Pressure Therapy dressing with ace wrap loosely if located on an extremity. Maintain tubing outside of ace wrap. Tubing must not exert pressure on intact skin.     Applied per [employer] [employer] Guidelines      Electronically signed by Ligia Denson LPN on 2/59/5273 at 67:71 AM

## 2022-05-31 ENCOUNTER — HOSPITAL ENCOUNTER (OUTPATIENT)
Dept: WOUND CARE | Age: 52
Discharge: HOME | End: 2022-05-31
Payer: COMMERCIAL

## 2022-05-31 VITALS
DIASTOLIC BLOOD PRESSURE: 84 MMHG | TEMPERATURE: 97.9 F | RESPIRATION RATE: 16 BRPM | HEART RATE: 90 BPM | SYSTOLIC BLOOD PRESSURE: 153 MMHG

## 2022-05-31 DIAGNOSIS — L97.322 SKIN ULCER OF LEFT ANKLE WITH FAT LAYER EXPOSED (HCC): ICD-10-CM

## 2022-05-31 DIAGNOSIS — T14.8XXA NONHEALING NONSURGICAL WOUND WITH FAT LAYER EXPOSED: Primary | ICD-10-CM

## 2022-05-31 PROCEDURE — 11042 DBRDMT SUBQ TIS 1ST 20SQCM/<: CPT | Performed by: NURSE PRACTITIONER

## 2022-05-31 PROCEDURE — 11042 DBRDMT SUBQ TIS 1ST 20SQCM/<: CPT

## 2022-05-31 PROCEDURE — 97605 NEG PRS WND THER DME<=50SQCM: CPT

## 2022-05-31 RX ORDER — GINSENG 100 MG
CAPSULE ORAL ONCE
Status: CANCELLED | OUTPATIENT
Start: 2022-05-31 | End: 2022-05-31

## 2022-05-31 RX ORDER — LIDOCAINE HYDROCHLORIDE 40 MG/ML
SOLUTION TOPICAL ONCE
Status: CANCELLED | OUTPATIENT
Start: 2022-05-31 | End: 2022-05-31

## 2022-05-31 RX ORDER — LIDOCAINE 50 MG/G
OINTMENT TOPICAL ONCE
Status: CANCELLED | OUTPATIENT
Start: 2022-05-31 | End: 2022-05-31

## 2022-05-31 RX ORDER — GENTAMICIN SULFATE 1 MG/G
OINTMENT TOPICAL ONCE
Status: CANCELLED | OUTPATIENT
Start: 2022-05-31 | End: 2022-05-31

## 2022-05-31 RX ORDER — BACITRACIN, NEOMYCIN, POLYMYXIN B 400; 3.5; 5 [USP'U]/G; MG/G; [USP'U]/G
OINTMENT TOPICAL ONCE
Status: CANCELLED | OUTPATIENT
Start: 2022-05-31 | End: 2022-05-31

## 2022-05-31 RX ORDER — LIDOCAINE 40 MG/G
CREAM TOPICAL ONCE
Status: CANCELLED | OUTPATIENT
Start: 2022-05-31 | End: 2022-05-31

## 2022-05-31 RX ORDER — BETAMETHASONE DIPROPIONATE 0.05 %
OINTMENT (GRAM) TOPICAL ONCE
Status: CANCELLED | OUTPATIENT
Start: 2022-05-31 | End: 2022-05-31

## 2022-05-31 RX ORDER — BACITRACIN ZINC AND POLYMYXIN B SULFATE 500; 1000 [USP'U]/G; [USP'U]/G
OINTMENT TOPICAL ONCE
Status: CANCELLED | OUTPATIENT
Start: 2022-05-31 | End: 2022-05-31

## 2022-05-31 RX ORDER — CLOBETASOL PROPIONATE 0.5 MG/G
OINTMENT TOPICAL ONCE
Status: CANCELLED | OUTPATIENT
Start: 2022-05-31 | End: 2022-05-31

## 2022-05-31 ASSESSMENT — PAIN DESCRIPTION - FREQUENCY: FREQUENCY: INTERMITTENT

## 2022-05-31 ASSESSMENT — PAIN DESCRIPTION - DESCRIPTORS: DESCRIPTORS: ACHING

## 2022-05-31 ASSESSMENT — PAIN DESCRIPTION - PAIN TYPE: TYPE: ACUTE PAIN

## 2022-05-31 ASSESSMENT — PAIN DESCRIPTION - ORIENTATION: ORIENTATION: LEFT

## 2022-05-31 ASSESSMENT — PAIN - FUNCTIONAL ASSESSMENT: PAIN_FUNCTIONAL_ASSESSMENT: ACTIVITIES ARE NOT PREVENTED

## 2022-05-31 ASSESSMENT — PAIN DESCRIPTION - LOCATION: LOCATION: ANKLE

## 2022-05-31 ASSESSMENT — PAIN SCALES - GENERAL: PAINLEVEL_OUTOF10: 2

## 2022-05-31 ASSESSMENT — PAIN DESCRIPTION - ONSET: ONSET: ON-GOING

## 2022-05-31 NOTE — PROGRESS NOTES
Negative Pressure Wound Therapy    NAME:  Zoran Jackson  YOB: 1970  MEDICAL RECORD NUMBER:  [id number]  DATE:  5/31/2022     Applied Negative Pressure to Left Lateral Ankle wound(s)/ulcer(s).  [x] Applied skin barrier prep to ilene-wound.  [x] Cut strips of plastic drape to picture frame wound so that ilene-wound is     covered with the drape.  [x] If bridging dressing to less prominent site, cover any intact skin that will come in contact with the Negative Pressure Therapy sponge, gauze or channel drain with plastic drape. The sponge should never touch intact skin.  [x] Cut sponge, gauze or channel drain to size which will fit into the wound/ulcer bed without being forced.  [x] Be sure the sponge is large enough to hold the entire round plastic flange which is attached to the tubing. Never allow flange to be larger than the sponge or it will produce suction damaging intact skin.  Total number of individual pieces of foam used within the wound bed: 1     [x] If bridging the dressing away from the primary site, be sure the bridge leads to a piece of sponge large enough to hold the entire flange without allowing any of the flange to overlap onto intact skin.  [x] Covered sponge, gauze or channel drain with plastic drape.  [x] Cut a hole in this plastic drape directly over the sponge the same size as the plastic drain tubing.  [x] Removed plastic liner from flange and apply it directly over the hole you cut.  [x] Removed the plastic cover from the flange.  [x] Attached the tubing to the wound/ulcer Negative Pressure Therapy and turn it on to be sure a vacuum is created and that there are no leaks.  [x] If air leaks occur, use plastic drape to patch them.  [x] Secured Negative Pressure Therapy dressing with ace wrap loosely if located on an extremity. Maintain tubing outside of ace wrap. Tubing must not exert pressure on intact skin.     Applied per [employer] Guidelines      Electronically signed by Mikel Morales RN on 5/31/2022 at 9:57 AM

## 2022-05-31 NOTE — PROGRESS NOTES
Wound Care Center Progress Note With Procedure    Peña Haynes  AGE: 54 y.o. GENDER: male  : 1970  EPISODE DATE:  2022     Subjective:     Chief Complaint   Patient presents with    Wound Check     left ankle         HISTORY of PRESENT ILLNESS     Lauren patel 54 y.o. 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 The condition is of moderate severity. The ulcer has been present since 22 post left ankle hardware removal (related to pain) per Dr. Stewart Reading initially had left ankle open reduction internal fixation left ankle per Dr. Esteban Metzger 21. The underlying cause is thought to be nonhealing surgical.  The patients care to date has included evaluation and follow per Dr. Tracey Clements. The patient has significant underlying medical conditions as below.      NPT was delivered last week and was applied for the fdirst time on Friday. Patient pain is under control and he is already getting good benefit from NPT.   No other issues at this time      Wound Pain Timing/Severity: intermittent, mild  Quality of pain: dull, tender  Severity of pain:  2 / 10   Modifying Factors: edema, venous stasis, obesity and smoking  Associated Signs/Symptoms: edema, erythema, drainage and pain        PAST MEDICAL HISTORY        Diagnosis Date    Fracture     in ER 2021 fx left ankle- for surg 2021    History of alcohol abuse     Pericarditis     \"in 2015\" no longer forllow with cardiologist    Sleep apnea     had sleep study - uses cpap       PAST SURGICAL HISTORY    Past Surgical History:   Procedure Laterality Date    ANKLE FRACTURE SURGERY Left 2021    LEFT ANKLE OPEN REDUCTION INTERNAL FIXATION performed by Brian Espinal MD at [address] [place] [address] Left 2022    LEFT ANKLE HARDWARE REMOVAL performed by Brian Espinal MD at [address] [place] [address]    Family History   Problem Relation Age of Onset    Breast Cancer Mother     Cancer Father         lung cancer       SOCIAL HISTORY    Social History     Tobacco Use    Smoking status: Current Every Day Smoker     Packs/day: 1.50     Years: 25.00     Pack years: 37.50     Types: Cigarettes    Smokeless tobacco: Never Used    Tobacco comment: instructed adverse effects   Vaping Use    Vaping Use: Never used   Substance Use Topics    Alcohol use: Yes     Comment: average\" 5-6 beers per day\"    Drug use: Not Currently     Types: Marijuana Lovena Mems)     Comment: \"20+ yrs ago \"       ALLERGIES    No Known Allergies    MEDICATIONS    Current Outpatient Medications on File Prior to Encounter   Medication Sig Dispense Refill    aspirin 325 MG EC tablet Take 1 tablet by mouth daily 30 tablet 0     No current facility-administered medications on file prior to encounter. REVIEW OF SYSTEMS    Pertinent items are noted in HPI. Constitutional: Negative for systemic symptoms including fever, chills and malaise. Objective:      BP (!) 153/84   Pulse 90   Temp 97.9 °F (36.6 °C) (Temporal)   Resp 16     PHYSICAL EXAM      General: The patient is in no acute distress. Mental status:  Patient is appropriate, is  oriented to place and plan of care. Dermatologic exam: Visual inspection of the periwound reveals the skin to be normal in turgor and texture and dry  Wound exam: see wound description below in procedure note      Assessment:     Problem List Items Addressed This Visit        Other    WD-Nonhealing nonsurgical wound with fat layer exposed - Primary    WD-Skin ulcer of ankle with fat layer exposed ([address]        Procedure Note    Indications:  Based on my examination of this patient's wound(s) today, sharp excision into necrotic subcutaneous tissue is required to promote healing and evaluate the extent of previous healing.     Performed by: KISHA Mcclendon - CNP    Consent obtained: Yes    Time out taken:  Yes    Pain Control: N/A Debridement:Excisional Debridement    Using #15 blade scalpel the wound(s) was/were sharply debrided down through and including the removal of subcutaneous tissue. Devitalized Tissue Debrided:  fibrin, biofilm, slough and exudate    Pre Debridement Measurements:  Are located in the Wound Documentation Flow Sheet    All active wounds listed below with today's date are evaluated  Wound(s)    debrided this date include # : 1     Post  Debridement Measurements:  Negative Pressure Wound Therapy Leg Anterior;Left;Lower (Active)   Wound Type Surgical 05/31/22 0909   Unit Type kci 05/31/22 0909   Dressing Type Black Foam 05/31/22 0909   Number of pieces used 1 05/27/22 1026   Number of pieces removed 1 05/31/22 0909   Cycle Continuous 05/31/22 0909   Target Pressure (mmHg) 125 05/31/22 0909   Canister changed? No 05/31/22 0909   Dressing Status New dressing applied 05/27/22 1026   Dressing Changed Changed/New 05/27/22 1026   Drainage Amount Moderate 05/27/22 1026   Drainage Description Serosanguinous 05/27/22 1026   Shape irrgular 05/27/22 1026   Odor None 05/27/22 1026   Number of days: 3       Wound 05/10/22 Pretibial Left;Lateral (Active)   Wound Image   05/24/22 0920   Wound Etiology Non-Healing Surgical 05/27/22 1019   Dressing Status New dressing applied;Clean;Dry; Intact 05/27/22 1019   Wound Cleansed Wound cleanser 05/31/22 0909   Offloading for Diabetic Foot Ulcers Offloading not required 05/31/22 0909   Wound Length (cm) 7 cm 05/31/22 0909   Wound Width (cm) 1.6 cm 05/31/22 0909   Wound Depth (cm) 0.2 cm 05/31/22 0909   Wound Surface Area (cm^2) 11.2 cm^2 05/31/22 0909   Change in Wound Size % (l*w) -14.87 05/31/22 0909   Wound Volume (cm^3) 2.24 cm^3 05/31/22 0909   Wound Healing % -15 05/31/22 0909   Post-Procedure Length (cm) 6.6 cm 05/24/22 0938   Post-Procedure Width (cm) 1.9 cm 05/24/22 0938   Post-Procedure Depth (cm) 0.4 cm 05/24/22 0938   Post-Procedure Surface Area (cm^2) 12.54 cm^2 05/24/22 4372   Post-Procedure Volume (cm^3) 5.016 cm^3 05/24/22 0938   Distance Tunneling (cm) 0 cm 05/31/22 0909   Tunneling Position ___ O'Clock 0 05/31/22 0909   Undermining Starts ___ O'Clock 0 05/31/22 0909   Undermining Ends___ O'Clock 0 05/31/22 0909   Undermining Maxium Distance (cm) 0 05/31/22 0909   Wound Assessment Pink/red 05/31/22 0909   Drainage Amount Moderate 05/31/22 0909   Drainage Description Serosanguinous 05/31/22 0909   Odor None 05/31/22 0909   Reyna-wound Assessment Intact 05/31/22 0909   Margins Defined edges 05/31/22 0909   Wound Thickness Description not for Pressure Injury Full thickness 05/31/22 0909   Number of days: 20       Percent of Wound(s) Debrided: approximately 100%    Total  Area  Debrided:  11.2 sq cm     Bleeding:  Minimal    Hemostasis Achieved:  by pressure    Procedural Pain:  4  / 10     Post Procedural Pain:  0 / 10     Response to treatment:  Well tolerated by patient. Status of wound progress and description from last visit:   Improving. Continue to monitor and continue plan of care for now. Follow up 1 week with RN visits fridays       Plan:       Discharge Instructions       PHYSICIAN ORDERS AND DISCHARGE INSTRUCTIONS     NOTE: Upon discharge from the [address], you will receive a patient experience survey. We would be grateful if you would take the time to fill this survey out.     Wound care order history:                 KERI's   Right  0.56     Left 0.5              Date: 5/10/22              Cultures:                 Grafts:                Antibiotics:           Continuing wound care orders and information:                 Residence:                Continue home health care with:               Your wound-care supplies will be provided by:      Wound cleansing:               LZ not scrub or use excessive force.              Wash hands with soap and water before and after dressing changes.               UCMVT to applying a clean dressing, cleanse wound with normal saline, wound cleanser, or mild soap and water.               Ask the physician or nurse before getting the wound(s) wet in a shower     Daily Wound management:              Keep weight off wounds and reposition every 2 hours.              Avoid standing for long periods of time.              Apply wraps/stockings in AM and remove at bedtime.              If swelling is present, elevate legs to the level of the heart or above for 30 minutes 4-5 times a day and/or when sitting.                                      When taking antibiotics take entire prescription as ordered by physician do not stop taking until medicine is all gone.                                                           Orders for this week: 5/31/22        Culture of Left Lateral Ankle taken on 5/10/22.        Rx: Drug [facility] in [place]       (((Do not increase Coban 2 Lite to full strength due to low ABIs)))        Applied for wound vac on 5/10/22, once available start applying in clinic.     WOUND VAC THERAPY: Left Lateral Ankle     DUODERM TO PERIWOUND FOR PROTECTION. APPLY Anasept, Stimulen and BLACK FOAM TO WOUND. SECURE VAC DRESSING WITH DRAPE.     SET WOUND VAC  CONTINUOUS SUCTION. CANISTER CHANGE WEEKLY OR ACCORDING TO VOLUME OF DRAINAGE.     WOUND VAC DRESSING TO BE CHANGED TUESDAYS AND FRIDAYS IN CLINIC.          Nurse visits on Fridays to change Wound Vac. Follow up with Rayray Beasley CNP in 1 week in the wound care center. Call [phone] for any questions or concerns.   Date__________   Time____________        Treatment Note      Written Patient Dismissal Instructions Given            Electronically signed by KISHA Shaikh CNP on 5/31/2022 at 9:23 AM

## 2022-06-03 ENCOUNTER — HOSPITAL ENCOUNTER (OUTPATIENT)
Dept: WOUND CARE | Age: 52
Discharge: HOME | End: 2022-06-03
Payer: COMMERCIAL

## 2022-06-03 VITALS
TEMPERATURE: 97.8 F | RESPIRATION RATE: 18 BRPM | HEART RATE: 84 BPM | SYSTOLIC BLOOD PRESSURE: 138 MMHG | DIASTOLIC BLOOD PRESSURE: 84 MMHG

## 2022-06-03 PROCEDURE — 97605 NEG PRS WND THER DME<=50SQCM: CPT

## 2022-06-03 ASSESSMENT — PAIN SCALES - GENERAL: PAINLEVEL_OUTOF10: 0

## 2022-06-03 ASSESSMENT — PAIN - FUNCTIONAL ASSESSMENT: PAIN_FUNCTIONAL_ASSESSMENT: ACTIVITIES ARE NOT PREVENTED

## 2022-06-03 NOTE — PLAN OF CARE
Problem: Cognitive:  Goal: Knowledge of wound care  Description: Knowledge of wound care  Outcome: Progressing     Problem: Compression therapy:  Goal: Will be free from complications associated with compression therapy  Description: Will be free from complications associated with compression therapy  Outcome: Progressing

## 2022-06-03 NOTE — PROGRESS NOTES
Negative Pressure Wound Therapy    NAME:  Kayla Navas  YOB: 1970  MEDICAL RECORD NUMBER:  [id number]  DATE:  6/3/2022     Applied Negative Pressure to left leg wound(s)/ulcer(s).  [x] Applied skin barrier prep to ilene-wound.  [x] Cut strips of plastic drape to picture frame wound so that ilene-wound is     covered with the drape.  [x] If bridging dressing to less prominent site, cover any intact skin that will come in contact with the Negative Pressure Therapy sponge, gauze or channel drain with plastic drape. The sponge should never touch intact skin.  [x] Cut sponge, gauze or channel drain to size which will fit into the wound/ulcer bed without being forced.  [x] Be sure the sponge is large enough to hold the entire round plastic flange which is attached to the tubing. Never allow flange to be larger than the sponge or it will produce suction damaging intact skin.  Total number of individual pieces of foam used within the wound bed: 1     [x] If bridging the dressing away from the primary site, be sure the bridge leads to a piece of sponge large enough to hold the entire flange without allowing any of the flange to overlap onto intact skin.  [x] Covered sponge, gauze or channel drain with plastic drape.  [x] Cut a hole in this plastic drape directly over the sponge the same size as the plastic drain tubing.  [x] Removed plastic liner from flange and apply it directly over the hole you cut.  [x] Removed the plastic cover from the flange.  [x] Attached the tubing to the wound/ulcer Negative Pressure Therapy and turn it on to be sure a vacuum is created and that there are no leaks.  [x] If air leaks occur, use plastic drape to patch them.  [x] Secured Negative Pressure Therapy dressing with ace wrap loosely if located on an extremity. Maintain tubing outside of ace wrap. Tubing must not exert pressure on intact skin.     Applied per [facility] Guidelines      Electronically signed by Esme Helton RN on 6/3/2022 at 9:45 AM

## 2022-06-07 ENCOUNTER — HOSPITAL ENCOUNTER (OUTPATIENT)
Dept: WOUND CARE | Age: 52
Discharge: HOME | End: 2022-06-07
Payer: COMMERCIAL

## 2022-06-07 VITALS — RESPIRATION RATE: 20 BRPM

## 2022-06-07 DIAGNOSIS — L97.322 SKIN ULCER OF LEFT ANKLE WITH FAT LAYER EXPOSED (HCC): ICD-10-CM

## 2022-06-07 DIAGNOSIS — T14.8XXA NONHEALING NONSURGICAL WOUND WITH FAT LAYER EXPOSED: Primary | ICD-10-CM

## 2022-06-07 PROCEDURE — 11042 DBRDMT SUBQ TIS 1ST 20SQCM/<: CPT | Performed by: NURSE PRACTITIONER

## 2022-06-07 PROCEDURE — 97605 NEG PRS WND THER DME<=50SQCM: CPT

## 2022-06-07 PROCEDURE — 11042 DBRDMT SUBQ TIS 1ST 20SQCM/<: CPT

## 2022-06-07 RX ORDER — BETAMETHASONE DIPROPIONATE 0.05 %
OINTMENT (GRAM) TOPICAL ONCE
Status: CANCELLED | OUTPATIENT
Start: 2022-06-07 | End: 2022-06-07

## 2022-06-07 RX ORDER — LIDOCAINE 50 MG/G
OINTMENT TOPICAL ONCE
Status: CANCELLED | OUTPATIENT
Start: 2022-06-07 | End: 2022-06-07

## 2022-06-07 RX ORDER — CLOBETASOL PROPIONATE 0.5 MG/G
OINTMENT TOPICAL ONCE
Status: CANCELLED | OUTPATIENT
Start: 2022-06-07 | End: 2022-06-07

## 2022-06-07 RX ORDER — BACITRACIN, NEOMYCIN, POLYMYXIN B 400; 3.5; 5 [USP'U]/G; MG/G; [USP'U]/G
OINTMENT TOPICAL ONCE
Status: CANCELLED | OUTPATIENT
Start: 2022-06-07 | End: 2022-06-07

## 2022-06-07 RX ORDER — BACITRACIN ZINC AND POLYMYXIN B SULFATE 500; 1000 [USP'U]/G; [USP'U]/G
OINTMENT TOPICAL ONCE
Status: CANCELLED | OUTPATIENT
Start: 2022-06-07 | End: 2022-06-07

## 2022-06-07 RX ORDER — LIDOCAINE 40 MG/G
CREAM TOPICAL ONCE
Status: CANCELLED | OUTPATIENT
Start: 2022-06-07 | End: 2022-06-07

## 2022-06-07 RX ORDER — LIDOCAINE HYDROCHLORIDE 40 MG/ML
SOLUTION TOPICAL ONCE
Status: CANCELLED | OUTPATIENT
Start: 2022-06-07 | End: 2022-06-07

## 2022-06-07 RX ORDER — GINSENG 100 MG
CAPSULE ORAL ONCE
Status: CANCELLED | OUTPATIENT
Start: 2022-06-07 | End: 2022-06-07

## 2022-06-07 RX ORDER — GENTAMICIN SULFATE 1 MG/G
OINTMENT TOPICAL ONCE
Status: CANCELLED | OUTPATIENT
Start: 2022-06-07 | End: 2022-06-07

## 2022-06-07 NOTE — PLAN OF CARE
Problem: Wound:  Goal: Will show signs of wound healing; wound closure and no evidence of infection  Description: Will show signs of wound healing; wound closure and no evidence of infection  6/7/2022 1057 by Harika Galicia RN  Outcome: Progressing  6/7/2022 1052 by Saida Gray LPN  Outcome: Progressing     Problem: Pain  Goal: Verbalizes/displays adequate comfort level or baseline comfort level  6/7/2022 1057 by Harika Galicia RN  Outcome: Progressing  6/7/2022 1052 by Saida Gray LPN  Outcome: Progressing Thrombosed hemorrhoids

## 2022-06-07 NOTE — PLAN OF CARE
Problem: Wound:  Goal: Will show signs of wound healing; wound closure and no evidence of infection  Description: Will show signs of wound healing; wound closure and no evidence of infection  Outcome: Progressing     Problem: Wound:  Goal: Will show signs of wound healing; wound closure and no evidence of infection  Description: Will show signs of wound healing; wound closure and no evidence of infection  Outcome: Progressing

## 2022-06-07 NOTE — PROGRESS NOTES
Negative Pressure Wound Therapy    NAME:  Adrianna Ventura  YOB: 1970  MEDICAL RECORD NUMBER:  [id number]  DATE:  6/7/2022     Applied Negative Pressure to Left foot wound(s)/ulcer(s).  [x] Applied skin barrier prep to ilene-wound.  [x] Cut strips of plastic drape to picture frame wound so that ilene-wound is     covered with the drape.  [x] If bridging dressing to less prominent site, cover any intact skin that will come in contact with the Negative Pressure Therapy sponge, gauze or channel drain with plastic drape. The sponge should never touch intact skin.  [x] Cut sponge, gauze or channel drain to size which will fit into the wound/ulcer bed without being forced.  [x] Be sure the sponge is large enough to hold the entire round plastic flange which is attached to the tubing. Never allow flange to be larger than the sponge or it will produce suction damaging intact skin.  Total number of individual pieces of foam used within the wound bed: 1     [x] If bridging the dressing away from the primary site, be sure the bridge leads to a piece of sponge large enough to hold the entire flange without allowing any of the flange to overlap onto intact skin.  [x] Covered sponge, gauze or channel drain with plastic drape.  [x] Cut a hole in this plastic drape directly over the sponge the same size as the plastic drain tubing.  [x] Removed plastic liner from flange and apply it directly over the hole you cut.  [x] Removed the plastic cover from the flange.  [x] Attached the tubing to the wound/ulcer Negative Pressure Therapy and turn it on to be sure a vacuum is created and that there are no leaks.  [x] If air leaks occur, use plastic drape to patch them.  [x] Secured Negative Pressure Therapy dressing with ace wrap loosely if located on an extremity. Maintain tubing outside of ace wrap. Tubing must not exert pressure on intact skin.     Applied per Yur[facility] Yuan Guidelines      Electronically signed by Carline Forde LPN on 9/1/3959 at 67:12 AM

## 2022-06-07 NOTE — PROGRESS NOTES
Wound Care Center Progress Note With Procedure    Carleen Munguia  AGE: 50 y.o. GENDER: male  : 1970  EPISODE DATE:  2022     Subjective:     Chief Complaint   Patient presents with    Wound Check     left leg         HISTORY of PRESENT ILLNESS     Katia Esposito a 54 y.o. 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 The condition is of moderate severity. The ulcer has been present since 22 post left ankle hardware removal (related to pain) per Dr. Maryse Murray initially had left ankle open reduction internal fixation left ankle per Dr. Kenn Shin 21. The underlying cause is thought to be nonhealing surgical.  The patients care to date has included evaluation and follow per Dr. Guy Landrum. The patient has significant underlying medical conditions as below.      Patient no getting regular NPT changes. No issues to report.   Continued improvement     Wound Pain Timing/Severity: intermittent, mild  Quality of pain: dull, tender  Severity of pain:  2 / 10   Modifying Factors: edema, venous stasis, obesity and smoking  Associated Signs/Symptoms: edema, erythema, drainage and pain        PAST MEDICAL HISTORY        Diagnosis Date    Fracture     in ER 2021 fx left ankle- for surg 2021    History of alcohol abuse     Pericarditis     \"in \" no longer forllow with cardiologist    Sleep apnea     had sleep study - uses cpap       PAST SURGICAL HISTORY    Past Surgical History:   Procedure Laterality Date    ANKLE FRACTURE SURGERY Left 2021    LEFT ANKLE OPEN REDUCTION INTERNAL FIXATION performed by Wilma Mendoza MD at [address] [place] [address] Left 2022    LEFT ANKLE HARDWARE REMOVAL performed by Wilma Mendoza MD at [address] [place] [address]    Family History   Problem Relation Age of Onset    Breast Cancer Mother     Cancer Father         lung cancer       SOCIAL HISTORY    Social History     Tobacco Use    Smoking status: Current Every Day Smoker     Packs/day: 1.50     Years: 25.00     Pack years: 37.50     Types: Cigarettes    Smokeless tobacco: Never Used    Tobacco comment: instructed adverse effects   Vaping Use    Vaping Use: Never used   Substance Use Topics    Alcohol use: Yes     Comment: average\" 5-6 beers per day\"    Drug use: Not Currently     Types: Marijuana Veryl Orosco)     Comment: \"20+ yrs ago \"       ALLERGIES    No Known Allergies    MEDICATIONS    Current Outpatient Medications on File Prior to Encounter   Medication Sig Dispense Refill    aspirin 325 MG EC tablet Take 1 tablet by mouth daily 30 tablet 0     No current facility-administered medications on file prior to encounter. REVIEW OF SYSTEMS    Pertinent items are noted in HPI. Constitutional: Negative for systemic symptoms including fever, chills and malaise. Objective:      Resp 20     PHYSICAL EXAM      General: The patient is in no acute distress. Mental status:  Patient is appropriate, is  oriented to place and plan of care. Dermatologic exam: Visual inspection of the periwound reveals the skin to be normal in turgor and texture and dry  Wound exam: see wound description below in procedure note      Assessment:     Problem List Items Addressed This Visit        Other    WD-Nonhealing nonsurgical wound with fat layer exposed - Primary    Relevant Orders    Initiate Outpatient Wound Care Protocol    Neg. Pressure Wound Therapy    WD-Skin ulcer of ankle with fat layer exposed ([address]    Relevant Orders    Initiate Outpatient Wound Care Protocol    Neg. Pressure Wound Therapy        Procedure Note    Indications:  Based on my examination of this patient's wound(s) today, sharp excision into necrotic subcutaneous tissue is required to promote healing and evaluate the extent of previous healing.     Performed by: KISHA Diaz - CNP    Consent obtained: Yes    Time out taken: Yes    Pain Control: 4% Lidocaine Liquid Topical        Debridement:Excisional Debridement    Using #15 blade scalpel the wound(s) was/were sharply debrided down through and including the removal of subcutaneous tissue. Devitalized Tissue Debrided:  fibrin, biofilm, slough and exudate    Pre Debridement Measurements:  Are located in the Wound Documentation Flow Sheet    All active wounds listed below with today's date are evaluated  Wound(s)    debrided this date include # : 1     Post  Debridement Measurements:  Negative Pressure Wound Therapy Leg Anterior;Left;Lower (Active)   Wound Type Surgical 06/03/22 0949   Unit Type KCI 06/03/22 0949   Dressing Type Black Foam 06/03/22 0949   Number of pieces used 1 06/03/22 0949   Number of pieces removed 1 06/07/22 0957   Cycle Continuous 06/03/22 0949   Target Pressure (mmHg) 125 06/03/22 0949   Canister changed?  No 06/03/22 0949   Dressing Status New dressing applied 06/03/22 0949   Dressing Changed Changed/New 06/03/22 0949   Drainage Amount Moderate 06/03/22 0949   Drainage Description Serosanguinous 06/03/22 0949   Shape irregular 06/03/22 0949   Odor None 06/03/22 0949   Number of days: 11       Wound 05/10/22 Pretibial Left;Lateral (Active)   Wound Image   06/07/22 0957   Wound Etiology Non-Healing Surgical 06/07/22 0957   Dressing Status New dressing applied 06/03/22 0937   Wound Cleansed Wound cleanser 06/07/22 0957   Offloading for Diabetic Foot Ulcers Offloading not required 06/07/22 0957   Wound Length (cm) 6.4 cm 06/07/22 0957   Wound Width (cm) 1.4 cm 06/07/22 0957   Wound Depth (cm) 0.2 cm 06/07/22 0957   Wound Surface Area (cm^2) 8.96 cm^2 06/07/22 0957   Change in Wound Size % (l*w) 8.1 06/07/22 0957   Wound Volume (cm^3) 1.792 cm^3 06/07/22 0957   Wound Healing % 8 06/07/22 0957   Post-Procedure Length (cm) 6.4 cm 06/07/22 1021   Post-Procedure Width (cm) 1.4 cm 06/07/22 1021   Post-Procedure Depth (cm) 0.2 cm 06/07/22 1021   Post-Procedure Surface Area (cm^2) 8.96 cm^2 06/07/22 1021   Post-Procedure Volume (cm^3) 1.792 cm^3 06/07/22 1021   Distance Tunneling (cm) 0 cm 06/07/22 0957   Tunneling Position ___ O'Clock 0 06/07/22 0957   Undermining Starts ___ O'Clock 0 06/07/22 0957   Undermining Ends___ O'Clock 0 06/07/22 0957   Undermining Maxium Distance (cm) 0 06/07/22 0957   Wound Assessment Pink/red 06/07/22 0957   Drainage Amount Moderate 06/07/22 0957   Drainage Description Serosanguinous 06/07/22 0957   Odor None 06/07/22 0957   Reyna-wound Assessment Intact 06/07/22 0957   Margins Defined edges 06/07/22 0957   Wound Thickness Description not for Pressure Injury Full thickness 06/07/22 0957   Number of days: 28       Percent of Wound(s) Debrided: approximately 100%    Total  Area  Debrided:  8.96 sq cm     Bleeding:  Minimal    Hemostasis Achieved:  by pressure    Procedural Pain:  4  / 10     Post Procedural Pain:  0 / 10     Response to treatment:  Well tolerated by patient. Status of wound progress and description from last visit:   Onesimoing. Continue to monitor and follow up 1 week with provider. RN visit Friday       Plan:       Discharge Instructions       PHYSICIAN ORDERS AND DISCHARGE INSTRUCTIONS     NOTE: Upon discharge from the [address], you will receive a patient experience survey. We would be grateful if you would take the time to fill this survey out.     Wound care order history:                 KERI's   Right  0.56     Left 0.5              Date: 5/10/22              Cultures:                 Grafts:                Antibiotics:           Continuing wound care orders and information:                 Residence:                Continue home health care with:               Your wound-care supplies will be provided by:      Wound cleansing:               ZI not scrub or use excessive force.              Wash hands with soap and water before and after dressing changes.               IXCZV to applying a clean dressing, cleanse wound with normal saline, wound cleanser, or mild soap and water.               Ask the physician or nurse before getting the wound(s) wet in a shower     Daily Wound management:              Keep weight off wounds and reposition every 2 hours.              Avoid standing for long periods of time.              Apply wraps/stockings in AM and remove at bedtime.              If swelling is present, elevate legs to the level of the heart or above for 30 minutes 4-5 times a day and/or when sitting.                                      When taking antibiotics take entire prescription as ordered by physician do not stop taking until medicine is all gone.                                                           Orders for this week: 6/7/22        Culture of Left Lateral Ankle taken on 5/10/22.        Rx: [facility] in [place]        (((Do not increase Coban 2 Lite to full strength due to low ABIs)))        Applied for wound vac on 5/10/22, once available start applying in clinic.     WOUND VAC THERAPY: Left Lateral Ankle     DUODERM TO PERIWOUND FOR PROTECTION. APPLY Anasept, Stimulen and BLACK FOAM TO WOUND. SECURE VAC DRESSING WITH DRAPE.     SET WOUND VAC  CONTINUOUS SUCTION. CANISTER CHANGE WEEKLY OR ACCORDING TO VOLUME OF DRAINAGE.     WOUND VAC DRESSING TO BE CHANGED TUESDAYS AND FRIDAYS IN CLINIC.          Nurse visits on Fridays to change Wound Vac. Follow up with Chung Okeefe CNP in 1 week in the wound care center. Call [phone] for any questions or concerns.   Date__________   Time____________        Treatment Note      Written Patient Dismissal Instructions Given            Electronically signed by KISHA Linder CNP on 6/7/2022 at 10:28 AM

## 2022-06-07 NOTE — PROGRESS NOTES
I was helping Eliud Inman LPN with  charting when I  added a plan of care under her name. I deleted it and notified Nurse Manager Bereket Ruano RN BSN .

## 2022-06-10 ENCOUNTER — HOSPITAL ENCOUNTER (OUTPATIENT)
Dept: WOUND CARE | Age: 52
Discharge: HOME | End: 2022-06-10
Payer: COMMERCIAL

## 2022-06-10 VITALS
RESPIRATION RATE: 18 BRPM | TEMPERATURE: 97.6 F | DIASTOLIC BLOOD PRESSURE: 96 MMHG | HEART RATE: 64 BPM | SYSTOLIC BLOOD PRESSURE: 164 MMHG

## 2022-06-10 PROCEDURE — 97606 NEG PRS WND THER DME>50 SQCM: CPT

## 2022-06-10 ASSESSMENT — PAIN SCALES - GENERAL: PAINLEVEL_OUTOF10: 0

## 2022-06-10 NOTE — PROGRESS NOTES
Negative Pressure Wound Therapy    NAME:  Carleen Munguia  YOB: 1970  MEDICAL RECORD NUMBER:  [id number]  DATE:  6/10/2022     Applied Negative Pressure to left ankle .  [x] Applied skin barrier prep to ilene-wound.  [x] Cut strips of plastic drape to picture frame wound so that ilene-wound is     covered with the drape.  [x] If bridging dressing to less prominent site, cover any intact skin that will come in contact with the Negative Pressure Therapy sponge, gauze or channel drain with plastic drape. The sponge should never touch intact skin.  [x] Cut sponge, gauze or channel drain to size which will fit into the wound/ulcer bed without being forced.  [x] Be sure the sponge is large enough to hold the entire round plastic flange which is attached to the tubing. Never allow flange to be larger than the sponge or it will produce suction damaging intact skin.  Total number of individual pieces of foam used within the wound bed: 1     [x] If bridging the dressing away from the primary site, be sure the bridge leads to a piece of sponge large enough to hold the entire flange without allowing any of the flange to overlap onto intact skin.  [x] Covered sponge, gauze or channel drain with plastic drape.  [x] Cut a hole in this plastic drape directly over the sponge the same size as the plastic drain tubing.  [x] Removed plastic liner from flange and apply it directly over the hole you cut.  [x] Removed the plastic cover from the flange.  [x] Attached the tubing to the wound/ulcer Negative Pressure Therapy and turn it on to be sure a vacuum is created and that there are no leaks.  [x] If air leaks occur, use plastic drape to patch them.  [x] Secured Negative Pressure Therapy dressing with ace wrap loosely if located on an extremity. Maintain tubing outside of ace wrap. Tubing must not exert pressure on intact skin.     Applied per  Guidelines      Electronically signed by David Henriquez LPN on 7/70/7081 at 4:14 AM

## 2022-06-14 ENCOUNTER — HOSPITAL ENCOUNTER (OUTPATIENT)
Dept: WOUND CARE | Age: 52
Discharge: HOME | End: 2022-06-14
Payer: COMMERCIAL

## 2022-06-14 VITALS
HEART RATE: 96 BPM | TEMPERATURE: 97.1 F | RESPIRATION RATE: 18 BRPM | SYSTOLIC BLOOD PRESSURE: 189 MMHG | DIASTOLIC BLOOD PRESSURE: 79 MMHG

## 2022-06-14 DIAGNOSIS — T14.8XXA NONHEALING NONSURGICAL WOUND WITH FAT LAYER EXPOSED: Primary | ICD-10-CM

## 2022-06-14 DIAGNOSIS — L97.322 SKIN ULCER OF LEFT ANKLE WITH FAT LAYER EXPOSED (HCC): ICD-10-CM

## 2022-06-14 PROCEDURE — 11042 DBRDMT SUBQ TIS 1ST 20SQCM/<: CPT

## 2022-06-14 PROCEDURE — 11042 DBRDMT SUBQ TIS 1ST 20SQCM/<: CPT | Performed by: NURSE PRACTITIONER

## 2022-06-14 PROCEDURE — 97605 NEG PRS WND THER DME<=50SQCM: CPT

## 2022-06-14 RX ORDER — BACITRACIN, NEOMYCIN, POLYMYXIN B 400; 3.5; 5 [USP'U]/G; MG/G; [USP'U]/G
OINTMENT TOPICAL ONCE
Status: CANCELLED | OUTPATIENT
Start: 2022-06-14 | End: 2022-06-14

## 2022-06-14 RX ORDER — LIDOCAINE 50 MG/G
OINTMENT TOPICAL ONCE
Status: CANCELLED | OUTPATIENT
Start: 2022-06-14 | End: 2022-06-14

## 2022-06-14 RX ORDER — BETAMETHASONE DIPROPIONATE 0.05 %
OINTMENT (GRAM) TOPICAL ONCE
Status: CANCELLED | OUTPATIENT
Start: 2022-06-14 | End: 2022-06-14

## 2022-06-14 RX ORDER — BACITRACIN ZINC AND POLYMYXIN B SULFATE 500; 1000 [USP'U]/G; [USP'U]/G
OINTMENT TOPICAL ONCE
Status: CANCELLED | OUTPATIENT
Start: 2022-06-14 | End: 2022-06-14

## 2022-06-14 RX ORDER — CLOBETASOL PROPIONATE 0.5 MG/G
OINTMENT TOPICAL ONCE
Status: CANCELLED | OUTPATIENT
Start: 2022-06-14 | End: 2022-06-14

## 2022-06-14 RX ORDER — LIDOCAINE HYDROCHLORIDE 40 MG/ML
SOLUTION TOPICAL ONCE
Status: CANCELLED | OUTPATIENT
Start: 2022-06-14 | End: 2022-06-14

## 2022-06-14 RX ORDER — GENTAMICIN SULFATE 1 MG/G
OINTMENT TOPICAL ONCE
Status: CANCELLED | OUTPATIENT
Start: 2022-06-14 | End: 2022-06-14

## 2022-06-14 RX ORDER — GINSENG 100 MG
CAPSULE ORAL ONCE
Status: CANCELLED | OUTPATIENT
Start: 2022-06-14 | End: 2022-06-14

## 2022-06-14 RX ORDER — LIDOCAINE 40 MG/G
CREAM TOPICAL ONCE
Status: CANCELLED | OUTPATIENT
Start: 2022-06-14 | End: 2022-06-14

## 2022-06-14 ASSESSMENT — PAIN SCALES - GENERAL: PAINLEVEL_OUTOF10: 0

## 2022-06-14 NOTE — PROGRESS NOTES
Negative Pressure Wound Therapy    NAME:  Trinity Busby  YOB: 1970  MEDICAL RECORD NUMBER:  [id number]  DATE:  6/14/2022     Applied Negative Pressure to left lateral ankle.  [x] Applied skin barrier prep to ilene-wound.  [x] Cut strips of plastic drape to picture frame wound so that ilene-wound is     covered with the drape.  [x] If bridging dressing to less prominent site, cover any intact skin that will come in contact with the Negative Pressure Therapy sponge, gauze or channel drain with plastic drape. The sponge should never touch intact skin.  [x] Cut sponge, gauze or channel drain to size which will fit into the wound/ulcer bed without being forced.  [x] Be sure the sponge is large enough to hold the entire round plastic flange which is attached to the tubing. Never allow flange to be larger than the sponge or it will produce suction damaging intact skin.  Total number of individual pieces of foam used within the wound bed: 1     [x] If bridging the dressing away from the primary site, be sure the bridge leads to a piece of sponge large enough to hold the entire flange without allowing any of the flange to overlap onto intact skin.  [x] Covered sponge, gauze or channel drain with plastic drape.  [x] Cut a hole in this plastic drape directly over the sponge the same size as the plastic drain tubing.  [x] Removed plastic liner from flange and apply it directly over the hole you cut.  [x] Removed the plastic cover from the flange.  [x] Attached the tubing to the wound/ulcer Negative Pressure Therapy and turn it on to be sure a vacuum is created and that there are no leaks.  [x] If air leaks occur, use plastic drape to patch them.  [x] Secured Negative Pressure Therapy dressing with ace wrap loosely if located on an extremity. Maintain tubing outside of ace wrap. Tubing must not exert pressure on intact skin.     Applied per Yuridia Yuan Guidelines      Electronically signed by Lashawn Saleh LPN on 8/44/1726 at 6:01 AM

## 2022-06-14 NOTE — PROGRESS NOTES
Wound Care Center Progress Note With Procedure    Dee Higgins  AGE: 50 y.o. GENDER: male  : 1970  EPISODE DATE:  2022     Subjective:     Chief Complaint   Patient presents with    Wound Check     LEFT ANKLE         HISTORY of PRESENT ILLNESS   Dee Higgins is a 54 y.o. male who presents to the [address] [place] [address] for evaluation and treatment of Chronic non-healing surgical  ulcer(s) of the left lateral ankle. The condition is of moderate severity. The ulcer has been present since 22 post left ankle hardware removal (related to pain) per Dr. Alexandr Gaming. He initially had left ankle open reduction internal fixation left ankle per Dr. Alexandr Gaming 21. The underlying cause is thought to be nonhealing surgical.  The patients care to date has included evaluation and follow per Dr. Annette Makr. The patient has significant underlying medical conditions as below.      Wound Pain Timing/Severity: intermittent, mild  Quality of pain: dull, tender  Severity of pain:  2 / 10   Modifying Factors: edema, venous stasis, obesity and smoking  Associated Signs/Symptoms: edema, erythema, drainage and pain     KERI: Right 0.56, left 0.5 as of 5/10/22     Arterial evaluation: based on wound; locations, assessment and healing progress        Venous Evaluation: based on wound; locations, assessment and healing progress     Wound infection: wound culture obtained 5/10/22     Diabetes: No  Smoking: Yes (approximately 1 pack per day). Patient counseled in length on smoking cessation including benefits of quitting and health risks of continuing to smoke including but not limited to lung cancer, COPD, risk of coronary artery disease. Patient verbalized understanding today, is not ready to quit. Anticoagulant/Antiplatelet therapy: Low dose asprin  Immunosuppression: No  Obesity: Yes  Nutritional status:  well nourished.  Discussed need for increased protein and calories for wound healing and good sources of protein (just over 7 grams for every 20 pounds of body weight). Animal-based foods high in protein (meat, poultry, fish, eggs, and dairy foods). Plant based foods high in protein (tofu, lentils, beans, chickpeas, nuts, quinoa and abeba seeds. Other History:     Patient educated on the 6 essential components necessary for wound healing: Circulation, Debridements, Proper Dressings and Topical Wound Products, Infection Control, Edema Control and Offloading.     Patient educated on those factors that negatively effect or impact wound healing: smoking, obesity, uncontrolled diabetes, anticoagulant and immunosuppressive regimens, inadequate nutrition, untreated arterial and venous disease if applicable and measures to manage edema.           PAST MEDICAL HISTORY        Diagnosis Date    Fracture     in ER 7/24/2021 fx left ankle- for surg 7/30/2021    History of alcohol abuse     Pericarditis     \"in 2015\" no longer forllow with cardiologist    Sleep apnea     had sleep study 2014- uses cpap       PAST SURGICAL HISTORY    Past Surgical History:   Procedure Laterality Date    ANKLE FRACTURE SURGERY Left 7/30/2021    LEFT ANKLE OPEN REDUCTION INTERNAL FIXATION performed by Wilbur Reyes MD at [address] [place] [address] Left 1/19/2022    LEFT ANKLE HARDWARE REMOVAL performed by Wilbur Reyes MD at [address] [place] [address]    Family History   Problem Relation Age of Onset    Breast Cancer Mother     Cancer Father         lung cancer       SOCIAL HISTORY    Social History     Tobacco Use    Smoking status: Current Every Day Smoker     Packs/day: 1.50     Years: 25.00     Pack years: 37.50     Types: Cigarettes    Smokeless tobacco: Never Used    Tobacco comment: instructed adverse effects   Vaping Use    Vaping Use: Never used   Substance Use Topics    Alcohol use: Yes     Comment: average\" 5-6 beers per day\"    Drug use: Not Currently     Types: Marijuana Lisa Gore)     Comment: \"20+ yrs ago \"       ALLERGIES    No Known Allergies    MEDICATIONS    Current Outpatient Medications on File Prior to Encounter   Medication Sig Dispense Refill    aspirin 325 MG EC tablet Take 1 tablet by mouth daily 30 tablet 0     No current facility-administered medications on file prior to encounter. REVIEW OF SYSTEMS    Pertinent items are noted in HPI. Constitutional: Negative for systemic symptoms including fever, chills and malaise. Objective:      BP (!) 189/79   Pulse 96   Temp 97.1 °F (36.2 °C) (Temporal)   Resp 18     PHYSICAL EXAM    General: The patient is in no acute distress. Mental status:  Patient is appropriate, is  oriented to place and plan of care. Dermatologic exam: Visual inspection of the periwound reveals the skin to be edematous  Wound exam: see wound description below in procedure note      Assessment:     Problem List Items Addressed This Visit        Other    WD-Nonhealing nonsurgical wound with fat layer exposed - Primary    Relevant Orders    Initiate Outpatient Wound Care Protocol    WD-Skin ulcer of ankle with fat layer exposed ([address]    Relevant Orders    Initiate Outpatient Wound Care Protocol        Procedure Note    Indications:  Based on my examination of this patient's wound(s) today, sharp excision into necrotic subcutaneous tissue is required to promote healing and evaluate the extent of previous healing. Performed by: KISHA Soto - CNP    Consent obtained: Yes    Time out taken:  Yes    Pain Control:       Debridement:Excisional Debridement    Using curette the wound(s) was/were sharply debrided down through and including the removal of subcutaneous tissue.         Devitalized Tissue Debrided:  slough and exudate    Pre Debridement Measurements:  Are located in the Wound Documentation Flow Sheet    All active wounds listed below with today's date are evaluated  Wound(s)    debrided this date include # : 1     Post  Debridement Measurements:  Negative Pressure Wound Therapy Leg Anterior;Left;Lower (Active)   Wound Type Surgical 06/10/22 0930   Unit Type KCI 06/10/22 0930   Dressing Type Black Foam 06/03/22 0949   Number of pieces used 2 06/10/22 0930   Number of pieces removed 1 06/10/22 0930   Cycle Continuous 06/10/22 0930   Target Pressure (mmHg) 125 06/10/22 0930   Canister changed?  No 06/10/22 0930   Dressing Status New dressing applied 06/10/22 0930   Dressing Changed Changed/New 06/10/22 0930   Drainage Amount Moderate 06/10/22 0930   Drainage Description Serosanguinous 06/10/22 0930   Dressing Change Due 06/09/22 06/07/22 1026   Shape irregular 06/07/22 1026   Odor None 06/07/22 1026   Number of days: 17       Wound 05/10/22 Pretibial Left;Lateral (Active)   Wound Image   06/07/22 0957   Wound Etiology Non-Healing Surgical 06/14/22 0909   Dressing Status New dressing applied 06/10/22 0930   Wound Cleansed Wound cleanser;Cleansed with saline 06/14/22 0909   Offloading for Diabetic Foot Ulcers Offloading not required 06/14/22 0909   Wound Length (cm) 6.5 cm 06/14/22 0909   Wound Width (cm) 1.5 cm 06/14/22 0909   Wound Depth (cm) 0.2 cm 06/14/22 0909   Wound Surface Area (cm^2) 9.75 cm^2 06/14/22 0909   Change in Wound Size % (l*w) 0 06/14/22 0909   Wound Volume (cm^3) 1.95 cm^3 06/14/22 0909   Wound Healing % 0 06/14/22 0909   Post-Procedure Length (cm) 6.5 cm 06/14/22 0921   Post-Procedure Width (cm) 1.5 cm 06/14/22 0921   Post-Procedure Depth (cm) 0.2 cm 06/14/22 0921   Post-Procedure Surface Area (cm^2) 9.75 cm^2 06/14/22 0921   Post-Procedure Volume (cm^3) 1.95 cm^3 06/14/22 0921   Distance Tunneling (cm) 0 cm 06/10/22 0930   Tunneling Position ___ O'Clock 0 06/10/22 0930   Undermining Starts ___ O'Clock 0 06/10/22 0930   Undermining Ends___ O'Clock 0 06/10/22 0930   Undermining Maxium Distance (cm) 0 06/10/22 0930   Wound Assessment Pink/red;Slough 06/14/22 0909   Drainage Amount Moderate 06/14/22 0909   Drainage Description Serosanguinous 06/14/22 0909   Odor None 06/14/22 0909 Reyna-wound Assessment Maceration 06/14/22 0909   Margins Defined edges 06/14/22 0909   Wound Thickness Description not for Pressure Injury Full thickness 06/14/22 0909   Number of days: 34     Percent of Wound(s) Debrided: approximately 100%    Total  Area  Debrided: 9.75 sq cm     Bleeding:  Minimal    Hemostasis Achieved:  by pressure    Procedural Pain:  0  / 10     Post Procedural Pain:  0 / 10     Response to treatment:  Well tolerated by patient. Status of wound progress and description from last visit: Slightly larger today, using Anasept to minimize bio-burden and collagen to build tissue along with NPWT and will use an ace wrap for light compression. The patient is back to work. Plan:       Discharge Instructions       PHYSICIAN ORDERS AND DISCHARGE INSTRUCTIONS     NOTE: Upon discharge from the [address], you will receive a patient experience survey. We would be grateful if you would take the time to fill this survey out.     Wound care order history:                 KERI's   Right  0.56     Left 0.5              Date: 5/10/22              Cultures:                 Grafts:                Antibiotics:           Continuing wound care orders and information:                 Residence:                Continue home health care with:               Your wound-care supplies will be provided by:      Wound cleansing:               KF not scrub or use excessive force.              Wash hands with soap and water before and after dressing changes.               NHPBH to applying a clean dressing, cleanse wound with normal saline, wound cleanser, or mild soap and water.               Ask the physician or nurse before getting the wound(s) wet in a shower     Daily Wound management:              Keep weight off wounds and reposition every 2 hours.              Avoid standing for long periods of time.              Apply wraps/stockings in AM and remove at bedtime.              If swelling is present, elevate

## 2022-06-17 ENCOUNTER — HOSPITAL ENCOUNTER (OUTPATIENT)
Dept: WOUND CARE | Age: 52
Discharge: HOME | End: 2022-06-17
Payer: COMMERCIAL

## 2022-06-17 VITALS
RESPIRATION RATE: 18 BRPM | HEART RATE: 96 BPM | SYSTOLIC BLOOD PRESSURE: 154 MMHG | DIASTOLIC BLOOD PRESSURE: 93 MMHG | TEMPERATURE: 97.8 F

## 2022-06-17 PROCEDURE — 97605 NEG PRS WND THER DME<=50SQCM: CPT

## 2022-06-17 ASSESSMENT — PAIN SCALES - GENERAL: PAINLEVEL_OUTOF10: 0

## 2022-06-17 NOTE — PROGRESS NOTES
Negative Pressure    NAME:  Adrianna Ventura  YOB: 1970  MEDICAL RECORD NUMBER:  [id number]  DATE:  6/17/2022     Applied Negative Pressure to left lateral foot.  [x] Applied skin barrier prep to ilene-wound.  [x] Cut strips of plastic drape to picture frame wound so that ilene-wound is     covered with the drape.  [x] If bridging dressing to less prominent site, cover any intact skin that will come in contact with the Negative Pressure Therapy sponge, gauze or channel drain with plastic drape. The sponge should never touch intact skin.  [x] Cut sponge, gauze or channel drain to size which will fit into the wound/ulcer bed without being forced.  [x] Be sure the sponge is large enough to hold the entire round plastic flange which is attached to the tubing. Never allow flange to be larger than the sponge or it will produce suction damaging intact skin.  Total number of individual pieces of foam used within the wound bed: 1     [x] If bridging the dressing away from the primary site, be sure the bridge leads to a piece of sponge large enough to hold the entire flange without allowing any of the flange to overlap onto intact skin.  [x] Covered sponge, gauze or channel drain with plastic drape.  [x] Cut a hole in this plastic drape directly over the sponge the same size as the plastic drain tubing.  [x] Removed plastic liner from flange and apply it directly over the hole you cut.  [x] Removed the plastic cover from the flange.  [x] Attached the tubing to the wound/ulcer Negative Pressure Therapy and turn it on to be sure a vacuum is created and that there are no leaks.  [x] If air leaks occur, use plastic drape to patch them.  [x] Secured Negative Pressure Therapy dressing with ace wrap loosely if located on an extremity. Maintain tubing outside of ace wrap. Tubing must not exert pressure on intact skin.     Applied per  Guidelines      Electronically signed by Charanjit Celis RN on 6/17/2022 at 9:25 AM

## 2022-06-21 ENCOUNTER — HOSPITAL ENCOUNTER (OUTPATIENT)
Dept: WOUND CARE | Age: 52
Discharge: HOME | End: 2022-06-21
Payer: COMMERCIAL

## 2022-06-21 VITALS — SYSTOLIC BLOOD PRESSURE: 158 MMHG | TEMPERATURE: 98 F | DIASTOLIC BLOOD PRESSURE: 88 MMHG

## 2022-06-21 DIAGNOSIS — T14.8XXA NONHEALING NONSURGICAL WOUND WITH FAT LAYER EXPOSED: Primary | ICD-10-CM

## 2022-06-21 DIAGNOSIS — L97.322 SKIN ULCER OF LEFT ANKLE WITH FAT LAYER EXPOSED (HCC): ICD-10-CM

## 2022-06-21 PROCEDURE — 97605 NEG PRS WND THER DME<=50SQCM: CPT

## 2022-06-21 PROCEDURE — 11042 DBRDMT SUBQ TIS 1ST 20SQCM/<: CPT | Performed by: NURSE PRACTITIONER

## 2022-06-21 PROCEDURE — 11042 DBRDMT SUBQ TIS 1ST 20SQCM/<: CPT

## 2022-06-21 RX ORDER — LIDOCAINE 40 MG/G
CREAM TOPICAL ONCE
Status: CANCELLED | OUTPATIENT
Start: 2022-06-21 | End: 2022-06-21

## 2022-06-21 RX ORDER — BACITRACIN, NEOMYCIN, POLYMYXIN B 400; 3.5; 5 [USP'U]/G; MG/G; [USP'U]/G
OINTMENT TOPICAL ONCE
Status: CANCELLED | OUTPATIENT
Start: 2022-06-21 | End: 2022-06-21

## 2022-06-21 RX ORDER — LIDOCAINE HYDROCHLORIDE 40 MG/ML
SOLUTION TOPICAL ONCE
Status: CANCELLED | OUTPATIENT
Start: 2022-06-21 | End: 2022-06-21

## 2022-06-21 RX ORDER — GINSENG 100 MG
CAPSULE ORAL ONCE
Status: CANCELLED | OUTPATIENT
Start: 2022-06-21 | End: 2022-06-21

## 2022-06-21 RX ORDER — LIDOCAINE 50 MG/G
OINTMENT TOPICAL ONCE
Status: CANCELLED | OUTPATIENT
Start: 2022-06-21 | End: 2022-06-21

## 2022-06-21 RX ORDER — BETAMETHASONE DIPROPIONATE 0.05 %
OINTMENT (GRAM) TOPICAL ONCE
Status: CANCELLED | OUTPATIENT
Start: 2022-06-21 | End: 2022-06-21

## 2022-06-21 RX ORDER — GENTAMICIN SULFATE 1 MG/G
OINTMENT TOPICAL ONCE
Status: CANCELLED | OUTPATIENT
Start: 2022-06-21 | End: 2022-06-21

## 2022-06-21 RX ORDER — CLOBETASOL PROPIONATE 0.5 MG/G
OINTMENT TOPICAL ONCE
Status: CANCELLED | OUTPATIENT
Start: 2022-06-21 | End: 2022-06-21

## 2022-06-21 RX ORDER — BACITRACIN ZINC AND POLYMYXIN B SULFATE 500; 1000 [USP'U]/G; [USP'U]/G
OINTMENT TOPICAL ONCE
Status: CANCELLED | OUTPATIENT
Start: 2022-06-21 | End: 2022-06-21

## 2022-06-21 NOTE — PROGRESS NOTES
Wound Care Center Progress Note With Procedure    Rafael Cordova  AGE: 50 y.o. GENDER: male  : 1970  EPISODE DATE:  2022     Subjective:     Chief Complaint   Patient presents with    Wound Check     left leg         HISTORY of PRESENT ILLNESS     Sara Keller a 54 y.o. 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 The condition is of moderate severity. The ulcer has been present since 22 post left ankle hardware removal (related to pain) per Dr. Delfino Orozco initially had left ankle open reduction internal fixation left ankle per Dr. Evone Sacks 21. The underlying cause is thought to be nonhealing surgical.  The patients care to date has included evaluation and follow per Dr. Henning November. The patient has significant underlying medical conditions as below. Patient was slightly larger last week, but he is happy with his progress and his measurements are down today.   Taking a long time to heal even with NPT, but wound is clean with good granulating tissue present     Wound Pain Timing/Severity: intermittent, mild  Quality of pain: dull, tender  Severity of pain:  2 / 10   Modifying Factors: edema, venous stasis, obesity and smoking  Associated Signs/Symptoms: edema, erythema, drainage and pain        PAST MEDICAL HISTORY        Diagnosis Date    Fracture     in ER 2021 fx left ankle- for surg 2021    History of alcohol abuse     Pericarditis     \"in 2015\" no longer forllow with cardiologist    Sleep apnea     had sleep study 2014- uses cpap       PAST SURGICAL HISTORY    Past Surgical History:   Procedure Laterality Date    ANKLE FRACTURE SURGERY Left 2021    LEFT ANKLE OPEN REDUCTION INTERNAL FIXATION performed by Aggie Bowen MD at [address] [place] [address] Left 2022    LEFT ANKLE HARDWARE REMOVAL performed by Aggie Bowen MD at [address] [place] [address]    Family History Problem Relation Age of Onset    Breast Cancer Mother     Cancer Father         lung cancer       SOCIAL HISTORY    Social History     Tobacco Use    Smoking status: Current Every Day Smoker     Packs/day: 1.50     Years: 25.00     Pack years: 37.50     Types: Cigarettes    Smokeless tobacco: Never Used    Tobacco comment: instructed adverse effects   Vaping Use    Vaping Use: Never used   Substance Use Topics    Alcohol use: Yes     Comment: average\" 5-6 beers per day\"    Drug use: Not Currently     Types: Marijuana Almaz Kales)     Comment: \"20+ yrs ago \"       ALLERGIES    No Known Allergies    MEDICATIONS    Current Outpatient Medications on File Prior to Encounter   Medication Sig Dispense Refill    aspirin 325 MG EC tablet Take 1 tablet by mouth daily 30 tablet 0     No current facility-administered medications on file prior to encounter. REVIEW OF SYSTEMS    Pertinent items are noted in HPI. Constitutional: Negative for systemic symptoms including fever, chills and malaise. Objective:      BP (!) 158/88   Temp 98 °F (36.7 °C) (Temporal)     PHYSICAL EXAM      General: The patient is in no acute distress. Mental status:  Patient is appropriate, is  oriented to place and plan of care. Dermatologic exam: Visual inspection of the periwound reveals the skin to be normal in turgor and texture and dry  Wound exam: see wound description below in procedure note      Assessment:     Problem List Items Addressed This Visit        Other    WD-Nonhealing nonsurgical wound with fat layer exposed - Primary    Relevant Orders    Initiate Outpatient Wound Care Protocol    Neg. Pressure Wound Therapy    WD-Skin ulcer of ankle with fat layer exposed ([facility] [address]    Relevant Orders    Initiate Outpatient Wound Care Protocol    Neg.  Pressure Wound Therapy        Procedure Note    Indications:  Based on my examination of this patient's wound(s) today, sharp excision into necrotic subcutaneous tissue is required to promote healing and evaluate the extent of previous healing. Performed by: KISHA Diaz CNP    Consent obtained: Yes    Time out taken:  Yes    Pain Control: N/A       Debridement:Excisional Debridement    Using #15 blade scalpel the wound(s) was/were sharply debrided down through and including the removal of subcutaneous tissue. Devitalized Tissue Debrided:  fibrin, biofilm, slough and exudate    Pre Debridement Measurements:  Are located in the Wound Documentation Flow Sheet    All active wounds listed below with today's date are evaluated  Wound(s)    debrided this date include # : 1     Post  Debridement Measurements:  Negative Pressure Wound Therapy Leg Anterior;Left;Lower (Active)   Wound Type Surgical 06/17/22 0914   Unit Type KCI 06/17/22 0914   Dressing Type Black Foam 06/17/22 0914   Number of pieces used 1 06/17/22 0914   Number of pieces removed 1 06/10/22 0930   Cycle Continuous 06/17/22 0914   Target Pressure (mmHg) 125 06/17/22 0914   Canister changed?  No 06/17/22 0914   Dressing Status New dressing applied 06/17/22 0914   Dressing Changed Changed/New 06/17/22 0914   Drainage Amount Moderate 06/17/22 0914   Drainage Description Serosanguinous 06/17/22 0914   Dressing Change Due 06/09/22 06/07/22 1026   Shape irregular 06/07/22 1026   Odor None 06/07/22 1026   Number of days: 24       Wound 05/10/22 Pretibial Left;Lateral (Active)   Wound Image   06/21/22 0914   Wound Etiology Non-Healing Surgical 06/21/22 0914   Dressing Status New dressing applied 06/17/22 0914   Wound Cleansed Wound cleanser 06/21/22 0914   Offloading for Diabetic Foot Ulcers Offloading boot 06/21/22 0914   Wound Length (cm) 6.6 cm 06/21/22 0914   Wound Width (cm) 1.2 cm 06/21/22 0914   Wound Depth (cm) 0.2 cm 06/21/22 0914   Wound Surface Area (cm^2) 7.92 cm^2 06/21/22 0914   Change in Wound Size % (l*w) 18.77 06/21/22 0914   Wound Volume (cm^3) 1.584 cm^3 06/21/22 0914   Wound Healing % 19 06/21/22 0914 Post-Procedure Length (cm) 6.6 cm 06/21/22 0939   Post-Procedure Width (cm) 1.2 cm 06/21/22 0939   Post-Procedure Depth (cm) 0.2 cm 06/21/22 0939   Post-Procedure Surface Area (cm^2) 7.92 cm^2 06/21/22 0939   Post-Procedure Volume (cm^3) 1.584 cm^3 06/21/22 0939   Distance Tunneling (cm) 0 cm 06/21/22 0914   Tunneling Position ___ O'Clock 0 06/21/22 0914   Undermining Starts ___ O'Clock 0 06/21/22 0914   Undermining Ends___ O'Clock 0 06/21/22 0914   Undermining Maxium Distance (cm) 0 06/21/22 0914   Wound Assessment Pink/red 06/21/22 0914   Drainage Amount Moderate 06/21/22 0914   Drainage Description Serosanguinous 06/21/22 0914   Odor None 06/21/22 0914   Reyna-wound Assessment Maceration 06/21/22 0914   Margins Defined edges 06/21/22 0914   Wound Thickness Description not for Pressure Injury Full thickness 06/21/22 0914   Number of days: 42       Percent of Wound(s) Debrided: approximately 100%    Total  Area  Debrided:  7.92 sq cm     Bleeding:  Minimal    Hemostasis Achieved:  by pressure    Procedural Pain:  4  / 10     Post Procedural Pain:  0 / 10     Response to treatment:  Well tolerated by patient. Status of wound progress and description from last visit:   Improved in size this week. Continue plan of care for now and follow up 1 week  Continue to monitor       Plan:       Discharge Instructions       [address]     NOTE: Upon discharge from the [address], you will receive a patient experience survey.  We would be grateful if you would take the time to fill this survey out.     Wound care order history:                 KERI's   Right  0.56     Left 0.5              Date: 5/10/22              Cultures:                 Grafts:                Antibiotics:           Continuing wound care orders and information:                 Residence:                Continue home health care with:               Your wound-care supplies will be provided by:      Wound cleansing:             CQ not scrub or use excessive force.              Wash hands with soap and water before and after dressing changes.             UWTFL to applying a clean dressing, cleanse wound with normal saline, wound cleanser, or mild soap and water.               Ask the physician or nurse before getting the wound(s) wet in a shower     Daily Wound management:              Keep weight off wounds and reposition every 2 hours.              Avoid standing for long periods of time.              Apply wraps/stockings in AM and remove at bedtime.              If swelling is present, elevate legs to the level of the heart or above for 30 minutes 4-5 times a day and/or when sitting.                                      When taking antibiotics take entire prescription as ordered by physician do not stop taking until medicine is all gone.                                                           Orders for this week: 6/21/22        Culture of Left Lateral Ankle taken on 5/10/22.        Rx: Drug [facility] in [place]        (((Do not increase Coban 2 Lite to full strength due to low ABIs)))        Applied for wound vac on 5/10/22, once available start applying in clinic.     WOUND VAC THERAPY: Left Lateral Ankle     DUODERM TO PERIWOUND FOR PROTECTION. APPLY Anasept, Stimulen and BLACK FOAM TO WOUND. SECURE VAC DRESSING WITH DRAPE.     SET WOUND VAC  CONTINUOUS SUCTION. CANISTER CHANGE WEEKLY OR ACCORDING TO VOLUME OF DRAINAGE.     WRAP ACE AROUND FOOT AND ANKLE (do not wrap tubing against skin).     WOUND VAC DRESSING TO BE CHANGED Mil[place].          Nurse visits on Fridays to change Wound Vac. Follow up with Estuardo Hawk CNP in 1 week in the wound care center. Call [phone] for any questions or concerns.   Date__________   Time___________        Treatment Note      Written Patient Dismissal Instructions Given            Electronically signed by KISHA Todd CNP on 6/21/2022 at 9:41 AM

## 2022-06-21 NOTE — PROGRESS NOTES
Negative Pressure    NAME:  Jay Brooks  YOB: 1970  MEDICAL RECORD NUMBER:  [id number]  DATE:  6/21/2022     Applied Negative Pressure to 1 wound(s)/ulcer(s).  [x] Applied skin barrier prep to ilene-wound.  [x] Cut strips of plastic drape to picture frame wound so that ilene-wound is     covered with the drape.  [x] If bridging dressing to less prominent site, cover any intact skin that will come in contact with the Negative Pressure Therapy sponge, gauze or channel drain with plastic drape. The sponge should never touch intact skin.  [x] Cut sponge, gauze or channel drain to size which will fit into the wound/ulcer bed without being forced.  [x] Be sure the sponge is large enough to hold the entire round plastic flange which is attached to the tubing. Never allow flange to be larger than the sponge or it will produce suction damaging intact skin.  Total number of individual pieces of foam used within the wound bed: 1     [x] If bridging the dressing away from the primary site, be sure the bridge leads to a piece of sponge large enough to hold the entire flange without allowing any of the flange to overlap onto intact skin.  [x] Covered sponge, gauze or channel drain with plastic drape.  [x] Cut a hole in this plastic drape directly over the sponge the same size as the plastic drain tubing.  [x] Removed plastic liner from flange and apply it directly over the hole you cut.  [x] Removed the plastic cover from the flange.  [x] Attached the tubing to the wound/ulcer Negative Pressure Therapy and turn it on to be sure a vacuum is created and that there are no leaks.  [x] If air leaks occur, use plastic drape to patch them.  [x] Secured Negative Pressure Therapy dressing with ace wrap loosely if located on an extremity. Maintain tubing outside of ace wrap. Tubing must not exert pressure on intact skin.     Applied per  Guidelines      Electronically signed by Fran Saez RN on 6/21/2022 at 10:25 AM

## 2022-06-24 ENCOUNTER — HOSPITAL ENCOUNTER (OUTPATIENT)
Dept: WOUND CARE | Age: 52
Discharge: HOME | End: 2022-06-24
Payer: COMMERCIAL

## 2022-06-24 VITALS
HEART RATE: 81 BPM | DIASTOLIC BLOOD PRESSURE: 94 MMHG | RESPIRATION RATE: 18 BRPM | SYSTOLIC BLOOD PRESSURE: 165 MMHG | TEMPERATURE: 97.1 F

## 2022-06-24 PROCEDURE — 97605 NEG PRS WND THER DME<=50SQCM: CPT

## 2022-06-24 ASSESSMENT — PAIN SCALES - GENERAL: PAINLEVEL_OUTOF10: 0

## 2022-06-24 NOTE — PROGRESS NOTES
Negative Pressure Wound Therapy    NAME:  Nat Gordon  YOB: 1970  MEDICAL RECORD NUMBER:  [id number]  DATE:  6/24/2022     Applied Negative Pressure to  ankle wound(s)/ulcer(s).  [x] Applied skin barrier prep to ilene-wound.  [x] Cut strips of plastic drape to picture frame wound so that ilene-wound is     covered with the drape.  [x] If bridging dressing to less prominent site, cover any intact skin that will come in contact with the Negative Pressure Therapy sponge, gauze or channel drain with plastic drape. The sponge should never touch intact skin.  [x] Cut sponge, gauze or channel drain to size which will fit into the wound/ulcer bed without being forced.  [x] Be sure the sponge is large enough to hold the entire round plastic flange which is attached to the tubing. Never allow flange to be larger than the sponge or it will produce suction damaging intact skin.  Total number of individual pieces of foam used within the wound bed: 1     [x] If bridging the dressing away from the primary site, be sure the bridge leads to a piece of sponge large enough to hold the entire flange without allowing any of the flange to overlap onto intact skin.  [x] Covered sponge, gauze or channel drain with plastic drape.  [x] Cut a hole in this plastic drape directly over the sponge the same size as the plastic drain tubing.  [x] Removed plastic liner from flange and apply it directly over the hole you cut.  [x] Removed the plastic cover from the flange.  [x] Attached the tubing to the wound/ulcer Negative Pressure Therapy and turn it on to be sure a vacuum is created and that there are no leaks.  [x] If air leaks occur, use plastic drape to patch them.  [x] Secured Negative Pressure Therapy dressing with ace wrap loosely if located on an extremity. Maintain tubing outside of ace wrap. Tubing must not exert pressure on intact skin.     Applied per [facility] Guidelines      Electronically signed by Jorge Cisneros LPN on 7/92/1673 at 1:58 AM

## 2022-06-28 ENCOUNTER — HOSPITAL ENCOUNTER (OUTPATIENT)
Dept: WOUND CARE | Age: 52
Discharge: HOME | End: 2022-06-28
Payer: COMMERCIAL

## 2022-06-28 VITALS
SYSTOLIC BLOOD PRESSURE: 160 MMHG | HEART RATE: 92 BPM | TEMPERATURE: 97.9 F | DIASTOLIC BLOOD PRESSURE: 63 MMHG | RESPIRATION RATE: 18 BRPM

## 2022-06-28 DIAGNOSIS — L97.322 SKIN ULCER OF LEFT ANKLE WITH FAT LAYER EXPOSED (HCC): ICD-10-CM

## 2022-06-28 DIAGNOSIS — T14.8XXA NONHEALING NONSURGICAL WOUND WITH FAT LAYER EXPOSED: Primary | ICD-10-CM

## 2022-06-28 PROCEDURE — 11042 DBRDMT SUBQ TIS 1ST 20SQCM/<: CPT

## 2022-06-28 PROCEDURE — 11042 DBRDMT SUBQ TIS 1ST 20SQCM/<: CPT | Performed by: NURSE PRACTITIONER

## 2022-06-28 RX ORDER — BETAMETHASONE DIPROPIONATE 0.05 %
OINTMENT (GRAM) TOPICAL ONCE
Status: CANCELLED | OUTPATIENT
Start: 2022-06-28 | End: 2022-06-28

## 2022-06-28 RX ORDER — BACITRACIN ZINC AND POLYMYXIN B SULFATE 500; 1000 [USP'U]/G; [USP'U]/G
OINTMENT TOPICAL ONCE
Status: CANCELLED | OUTPATIENT
Start: 2022-06-28 | End: 2022-06-28

## 2022-06-28 RX ORDER — GENTAMICIN SULFATE 1 MG/G
OINTMENT TOPICAL ONCE
Status: CANCELLED | OUTPATIENT
Start: 2022-06-28 | End: 2022-06-28

## 2022-06-28 RX ORDER — LIDOCAINE 50 MG/G
OINTMENT TOPICAL ONCE
Status: CANCELLED | OUTPATIENT
Start: 2022-06-28 | End: 2022-06-28

## 2022-06-28 RX ORDER — CLOBETASOL PROPIONATE 0.5 MG/G
OINTMENT TOPICAL ONCE
Status: CANCELLED | OUTPATIENT
Start: 2022-06-28 | End: 2022-06-28

## 2022-06-28 RX ORDER — GINSENG 100 MG
CAPSULE ORAL ONCE
Status: CANCELLED | OUTPATIENT
Start: 2022-06-28 | End: 2022-06-28

## 2022-06-28 RX ORDER — BACITRACIN, NEOMYCIN, POLYMYXIN B 400; 3.5; 5 [USP'U]/G; MG/G; [USP'U]/G
OINTMENT TOPICAL ONCE
Status: CANCELLED | OUTPATIENT
Start: 2022-06-28 | End: 2022-06-28

## 2022-06-28 RX ORDER — LIDOCAINE HYDROCHLORIDE 40 MG/ML
SOLUTION TOPICAL ONCE
Status: CANCELLED | OUTPATIENT
Start: 2022-06-28 | End: 2022-06-28

## 2022-06-28 RX ORDER — LIDOCAINE 40 MG/G
CREAM TOPICAL ONCE
Status: CANCELLED | OUTPATIENT
Start: 2022-06-28 | End: 2022-06-28

## 2022-06-28 ASSESSMENT — PAIN SCALES - GENERAL: PAINLEVEL_OUTOF10: 0

## 2022-06-28 NOTE — PROGRESS NOTES
Wound Care Center Progress Note With Procedure    Raymond Cordova  AGE: 50 y.o. GENDER: male  : 1970  EPISODE DATE:  2022     Subjective:     Chief Complaint   Patient presents with    Wound Check     ankle left         HISTORY of PRESENT ILLNESS   Raymond Cordova is a 54 y.o. male who presents to the [address] [place] [address] for evaluation and treatment of Chronic non-healing surgical  ulcer(s) of the left lateral ankle. The condition is of moderate severity. The ulcer has been present since 22 post left ankle hardware removal (related to pain) per Dr. Gosia Dey. He initially had left ankle open reduction internal fixation left ankle per Dr. Gosia Dey 21. The underlying cause is thought to be nonhealing surgical.  The patients care to date has included evaluation and follow per Dr. Brian Murphy. The patient has significant underlying medical conditions as below.      Wound Pain Timing/Severity: intermittent, mild  Quality of pain: dull, tender  Severity of pain:  2 / 10   Modifying Factors: edema, venous stasis, obesity and smoking  Associated Signs/Symptoms: edema, erythema, drainage and pain     KERI: Right 0.56, left 0.5 as of 5/10/22     Arterial evaluation: based on wound; locations, assessment and healing progress        Venous Evaluation: based on wound; locations, assessment and healing progress     Wound infection: wound culture obtained 5/10/22     Diabetes: No  Smoking: Yes (approximately 1 pack per day). Patient counseled in length on smoking cessation including benefits of quitting and health risks of continuing to smoke including but not limited to lung cancer, COPD, risk of coronary artery disease. Patient verbalized understanding today, is not ready to quit. Anticoagulant/Antiplatelet therapy: Low dose asprin  Immunosuppression: No  Obesity: Yes  Nutritional status:  well nourished.  Discussed need for increased protein and calories for wound healing and good sources of protein (just over 7 grams for every 20 pounds of body weight). Animal-based foods high in protein (meat, poultry, fish, eggs, and dairy foods). Plant based foods high in protein (tofu, lentils, beans, chickpeas, nuts, quinoa and abeba seeds. Other History:     Patient educated on the 6 essential components necessary for wound healing: Circulation, Debridements, Proper Dressings and Topical Wound Products, Infection Control, Edema Control and Offloading.     Patient educated on those factors that negatively effect or impact wound healing: smoking, obesity, uncontrolled diabetes, anticoagulant and immunosuppressive regimens, inadequate nutrition, untreated arterial and venous disease if applicable and measures to manage edema.           PAST MEDICAL HISTORY        Diagnosis Date    Fracture     in ER 7/24/2021 fx left ankle- for surg 7/30/2021    History of alcohol abuse     Pericarditis     \"in 2015\" no longer forllow with cardiologist    Sleep apnea     had sleep study 2014- uses cpap       PAST SURGICAL HISTORY    Past Surgical History:   Procedure Laterality Date    ANKLE FRACTURE SURGERY Left 7/30/2021    LEFT ANKLE OPEN REDUCTION INTERNAL FIXATION performed by Wojciech Marcos MD at [facility] Left 1/19/2022    LEFT ANKLE HARDWARE REMOVAL performed by Wojciech Marcos MD at [address] [place] [address]    Family History   Problem Relation Age of Onset    Breast Cancer Mother     Cancer Father         lung cancer       SOCIAL HISTORY    Social History     Tobacco Use    Smoking status: Current Every Day Smoker     Packs/day: 1.50     Years: 25.00     Pack years: 37.50     Types: Cigarettes    Smokeless tobacco: Never Used    Tobacco comment: instructed adverse effects   Vaping Use    Vaping Use: Never used   Substance Use Topics    Alcohol use: Yes     Comment: average\" 5-6 beers per day\"    Drug use: Not Currently     Types: Marijuana Osman Parjose daniel)     Comment: \"20+ yrs ago \"       ALLERGIES    No Known Allergies    MEDICATIONS    Current Outpatient Medications on File Prior to Encounter   Medication Sig Dispense Refill    aspirin 325 MG EC tablet Take 1 tablet by mouth daily 30 tablet 0     No current facility-administered medications on file prior to encounter. REVIEW OF SYSTEMS    Pertinent items are noted in HPI. Constitutional: Negative for systemic symptoms including fever, chills and malaise. Objective:      BP (!) 160/63   Pulse 92   Temp 97.9 °F (36.6 °C) (Temporal)   Resp 18     PHYSICAL EXAM    General: The patient is in no acute distress. Mental status:  Patient is appropriate, is  oriented to place and plan of care. Dermatologic exam: Visual inspection of the periwound reveals the skin to be edematous  Wound exam: see wound description below in procedure note      Assessment:     Problem List Items Addressed This Visit        Other    WD-Nonhealing nonsurgical wound with fat layer exposed - Primary    Relevant Orders    Initiate Outpatient Wound Care Protocol    Neg. Pressure Wound Therapy    WD-Skin ulcer of ankle with fat layer exposed ([address]    Relevant Orders    Initiate Outpatient Wound Care Protocol    Neg. Pressure Wound Therapy        Procedure Note    Indications:  Based on my examination of this patient's wound(s) today, sharp excision into necrotic subcutaneous tissue is required to promote healing and evaluate the extent of previous healing. Performed by: KISHA Arreola CNP    Consent obtained: Yes    Time out taken:  Yes    Pain Control:       Debridement:Excisional Debridement    Using curette the wound(s) was/were sharply debrided down through and including the removal of subcutaneous tissue.         Devitalized Tissue Debrided:  slough and exudate    Pre Debridement Measurements:  Are located in the Wound Documentation Flow Sheet    All active wounds listed below with today's date are evaluated  Wound(s)    debrided this date include # : 1     Post Debridement Measurements:  Negative Pressure Wound Therapy Leg Anterior;Left;Lower (Active)   Wound Type Surgical 06/28/22 0952   Unit Type KCI  06/28/22 0952   Dressing Type Black Foam 06/28/22 0952   Number of pieces used 1 06/28/22 0952   Number of pieces removed 1 06/28/22 0916   Cycle Continuous 06/28/22 0952   Target Pressure (mmHg) 125 06/28/22 0952   Canister changed?  Yes 06/28/22 0952   Dressing Status New dressing applied 06/24/22 0912   Dressing Changed Changed/New 06/24/22 0912   Drainage Amount Small 06/24/22 0912   Drainage Description Serosanguinous 06/24/22 0912   Dressing Change Due 06/30/22 06/28/22 0952   Shape irregular 06/24/22 0912   Odor None 06/24/22 0912   Number of days: 32       Wound 05/10/22 Pretibial Left;Lateral (Active)   Wound Image   06/21/22 0914   Wound Etiology Non-Healing Surgical 06/28/22 0916   Dressing Status New dressing applied 06/24/22 0912   Wound Cleansed Wound cleanser 06/28/22 0916   Offloading for Diabetic Foot Ulcers Offloading boot 06/28/22 0916   Wound Length (cm) 5.4 cm 06/28/22 0916   Wound Width (cm) 1 cm 06/28/22 0916   Wound Depth (cm) 0.2 cm 06/28/22 0916   Wound Surface Area (cm^2) 5.4 cm^2 06/28/22 0916   Change in Wound Size % (l*w) 44.62 06/28/22 0916   Wound Volume (cm^3) 1.08 cm^3 06/28/22 0916   Wound Healing % 45 06/28/22 0916   Post-Procedure Length (cm) 5.4 cm 06/28/22 0926   Post-Procedure Width (cm) 1 cm 06/28/22 0926   Post-Procedure Depth (cm) 0.2 cm 06/28/22 0926   Post-Procedure Surface Area (cm^2) 5.4 cm^2 06/28/22 0926   Post-Procedure Volume (cm^3) 1.08 cm^3 06/28/22 0926   Distance Tunneling (cm) 0 cm 06/28/22 0916   Tunneling Position ___ O'Clock 0 06/28/22 0916   Undermining Starts ___ O'Clock 0 06/28/22 0916   Undermining Ends___ O'Clock 0 06/28/22 0916   Undermining Maxium Distance (cm) 0 06/28/22 0916   Wound Assessment Slough;Lenora/red 06/28/22 0916   Drainage Amount Small 06/28/22 0916   Drainage Description Serosanguinous 06/28/22 6378   Odor None 06/28/22 0916   Reyna-wound Assessment Intact; Maceration 06/28/22 0916   Margins Defined edges 06/28/22 0916   Wound Thickness Description not for Pressure Injury Full thickness 06/28/22 0916   Number of days: 49       Percent of Wound(s) Debrided: approximately 100%    Total  Area  Debrided: 5.4 sq cm     Bleeding:  Minimal    Hemostasis Achieved:  by pressure    Procedural Pain:  0  / 10     Post Procedural Pain:  0 / 10     Response to treatment:  Well tolerated by patient. Status of wound progress and description from last visit: Slightly larger today, using Anasept to minimize bio-burden and collagen to build tissue along with NPWT and will use an ace wrap for light compression. The patient is back to work. Plan:       Discharge Instructions       PHYSICIAN ORDERS AND DISCHARGE INSTRUCTIONS     NOTE: Upon discharge from the [address], you will receive a patient experience survey. We would be grateful if you would take the time to fill this survey out.     Wound care order history:                 KERI's   Right  0.56     Left 0.5              Date: 5/10/22              Cultures:                 Grafts:                Antibiotics:           Continuing wound care orders and information:                 Residence:                Continue home health care with:               Your wound-care supplies will be provided by:      Wound cleansing:               NP not scrub or use excessive force.              Wash hands with soap and water before and after dressing changes.               VSVHY to applying a clean dressing, cleanse wound with normal saline, wound cleanser, or mild soap and water.               Ask the physician or nurse before getting the wound(s) wet in a shower     Daily Wound management:              Keep weight off wounds and reposition every 2 hours.              Avoid standing for long periods of time.              Apply wraps/stockings in AM and remove at bedtime.              If swelling is present, elevate legs to the level of the heart or above for 30 minutes 4-5 times a day and/or when sitting.                                      When taking antibiotics take entire prescription as ordered by physician do not stop taking until medicine is all gone.                                                           Orders for this week: 6/28/22        Culture of Left Lateral Ankle taken on 5/10/22.        Rx: [facility] in [place]        (((Do not increase Coban 2 Lite to full strength due to low ABIs)))        Applied for wound vac on 5/10/22, once available start applying in clinic.     WOUND VAC THERAPY: Left Lateral Ankle     DUODERM TO PERIWOUND FOR PROTECTION. APPLY Anasept, Stimulen and BLACK FOAM TO WOUND. SECURE VAC DRESSING WITH DRAPE.     SET WOUND VAC  CONTINUOUS SUCTION. CANISTER CHANGE WEEKLY OR ACCORDING TO VOLUME OF DRAINAGE.     WRAP ACE AROUND FOOT AND ANKLE (do not wrap tubing against skin).     WOUND VAC DRESSING TO BE CHANGED Milo.          Nurse visits on Fridays to change Wound Vac. Follow up with Orlando Blue CNP in 1 week in the wound care center. Call [phone] for any questions or concerns.   Date__________   Time___________        Treatment Note Wound 05/10/22 Pretibial Left;Lateral-Dressing/Treatment:  (dudoderm stimulen anasept black foam x1 drape vac ace wrap)    Written Patient Dismissal Instructions Given            Electronically signed by KISHA Sandhu CNP on 6/28/2022 at 1:24 PM

## 2022-06-28 NOTE — PROGRESS NOTES
Negative Pressure    NAME:  Cesia Gonzalez  YOB: 1970  MEDICAL RECORD NUMBER:  [id number]  DATE:  6/28/2022     Applied Negative Pressure to LEFT LATERAL LEG wound(s)/ulcer(s).  [x] Applied skin barrier prep to ilene-wound.  [x] Cut strips of plastic drape to picture frame wound so that ilene-wound is     covered with the drape.  [x] If bridging dressing to less prominent site, cover any intact skin that will come in contact with the Negative Pressure Therapy sponge, gauze or channel drain with plastic drape. The sponge should never touch intact skin.  [x] Cut sponge, gauze or channel drain to size which will fit into the wound/ulcer bed without being forced.  [x] Be sure the sponge is large enough to hold the entire round plastic flange which is attached to the tubing. Never allow flange to be larger than the sponge or it will produce suction damaging intact skin.  Total number of individual pieces of foam used within the wound bed: 1     [x] If bridging the dressing away from the primary site, be sure the bridge leads to a piece of sponge large enough to hold the entire flange without allowing any of the flange to overlap onto intact skin.  [x] Covered sponge, gauze or channel drain with plastic drape.  [x] Cut a hole in this plastic drape directly over the sponge the same size as the plastic drain tubing.  [x] Removed plastic liner from flange and apply it directly over the hole you cut.  [x] Removed the plastic cover from the flange.  [x] Attached the tubing to the wound/ulcer Negative Pressure Therapy and turn it on to be sure a vacuum is created and that there are no leaks.  [x] If air leaks occur, use plastic drape to patch them.  [x] Secured Negative Pressure Therapy dressing with ace wrap loosely if located on an extremity. Maintain tubing outside of ace wrap. Tubing must not exert pressure on intact skin.     Applied per Yuridia Yuan Guidelines      Electronically signed by Jeanie Schwarz RN on 6/28/2022 at 9:57 AM

## 2022-07-01 ENCOUNTER — HOSPITAL ENCOUNTER (OUTPATIENT)
Dept: WOUND CARE | Age: 52
Discharge: HOME | End: 2022-07-01
Payer: COMMERCIAL

## 2022-07-01 ENCOUNTER — TELEPHONE (OUTPATIENT)
Dept: WOUND CARE | Age: 52
End: 2022-07-01

## 2022-07-01 VITALS
DIASTOLIC BLOOD PRESSURE: 94 MMHG | RESPIRATION RATE: 16 BRPM | SYSTOLIC BLOOD PRESSURE: 165 MMHG | TEMPERATURE: 97.4 F | HEART RATE: 90 BPM

## 2022-07-01 DIAGNOSIS — L97.322 SKIN ULCER OF LEFT ANKLE WITH FAT LAYER EXPOSED (HCC): ICD-10-CM

## 2022-07-01 DIAGNOSIS — T14.8XXA NONHEALING NONSURGICAL WOUND WITH FAT LAYER EXPOSED: Primary | ICD-10-CM

## 2022-07-01 PROCEDURE — 97605 NEG PRS WND THER DME<=50SQCM: CPT

## 2022-07-01 RX ORDER — BACITRACIN, NEOMYCIN, POLYMYXIN B 400; 3.5; 5 [USP'U]/G; MG/G; [USP'U]/G
OINTMENT TOPICAL ONCE
Status: CANCELLED | OUTPATIENT
Start: 2022-07-01 | End: 2022-07-01

## 2022-07-01 RX ORDER — GENTAMICIN SULFATE 1 MG/G
OINTMENT TOPICAL ONCE
Status: CANCELLED | OUTPATIENT
Start: 2022-07-01 | End: 2022-07-01

## 2022-07-01 RX ORDER — LIDOCAINE HYDROCHLORIDE 40 MG/ML
SOLUTION TOPICAL ONCE
Status: CANCELLED | OUTPATIENT
Start: 2022-07-01 | End: 2022-07-01

## 2022-07-01 RX ORDER — GINSENG 100 MG
CAPSULE ORAL ONCE
Status: CANCELLED | OUTPATIENT
Start: 2022-07-01 | End: 2022-07-01

## 2022-07-01 RX ORDER — CLOBETASOL PROPIONATE 0.5 MG/G
OINTMENT TOPICAL ONCE
Status: CANCELLED | OUTPATIENT
Start: 2022-07-01 | End: 2022-07-01

## 2022-07-01 RX ORDER — LIDOCAINE 50 MG/G
OINTMENT TOPICAL ONCE
Status: CANCELLED | OUTPATIENT
Start: 2022-07-01 | End: 2022-07-01

## 2022-07-01 RX ORDER — BETAMETHASONE DIPROPIONATE 0.05 %
OINTMENT (GRAM) TOPICAL ONCE
Status: CANCELLED | OUTPATIENT
Start: 2022-07-01 | End: 2022-07-01

## 2022-07-01 RX ORDER — BACITRACIN ZINC AND POLYMYXIN B SULFATE 500; 1000 [USP'U]/G; [USP'U]/G
OINTMENT TOPICAL ONCE
Status: CANCELLED | OUTPATIENT
Start: 2022-07-01 | End: 2022-07-01

## 2022-07-01 RX ORDER — LIDOCAINE 40 MG/G
CREAM TOPICAL ONCE
Status: CANCELLED | OUTPATIENT
Start: 2022-07-01 | End: 2022-07-01

## 2022-07-01 ASSESSMENT — PAIN SCALES - GENERAL: PAINLEVEL_OUTOF10: 0

## 2022-07-01 NOTE — TELEPHONE ENCOUNTER
Small Simplace and Canisters ordered through [facility] on 7/1/22.  Ref # [id number]    Electronically signed by Patrica Alonzo RN on 7/1/2022 at 12:06 PM

## 2022-07-01 NOTE — PROGRESS NOTES
Negative Pressure Wound Therapy    NAME:  Stephania Waddell  YOB: 1970  MEDICAL RECORD NUMBER:  [id number]  DATE:  7/1/2022     Applied Negative Pressure to Left Lateral Ankle wound(s)/ulcer(s).  [x] Applied skin barrier prep to ilene-wound.  [x] Cut strips of plastic drape to picture frame wound so that ilene-wound is     covered with the drape.  [x] If bridging dressing to less prominent site, cover any intact skin that will come in contact with the Negative Pressure Therapy sponge, gauze or channel drain with plastic drape. The sponge should never touch intact skin.  [x] Cut sponge, gauze or channel drain to size which will fit into the wound/ulcer bed without being forced.  [x] Be sure the sponge is large enough to hold the entire round plastic flange which is attached to the tubing. Never allow flange to be larger than the sponge or it will produce suction damaging intact skin.  Total number of individual pieces of foam used within the wound bed: 1     [x] If bridging the dressing away from the primary site, be sure the bridge leads to a piece of sponge large enough to hold the entire flange without allowing any of the flange to overlap onto intact skin.  [x] Covered sponge, gauze or channel drain with plastic drape.  [x] Cut a hole in this plastic drape directly over the sponge the same size as the plastic drain tubing.  [x] Removed plastic liner from flange and apply it directly over the hole you cut.  [x] Removed the plastic cover from the flange.  [x] Attached the tubing to the wound/ulcer Negative Pressure Therapy and turn it on to be sure a vacuum is created and that there are no leaks.  [x] If air leaks occur, use plastic drape to patch them.  [x] Secured Negative Pressure Therapy dressing with ace wrap loosely if located on an extremity. Maintain tubing outside of ace wrap. Tubing must not exert pressure on intact skin.     Applied per Yuridia Yuan Guidelines      Electronically signed by Adrian Wilson RN on 7/1/2022 at 10:38 AM

## 2022-07-05 ENCOUNTER — HOSPITAL ENCOUNTER (OUTPATIENT)
Dept: WOUND CARE | Age: 52
Discharge: HOME | End: 2022-07-05
Payer: COMMERCIAL

## 2022-07-05 DIAGNOSIS — T14.8XXA NONHEALING NONSURGICAL WOUND WITH FAT LAYER EXPOSED: Primary | ICD-10-CM

## 2022-07-05 DIAGNOSIS — L97.322 SKIN ULCER OF LEFT ANKLE WITH FAT LAYER EXPOSED (HCC): ICD-10-CM

## 2022-07-05 PROCEDURE — 11042 DBRDMT SUBQ TIS 1ST 20SQCM/<: CPT

## 2022-07-05 PROCEDURE — 11042 DBRDMT SUBQ TIS 1ST 20SQCM/<: CPT | Performed by: NURSE PRACTITIONER

## 2022-07-05 PROCEDURE — 97605 NEG PRS WND THER DME<=50SQCM: CPT

## 2022-07-05 RX ORDER — LIDOCAINE 50 MG/G
OINTMENT TOPICAL ONCE
Status: CANCELLED | OUTPATIENT
Start: 2022-07-05 | End: 2022-07-05

## 2022-07-05 RX ORDER — CLOBETASOL PROPIONATE 0.5 MG/G
OINTMENT TOPICAL ONCE
Status: CANCELLED | OUTPATIENT
Start: 2022-07-05 | End: 2022-07-05

## 2022-07-05 RX ORDER — BETAMETHASONE DIPROPIONATE 0.05 %
OINTMENT (GRAM) TOPICAL ONCE
Status: CANCELLED | OUTPATIENT
Start: 2022-07-05 | End: 2022-07-05

## 2022-07-05 RX ORDER — BACITRACIN ZINC AND POLYMYXIN B SULFATE 500; 1000 [USP'U]/G; [USP'U]/G
OINTMENT TOPICAL ONCE
Status: CANCELLED | OUTPATIENT
Start: 2022-07-05 | End: 2022-07-05

## 2022-07-05 RX ORDER — BACITRACIN, NEOMYCIN, POLYMYXIN B 400; 3.5; 5 [USP'U]/G; MG/G; [USP'U]/G
OINTMENT TOPICAL ONCE
Status: CANCELLED | OUTPATIENT
Start: 2022-07-05 | End: 2022-07-05

## 2022-07-05 RX ORDER — GINSENG 100 MG
CAPSULE ORAL ONCE
Status: CANCELLED | OUTPATIENT
Start: 2022-07-05 | End: 2022-07-05

## 2022-07-05 RX ORDER — LIDOCAINE 40 MG/G
CREAM TOPICAL ONCE
Status: CANCELLED | OUTPATIENT
Start: 2022-07-05 | End: 2022-07-05

## 2022-07-05 RX ORDER — LIDOCAINE HYDROCHLORIDE 40 MG/ML
SOLUTION TOPICAL ONCE
Status: CANCELLED | OUTPATIENT
Start: 2022-07-05 | End: 2022-07-05

## 2022-07-05 RX ORDER — GENTAMICIN SULFATE 1 MG/G
OINTMENT TOPICAL ONCE
Status: CANCELLED | OUTPATIENT
Start: 2022-07-05 | End: 2022-07-05

## 2022-07-05 NOTE — PROGRESS NOTES
Negative Pressure Wound Therapy    NAME:  Leola Polo  YOB: 1970  MEDICAL RECORD NUMBER:  [id number]  DATE:  7/5/2022     Applied Negative Pressure to left lateral ankle.  [x] Applied skin barrier prep to ilene-wound.  [x] Cut strips of plastic drape to picture frame wound so that ilene-wound is     covered with the drape.  [x] If bridging dressing to less prominent site, cover any intact skin that will come in contact with the Negative Pressure Therapy sponge, gauze or channel drain with plastic drape. The sponge should never touch intact skin.  [x] Cut sponge, gauze or channel drain to size which will fit into the wound/ulcer bed without being forced.  [x] Be sure the sponge is large enough to hold the entire round plastic flange which is attached to the tubing. Never allow flange to be larger than the sponge or it will produce suction damaging intact skin.  Total number of individual pieces of foam used within the wound bed: 1     [x] If bridging the dressing away from the primary site, be sure the bridge leads to a piece of sponge large enough to hold the entire flange without allowing any of the flange to overlap onto intact skin.  [x] Covered sponge, gauze or channel drain with plastic drape.  [x] Cut a hole in this plastic drape directly over the sponge the same size as the plastic drain tubing.  [x] Removed plastic liner from flange and apply it directly over the hole you cut.  [x] Removed the plastic cover from the flange.  [x] Attached the tubing to the wound/ulcer Negative Pressure Therapy and turn it on to be sure a vacuum is created and that there are no leaks.  [x] If air leaks occur, use plastic drape to patch them.  [x] Secured Negative Pressure Therapy dressing with ace wrap loosely if located on an extremity. Maintain tubing outside of ace wrap. Tubing must not exert pressure on intact skin.     Applied per Yuridia Yuan Guidelines      Electronically signed by Soumya Espinoza LPN on 2/6/1648 at 10:02 AM

## 2022-07-05 NOTE — PROGRESS NOTES
Wound Care Center Progress Note With Procedure    Whitney Ponce  AGE: 50 y.o. GENDER: male  : 1970  EPISODE DATE:  2022     Subjective:     Chief Complaint   Patient presents with    Wound Check     left lateral leg         HISTORY of PRESENT ILLNESS     Dante patel 54 y.o. 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 The condition is of moderate severity. The ulcer has been present since 22 post left ankle hardware removal (related to pain) per Dr. Yosef Myrick initially had left ankle open reduction internal fixation left ankle per Dr. King Landis 21. The underlying cause is thought to be nonhealing surgical.  The patients care to date has included evaluation and follow per Dr. Angelina Moore. The patient has significant underlying medical conditions as below. 2022: Patient continues to make good progress. We are getting close to being able to remove NPT, but likely continue for another week. No issues this week. Wound is clean and dry and good granulating tissue with new skin on edges      Wound Pain Timing/Severity: intermittent, mild  Quality of pain: dull, tender  Severity of pain:  2 / 10   Modifying Factors: edema, venous stasis, obesity and smoking  Associated Signs/Symptoms: edema, erythema, drainage and pain     KERI: Right 0.56, left 0.5 as of 5/10/22     Arterial evaluation: based on wound; locations, assessment and healing progress        Venous Evaluation: based on wound; locations, assessment and healing progress     Wound infection: wound culture obtained 5/10/22     Diabetes: No  Smoking: Yes (approximately 1 pack per day). Patient counseled in length on smoking cessation including benefits of quitting and health risks of continuing to smoke including but not limited to lung cancer, COPD, risk of coronary artery disease.  Patient verbalized understanding today, is not ready to quit.   Anticoagulant/Antiplatelet therapy: Low dose asprin  Immunosuppression: No  Obesity: Yes  Nutritional status:  well nourished. Discussed need for increased protein and calories for wound healing and good sources of protein (just over 7 grams for every 20 pounds of body weight). Animal-based foods high in protein (meat, poultry, fish, eggs, and dairy foods). Plant based foods high in protein (tofu, lentils, beans, chickpeas, nuts, quinoa and abeba seeds.   Other History:     Patient educated on the 6 essential components necessary for wound healing: Circulation, Debridements, Proper Dressings and Topical Wound Products, Infection Control, Edema Control and Offloading.     Patient educated on those factors that negatively effect or impact wound healing: smoking, obesity, uncontrolled diabetes, anticoagulant and immunosuppressive regimens, inadequate nutrition, untreated arterial and venous disease if applicable and measures to manage edema.          PAST MEDICAL HISTORY        Diagnosis Date    Fracture     in ER 7/24/2021 fx left ankle- for surg 7/30/2021    History of alcohol abuse     Pericarditis     \"in 2015\" no longer forllow with cardiologist    Sleep apnea     had sleep study 2014- uses cpap       PAST SURGICAL HISTORY    Past Surgical History:   Procedure Laterality Date    ANKLE FRACTURE SURGERY Left 7/30/2021    LEFT ANKLE OPEN REDUCTION INTERNAL FIXATION performed by Deysi Schwarz MD at [address] [place] [address] Left 1/19/2022    LEFT ANKLE HARDWARE REMOVAL performed by Deysi Schwarz MD at [address] [place] [address]    Family History   Problem Relation Age of Onset    Breast Cancer Mother     Cancer Father         lung cancer       SOCIAL HISTORY    Social History     Tobacco Use    Smoking status: Current Every Day Smoker     Packs/day: 1.50     Years: 25.00     Pack years: 37.50     Types: Cigarettes    Smokeless tobacco: Never Used    Tobacco comment: instructed adverse effects Vaping Use    Vaping Use: Never used   Substance Use Topics    Alcohol use: Yes     Comment: average\" 5-6 beers per day\"    Drug use: Not Currently     Types: Marijuana Man [place])     Comment: \"20+ yrs ago \"       ALLERGIES    No Known Allergies    MEDICATIONS    Current Outpatient Medications on File Prior to Encounter   Medication Sig Dispense Refill    aspirin 325 MG EC tablet Take 1 tablet by mouth daily 30 tablet 0     No current facility-administered medications on file prior to encounter. REVIEW OF SYSTEMS    Pertinent items are noted in HPI. Constitutional: Negative for systemic symptoms including fever, chills and malaise. Objective: There were no vitals taken for this visit. PHYSICAL EXAM      General: The patient is in no acute distress. Mental status:  Patient is appropriate, is  oriented to place and plan of care. Dermatologic exam: Visual inspection of the periwound reveals the skin to be normal in turgor and texture and dry  Wound exam: see wound description below in procedure note      Assessment:     Problem List Items Addressed This Visit        Other    WD-Nonhealing nonsurgical wound with fat layer exposed - Primary    Relevant Orders    Initiate Outpatient Wound Care Protocol    WD-Skin ulcer of ankle with fat layer exposed ([address]    Relevant Orders    Initiate Outpatient Wound Care Protocol        Procedure Note    Indications:  Based on my examination of this patient's wound(s) today, sharp excision into necrotic subcutaneous tissue is required to promote healing and evaluate the extent of previous healing. Performed by: Agatha Fothergill, APRN - CNP    Consent obtained: Yes    Time out taken:  Yes    Pain Control: N/A       Debridement:Excisional Debridement    Using #15 blade scalpel the wound(s) was/were sharply debrided down through and including the removal of subcutaneous tissue.         Devitalized Tissue Debrided:  fibrin, biofilm and slough    Pre Debridement Measurements:  Are located in the Wound Documentation Flow Sheet    All active wounds listed below with today's date are evaluated  Wound(s)    debrided this date include # : 1     Post  Debridement Measurements:  Negative Pressure Wound Therapy Leg Anterior;Left;Lower (Active)   Wound Type Surgical 07/01/22 0957   Unit Type KCI  07/01/22 0957   Dressing Type Black Foam 07/01/22 0957   Number of pieces used 1 07/01/22 0957   Number of pieces removed 1 07/01/22 0957   Cycle Continuous 07/01/22 0957   Target Pressure (mmHg) 125 07/01/22 0957   Canister changed?  Yes 07/01/22 0957   Dressing Status New dressing applied 07/01/22 0957   Dressing Changed Changed/New 07/01/22 0957   Drainage Amount Moderate 07/01/22 0957   Drainage Description Serosanguinous 07/01/22 0957   Dressing Change Due 07/05/22 07/01/22 0957   Shape irregular 06/24/22 0912   Odor None 06/24/22 0912   Number of days: 38       Wound 05/10/22 Pretibial Left;Lateral (Active)   Wound Image   07/05/22 0939   Wound Etiology Non-Healing Surgical 07/01/22 0957   Dressing Status New dressing applied 06/24/22 0912   Wound Cleansed Wound cleanser 07/05/22 0939   Offloading for Diabetic Foot Ulcers Offloading boot 07/01/22 0957   Wound Length (cm) 5.4 cm 07/05/22 0939   Wound Width (cm) 1.2 cm 07/05/22 0939   Wound Depth (cm) 0.2 cm 07/05/22 0939   Wound Surface Area (cm^2) 6.48 cm^2 07/05/22 0939   Change in Wound Size % (l*w) 33.54 07/05/22 0939   Wound Volume (cm^3) 1.296 cm^3 07/05/22 0939   Wound Healing % 34 07/05/22 0939   Post-Procedure Length (cm) 5.4 cm 07/05/22 0952   Post-Procedure Width (cm) 1.2 cm 07/05/22 0952   Post-Procedure Depth (cm) 0.2 cm 07/05/22 0952   Post-Procedure Surface Area (cm^2) 6.48 cm^2 07/05/22 0952   Post-Procedure Volume (cm^3) 1.296 cm^3 07/05/22 0952   Distance Tunneling (cm) 0 cm 07/05/22 0939   Tunneling Position ___ O'Clock 0 07/05/22 0939   Undermining Starts ___ O'Clock 0 07/05/22 0939   Undermining Ends___ O'Clock 0 07/05/22 0939   Undermining Maxium Distance (cm) 0 07/05/22 0939   Wound Assessment Granulation tissue;Slough 07/05/22 0939   Drainage Amount Moderate 07/05/22 0939   Drainage Description Serosanguinous 07/05/22 0939   Odor Mild 07/05/22 0939   Reyna-wound Assessment Intact; Maceration 07/05/22 0939   Margins Defined edges 07/05/22 0939   Wound Thickness Description not for Pressure Injury Full thickness 07/05/22 0939   Number of days: 56       Percent of Wound(s) Debrided: approximately 100%    Total  Area  Debrided:  6.48 sq cm     Bleeding:  Minimal    Hemostasis Achieved:  by pressure    Procedural Pain:  3  / 10     Post Procedural Pain:  0 / 10     Response to treatment:  Well tolerated by patient. Status of wound progress and description from last visit:   Stable and improving. Can likely discontinue NPT next week and add new orders to plan of care to finish healing  Follow up 1 week and continue to monitor       Plan:       Discharge Instructions       [address]     NOTE: Upon discharge from the [address], you will receive a patient experience survey. We would be grateful if you would take the time to fill this survey out.     Wound care order history:                 KERI's   Right  0.56     Left 0.5              Date: 5/10/22              Cultures:                 Grafts:                Antibiotics:           Continuing wound care orders and information:                 Residence:                Continue home health care with:               Your wound-care supplies will be provided by:      Wound cleansing:               QS not scrub or use excessive force.              Wash hands with soap and water before and after dressing changes.               VOITS to applying a clean dressing, cleanse wound with normal saline, wound cleanser, or mild soap and water.               Ask the physician or nurse before getting the wound(s) wet in a shower     Daily Wound management:              Keep weight off wounds and reposition every 2 hours.              Avoid standing for long periods of time.              Apply wraps/stockings in AM and remove at bedtime.              If swelling is present, elevate legs to the level of the heart or above for 30 minutes 4-5 times a day and/or when sitting.                                      When taking antibiotics take entire prescription as ordered by physician do not stop taking until medicine is all gone.                                                           Orders for this week: 7/5/22        Culture of Left Lateral Ankle taken on 5/10/22.        Rx: Drug [facility] in [address]           Applied for wound vac on 5/10/22, once available start applying in clinic.     WOUND VAC THERAPY: Left Lateral Ankle     DUODERM TO PERIWOUND FOR PROTECTION. APPLY Anasept, Stimulen and BLACK FOAM TO WOUND. SECURE VAC DRESSING WITH DRAPE.     SET WOUND VAC  CONTINUOUS SUCTION. CANISTER CHANGE WEEKLY OR ACCORDING TO VOLUME OF DRAINAGE.     WRAP ACE AROUND FOOT AND ANKLE (do not wrap tubing against skin).     WOUND VAC DRESSING TO BE CHANGED Milo.          Nurse visits on Fridays to change Wound Vac. Follow up with Micaela Germain CNP in 1 week in the wound care center. Call [phone] for any questions or concerns.   Date__________   Time___________        Treatment Note      Written Patient Dismissal Instructions Given            Electronically signed by KISHA Metzger CNP on 7/5/2022 at 9:56 AM

## 2022-07-08 ENCOUNTER — HOSPITAL ENCOUNTER (OUTPATIENT)
Dept: WOUND CARE | Age: 52
Discharge: HOME | End: 2022-07-08
Payer: COMMERCIAL

## 2022-07-08 VITALS
SYSTOLIC BLOOD PRESSURE: 168 MMHG | TEMPERATURE: 97.8 F | DIASTOLIC BLOOD PRESSURE: 86 MMHG | RESPIRATION RATE: 16 BRPM | HEART RATE: 89 BPM

## 2022-07-08 PROCEDURE — 97605 NEG PRS WND THER DME<=50SQCM: CPT

## 2022-07-08 NOTE — PROGRESS NOTES
Negative Pressure Wound Therapy    NAME:  Harjeet Cai  YOB: 1970  MEDICAL RECORD NUMBER:  [id number]  DATE:  7/8/2022     Applied Negative Pressure to lower left wound(s)/ulcer(s).  [x] Applied skin barrier prep to ilene-wound.  [x] Cut strips of plastic drape to picture frame wound so that ilene-wound is     covered with the drape.  [x] If bridging dressing to less prominent site, cover any intact skin that will come in contact with the Negative Pressure Therapy sponge, gauze or channel drain with plastic drape. The sponge should never touch intact skin.  [x] Cut sponge, gauze or channel drain to size which will fit into the wound/ulcer bed without being forced.  [x] Be sure the sponge is large enough to hold the entire round plastic flange which is attached to the tubing. Never allow flange to be larger than the sponge or it will produce suction damaging intact skin.  Total number of individual pieces of foam used within the wound bed: 1     [x] If bridging the dressing away from the primary site, be sure the bridge leads to a piece of sponge large enough to hold the entire flange without allowing any of the flange to overlap onto intact skin.  [x] Covered sponge, gauze or channel drain with plastic drape.  [x] Cut a hole in this plastic drape directly over the sponge the same size as the plastic drain tubing.  [x] Removed plastic liner from flange and apply it directly over the hole you cut.  [x] Removed the plastic cover from the flange.  [x] Attached the tubing to the wound/ulcer Negative Pressure Therapy and turn it on to be sure a vacuum is created and that there are no leaks.  [x] If air leaks occur, use plastic drape to patch them.  [x] Secured Negative Pressure Therapy dressing with ace wrap loosely if located on an extremity. Maintain tubing outside of ace wrap. Tubing must not exert pressure on intact skin.     Applied per Yuridia Yuan

## 2022-07-12 ENCOUNTER — HOSPITAL ENCOUNTER (OUTPATIENT)
Dept: WOUND CARE | Age: 52
Discharge: HOME | End: 2022-07-12
Payer: COMMERCIAL

## 2022-07-12 VITALS
DIASTOLIC BLOOD PRESSURE: 86 MMHG | SYSTOLIC BLOOD PRESSURE: 146 MMHG | TEMPERATURE: 97 F | HEART RATE: 105 BPM | RESPIRATION RATE: 16 BRPM

## 2022-07-12 DIAGNOSIS — T14.8XXA NONHEALING NONSURGICAL WOUND WITH FAT LAYER EXPOSED: Primary | ICD-10-CM

## 2022-07-12 DIAGNOSIS — L97.322 SKIN ULCER OF LEFT ANKLE WITH FAT LAYER EXPOSED (HCC): ICD-10-CM

## 2022-07-12 PROCEDURE — 11042 DBRDMT SUBQ TIS 1ST 20SQCM/<: CPT | Performed by: NURSE PRACTITIONER

## 2022-07-12 PROCEDURE — 11042 DBRDMT SUBQ TIS 1ST 20SQCM/<: CPT

## 2022-07-12 RX ORDER — GENTAMICIN SULFATE 1 MG/G
OINTMENT TOPICAL ONCE
Status: CANCELLED | OUTPATIENT
Start: 2022-07-12 | End: 2022-07-12

## 2022-07-12 RX ORDER — LIDOCAINE HYDROCHLORIDE 40 MG/ML
SOLUTION TOPICAL ONCE
Status: CANCELLED | OUTPATIENT
Start: 2022-07-12 | End: 2022-07-12

## 2022-07-12 RX ORDER — BETAMETHASONE DIPROPIONATE 0.05 %
OINTMENT (GRAM) TOPICAL ONCE
Status: CANCELLED | OUTPATIENT
Start: 2022-07-12 | End: 2022-07-12

## 2022-07-12 RX ORDER — GINSENG 100 MG
CAPSULE ORAL ONCE
Status: CANCELLED | OUTPATIENT
Start: 2022-07-12 | End: 2022-07-12

## 2022-07-12 RX ORDER — BACITRACIN ZINC AND POLYMYXIN B SULFATE 500; 1000 [USP'U]/G; [USP'U]/G
OINTMENT TOPICAL ONCE
Status: CANCELLED | OUTPATIENT
Start: 2022-07-12 | End: 2022-07-12

## 2022-07-12 RX ORDER — CLOBETASOL PROPIONATE 0.5 MG/G
OINTMENT TOPICAL ONCE
Status: CANCELLED | OUTPATIENT
Start: 2022-07-12 | End: 2022-07-12

## 2022-07-12 RX ORDER — LIDOCAINE 50 MG/G
OINTMENT TOPICAL ONCE
Status: CANCELLED | OUTPATIENT
Start: 2022-07-12 | End: 2022-07-12

## 2022-07-12 RX ORDER — LIDOCAINE 40 MG/G
CREAM TOPICAL ONCE
Status: CANCELLED | OUTPATIENT
Start: 2022-07-12 | End: 2022-07-12

## 2022-07-12 RX ORDER — BACITRACIN, NEOMYCIN, POLYMYXIN B 400; 3.5; 5 [USP'U]/G; MG/G; [USP'U]/G
OINTMENT TOPICAL ONCE
Status: CANCELLED | OUTPATIENT
Start: 2022-07-12 | End: 2022-07-12

## 2022-07-12 NOTE — PROGRESS NOTES
Wound Care Center Progress Note With Procedure    Michaelle Perry  AGE: 50 y.o. GENDER: male  : 1970  EPISODE DATE:  2022     Subjective:     Chief Complaint   Patient presents with    Wound Check     left lateral ankle          HISTORY of PRESENT ILLNESS   Michaelle Perry is a 54 y.o. male who presents to the [address] [place] [address] for evaluation and treatment of Chronic non-healing surgical  ulcer(s) of the left lateral ankle. The condition is of moderate severity. The ulcer has been present since 22 post left ankle hardware removal (related to pain) per Dr. Jordan Pedro. He initially had left ankle open reduction internal fixation left ankle per Dr. Jordan Pedro 21. The underlying cause is thought to be nonhealing surgical.  The patients care to date has included evaluation and follow per Dr. Scottie Pate. The patient has significant underlying medical conditions as below.      Wound Pain Timing/Severity: intermittent, mild  Quality of pain: dull, tender  Severity of pain:  2 / 10   Modifying Factors: edema, venous stasis, obesity and smoking  Associated Signs/Symptoms: edema, erythema, drainage and pain     KERI: Right 0.56, left 0.5 as of 5/10/22     Arterial evaluation: based on wound; locations, assessment and healing progress        Venous Evaluation: based on wound; locations, assessment and healing progress     Wound infection: wound culture obtained 5/10/22     Diabetes: No  Smoking: Yes (approximately 1 pack per day). Patient counseled in length on smoking cessation including benefits of quitting and health risks of continuing to smoke including but not limited to lung cancer, COPD, risk of coronary artery disease. Patient verbalized understanding today, is not ready to quit. Anticoagulant/Antiplatelet therapy: Low dose asprin  Immunosuppression: No  Obesity: Yes  Nutritional status:  well nourished.  Discussed need for increased protein and calories for wound healing and good sources of protein (just over 7 Allergies    MEDICATIONS    Current Outpatient Medications on File Prior to Encounter   Medication Sig Dispense Refill    aspirin 325 MG EC tablet Take 1 tablet by mouth daily 30 tablet 0     No current facility-administered medications on file prior to encounter. REVIEW OF SYSTEMS    Pertinent items are noted in HPI. Constitutional: Negative for systemic symptoms including fever, chills and malaise. Objective:      BP (!) 146/86   Pulse (!) 105   Temp 97 °F (36.1 °C) (Temporal)   Resp 16     PHYSICAL EXAM    General: The patient is in no acute distress. Mental status:  Patient is appropriate, is  oriented to place and plan of care. Dermatologic exam: Visual inspection of the periwound reveals the skin to be edematous  Wound exam: see wound description below in procedure note      Assessment:     Problem List Items Addressed This Visit        Other    WD-Nonhealing nonsurgical wound with fat layer exposed - Primary    Relevant Orders    Initiate Outpatient Wound Care Protocol    Neg. Pressure Wound Therapy    WD-Skin ulcer of ankle with fat layer exposed ([address]    Relevant Orders    Initiate Outpatient Wound Care Protocol    Neg. Pressure Wound Therapy        Procedure Note    Indications:  Based on my examination of this patient's wound(s) today, sharp excision into necrotic subcutaneous tissue is required to promote healing and evaluate the extent of previous healing. Performed by: KISHA Lozano CNP    Consent obtained: Yes    Time out taken:  Yes    Pain Control:       Debridement:Excisional Debridement    Using curette the wound(s) was/were sharply debrided down through and including the removal of subcutaneous tissue.         Devitalized Tissue Debrided:  slough and exudate    Pre Debridement Measurements:  Are located in the Wound Documentation Flow Sheet    All active wounds listed below with today's date are evaluated  Wound(s)    debrided this date include # : 1     Post Debridement Measurements:  Negative Pressure Wound Therapy Leg Anterior;Left;Lower (Active)   Wound Type Surgical 07/08/22 0929   Unit Type KCI 07/08/22 0929   Dressing Type Black Foam 07/08/22 0929   Number of pieces used 1 07/08/22 0929   Number of pieces removed 1 07/01/22 0957   Cycle Continuous 07/08/22 0929   Target Pressure (mmHg) 125 07/08/22 0929   Canister changed?  Yes 07/01/22 0957   Dressing Status New dressing applied 07/08/22 0929   Dressing Changed Changed/New 07/08/22 0929   Drainage Amount Moderate 07/08/22 0929   Drainage Description Serosanguinous 07/08/22 0929   Dressing Change Due 07/11/22 07/08/22 0929   Shape irregular 06/24/22 0912   Odor None 06/24/22 0912   Number of days: 45       Wound 05/10/22, 1# Pretibial Left;Lateral (Active)   Wound Image   07/05/22 0939   Wound Etiology Non-Healing Surgical 07/12/22 0915   Dressing Status New dressing applied 07/08/22 0929   Wound Cleansed Wound cleanser 07/12/22 0915   Offloading for Diabetic Foot Ulcers Offloading boot 07/08/22 0929   Wound Length (cm) 5.3 cm 07/12/22 0915   Wound Width (cm) 1 cm 07/12/22 0915   Wound Depth (cm) 0.1 cm 07/12/22 0915   Wound Surface Area (cm^2) 5.3 cm^2 07/12/22 0915   Change in Wound Size % (l*w) 45.64 07/12/22 0915   Wound Volume (cm^3) 0.53 cm^3 07/12/22 0915   Wound Healing % 73 07/12/22 0915   Post-Procedure Length (cm) 5.3 cm 07/12/22 0927   Post-Procedure Width (cm) 1 cm 07/12/22 0927   Post-Procedure Depth (cm) 0.1 cm 07/12/22 0927   Post-Procedure Surface Area (cm^2) 5.3 cm^2 07/12/22 0927   Post-Procedure Volume (cm^3) 0.53 cm^3 07/12/22 0927   Distance Tunneling (cm) 0 cm 07/12/22 0915   Tunneling Position ___ O'Clock 0 07/12/22 0915   Undermining Starts ___ O'Clock 0 07/12/22 0915   Undermining Ends___ O'Clock 0 07/12/22 0915   Undermining Maxium Distance (cm) 0 07/12/22 0915   Wound Assessment Granulation tissue;Slough 07/12/22 0915   Drainage Amount Moderate 07/12/22 0915   Drainage Description standing for long periods of time.              Apply wraps/stockings in AM and remove at bedtime.              If swelling is present, elevate legs to the level of the heart or above for 30 minutes 4-5 times a day and/or when sitting.                                      When taking antibiotics take entire prescription as ordered by physician do not stop taking until medicine is all gone.                                                           Orders for this week: 7/12/22        Culture of Left Lateral Ankle taken on 5/10/22.        Rx: [facility] in [place]      Today in clinic:  Spray Lidocaine to wound, then apply whole package of Stimulen powder to wound bed. Cover with ca alginate. Wrap with Kerlix and Coban to secure. Leave in place until Friday.         WOUND VAC THERAPY: Left Lateral Ankle (hold vac until Friday 7/15/22, then resume)     DUODERM TO PERIWOUND FOR PROTECTION. APPLY Anasept, Stimulen and BLACK FOAM TO WOUND. SECURE VAC DRESSING WITH DRAPE.     SET WOUND VAC  CONTINUOUS SUCTION. CANISTER CHANGE WEEKLY OR ACCORDING TO VOLUME OF DRAINAGE.     WRAP ACE AROUND FOOT AND ANKLE (do not wrap tubing against skin).     WOUND VAC DRESSING TO BE CHANGED Milo.          Nurse visits on Fridays to change Wound Vac. Follow up with Patria Haney CNP in 1 week in the wound care center. Call [phone] for any questions or concerns.   Date__________   Time___________        Treatment Note      Written Patient Dismissal Instructions Given            Electronically signed by KISHA Oh CNP on 7/12/2022 at 9:49 AM

## 2022-07-15 ENCOUNTER — HOSPITAL ENCOUNTER (OUTPATIENT)
Dept: WOUND CARE | Age: 52
Discharge: HOME | End: 2022-07-15

## 2022-07-19 ENCOUNTER — HOSPITAL ENCOUNTER (OUTPATIENT)
Dept: WOUND CARE | Age: 52
Discharge: HOME | End: 2022-07-19
Payer: COMMERCIAL

## 2022-07-19 VITALS
DIASTOLIC BLOOD PRESSURE: 77 MMHG | RESPIRATION RATE: 16 BRPM | SYSTOLIC BLOOD PRESSURE: 162 MMHG | TEMPERATURE: 97.7 F | HEART RATE: 84 BPM

## 2022-07-19 DIAGNOSIS — T14.8XXA NONHEALING NONSURGICAL WOUND WITH FAT LAYER EXPOSED: Primary | ICD-10-CM

## 2022-07-19 DIAGNOSIS — L97.322 SKIN ULCER OF LEFT ANKLE WITH FAT LAYER EXPOSED (HCC): ICD-10-CM

## 2022-07-19 DIAGNOSIS — T14.8XXA NONHEALING NONSURGICAL WOUND WITH FAT LAYER EXPOSED: ICD-10-CM

## 2022-07-19 PROCEDURE — 11042 DBRDMT SUBQ TIS 1ST 20SQCM/<: CPT | Performed by: NURSE PRACTITIONER

## 2022-07-19 PROCEDURE — 11042 DBRDMT SUBQ TIS 1ST 20SQCM/<: CPT

## 2022-07-19 RX ORDER — CLOBETASOL PROPIONATE 0.5 MG/G
OINTMENT TOPICAL ONCE
Status: CANCELLED | OUTPATIENT
Start: 2022-07-19 | End: 2022-07-19

## 2022-07-19 RX ORDER — GINSENG 100 MG
CAPSULE ORAL ONCE
Status: CANCELLED | OUTPATIENT
Start: 2022-07-19 | End: 2022-07-19

## 2022-07-19 RX ORDER — LIDOCAINE HYDROCHLORIDE 40 MG/ML
SOLUTION TOPICAL ONCE
Status: CANCELLED | OUTPATIENT
Start: 2022-07-19 | End: 2022-07-19

## 2022-07-19 RX ORDER — GENTAMICIN SULFATE 1 MG/G
OINTMENT TOPICAL ONCE
Status: CANCELLED | OUTPATIENT
Start: 2022-07-19 | End: 2022-07-19

## 2022-07-19 RX ORDER — BACITRACIN ZINC AND POLYMYXIN B SULFATE 500; 1000 [USP'U]/G; [USP'U]/G
OINTMENT TOPICAL ONCE
Status: CANCELLED | OUTPATIENT
Start: 2022-07-19 | End: 2022-07-19

## 2022-07-19 RX ORDER — BETAMETHASONE DIPROPIONATE 0.05 %
OINTMENT (GRAM) TOPICAL ONCE
Status: CANCELLED | OUTPATIENT
Start: 2022-07-19 | End: 2022-07-19

## 2022-07-19 RX ORDER — LIDOCAINE 50 MG/G
OINTMENT TOPICAL ONCE
Status: CANCELLED | OUTPATIENT
Start: 2022-07-19 | End: 2022-07-19

## 2022-07-19 RX ORDER — BACITRACIN, NEOMYCIN, POLYMYXIN B 400; 3.5; 5 [USP'U]/G; MG/G; [USP'U]/G
OINTMENT TOPICAL ONCE
Status: CANCELLED | OUTPATIENT
Start: 2022-07-19 | End: 2022-07-19

## 2022-07-19 RX ORDER — LIDOCAINE 40 MG/G
CREAM TOPICAL ONCE
Status: CANCELLED | OUTPATIENT
Start: 2022-07-19 | End: 2022-07-19

## 2022-07-19 NOTE — PROGRESS NOTES
Multilayer Compression Wrap   (Not Unna) Below the Knee    NAME:  Leatha Sharp  YOB: 1970  MEDICAL RECORD NUMBER:  [id number]  DATE:  7/19/2022    Multilayer compression wrap: Removed old Multilayer wrap if indicated and wash leg with mild soap/water. Applied moisturizing agent to dry skin as needed. Applied primary and secondary dressing as ordered. Applied multilayered dressing below the knee to left lower leg. Instructed patient/caregiver not to remove dressing and to keep it clean and dry. Instructed patient/caregiver on complications to report to provider, such as pain, numbness in toes, heavy drainage, and slippage of dressing. Instructed patient on purpose of compression dressing and on activity and exercise recommendations.       Electronically signed by Evonne Quinones RN on 7/19/2022 at 9:52 AM

## 2022-07-19 NOTE — DISCHARGE INSTRUCTIONS
PHYSICIAN ORDERS AND DISCHARGE INSTRUCTIONS     NOTE: Upon discharge from the [address], you will receive a patient experience survey. We would be grateful if you would take the time to fill this survey out. Wound care order history:                 KERI's   Right  0.56     Left 0.5              Date: 5/10/22              Cultures:                 Grafts:  Applied for grafts 7/12/22              Antibiotics:           Continuing wound care orders and information:                 Residence:                Continue home health care with:              Your wound-care supplies will be provided by: Wound cleansing:              Do not scrub or use excessive force. Wash hands with soap and water before and after dressing changes. Prior to applying a clean dressing, cleanse wound with normal saline, wound cleanser, or mild soap and water. Ask the physician or nurse before getting the wound(s) wet in a shower     Daily Wound management:              Keep weight off wounds and reposition every 2 hours. Avoid standing for long periods of time. Apply wraps/stockings in AM and remove at bedtime. If swelling is present, elevate legs to the level of the heart or above for 30 minutes 4-5 times a day and/or when sitting. When taking antibiotics take entire prescription as ordered by physician do not stop taking until medicine is all gone. Orders for this week: 7/19/22        Culture of Left Lateral Ankle taken on 5/10/22. Rx: Drug [facility] in [place]       Left Lateral Ankle - Wash with soap and water, pat dry. Apply Small amount of Anasept and Stimulen to wound bed. Cover with super absorber (Sorbex). Wrap with Coban 2 Lite. Leave in place until Friday.            WOUND VAC THERAPY: Left Lateral Ankle (hold vac as of 7/19/22)     DUODERM TO PERIWOUND FOR PROTECTION. APPLY Anasept, Stimulen and BLACK FOAM TO WOUND. SECURE VAC DRESSING WITH DRAPE. SET WOUND VAC  CONTINUOUS SUCTION. CANISTER CHANGE WEEKLY OR ACCORDING TO VOLUME OF DRAINAGE. WRAP ACE AROUND FOOT AND ANKLE (do not wrap tubing against skin). WOUND VAC DRESSING TO BE CHANGED TUESDAYS AND FRIDAYS IN CLINIC. Nurse visits on Fridays to change Coban 2 Lite wrap. Follow up with Anushka Quinonez CNP in 1 week in the wound care center. Call [phone] for any questions or concerns.   Date__________   Time___________

## 2022-07-19 NOTE — PLAN OF CARE
Problem: Wound:  Goal: Will show signs of wound healing; wound closure and no evidence of infection  Description: Will show signs of wound healing; wound closure and no evidence of infection  Outcome: Progressing Towards Goal

## 2022-07-21 NOTE — PROGRESS NOTES
Wound Care Center Progress Note With Procedure    Marian Tejada  AGE: 50 y.o. GENDER: male  : 1970  EPISODE DATE:  2022     Subjective:     Chief Complaint   Patient presents with    Wound Check     Left foot          HISTORY of PRESENT ILLNESS   Marian Tejada is a 54 y.o. male who presents to the [address] [place] [address] for evaluation and treatment of Chronic non-healing surgical  ulcer(s) of the left lateral ankle. The condition is of moderate severity. The ulcer has been present since 22 post left ankle hardware removal (related to pain) per Dr. Fortunato Soliman. He initially had left ankle open reduction internal fixation left ankle per Dr. Fortunato Soliman 21. The underlying cause is thought to be nonhealing surgical.  The patients care to date has included evaluation and follow per Dr. Shanda Espino. The patient has significant underlying medical conditions as below. Wound Pain Timing/Severity: intermittent, mild  Quality of pain: dull, tender  Severity of pain:  2 / 10   Modifying Factors: edema, venous stasis, obesity and smoking  Associated Signs/Symptoms: edema, erythema, drainage and pain     KERI: Right 0.56, left 0.5 as of 5/10/22     Arterial evaluation: based on wound; locations, assessment and healing progress        Venous Evaluation: based on wound; locations, assessment and healing progress     Wound infection: wound culture obtained 5/10/22     Diabetes: No  Smoking: Yes (approximately 1 pack per day). Patient counseled in length on smoking cessation including benefits of quitting and health risks of continuing to smoke including but not limited to lung cancer, COPD, risk of coronary artery disease. Patient verbalized understanding today, is not ready to quit. Anticoagulant/Antiplatelet therapy: Low dose asprin  Immunosuppression: No  Obesity: Yes  Nutritional status:  well nourished.  Discussed need for increased protein and calories for wound healing and good sources of protein (just over 7 grams for every 20 pounds of body weight). Animal-based foods high in protein (meat, poultry, fish, eggs, and dairy foods). Plant based foods high in protein (tofu, lentils, beans, chickpeas, nuts, quinoa and abeba seeds. Other History:     Patient educated on the 6 essential components necessary for wound healing: Circulation, Debridements, Proper Dressings and Topical Wound Products, Infection Control, Edema Control and Offloading. Patient educated on those factors that negatively effect or impact wound healing: smoking, obesity, uncontrolled diabetes, anticoagulant and immunosuppressive regimens, inadequate nutrition, untreated arterial and venous disease if applicable and measures to manage edema.           PAST MEDICAL HISTORY        Diagnosis Date    Fracture     in ER 7/24/2021 fx left ankle- for surg 7/30/2021    History of alcohol abuse     Pericarditis     \"in 2015\" no longer forllow with cardiologist    Sleep apnea     had sleep study 2014- uses cpap       PAST SURGICAL HISTORY    Past Surgical History:   Procedure Laterality Date    ANKLE FRACTURE SURGERY Left 7/30/2021    LEFT ANKLE OPEN REDUCTION INTERNAL FIXATION performed by Shari Tolbert MD at [address] [facility] Left 1/19/2022    LEFT ANKLE HARDWARE REMOVAL performed by Shari Tolbert MD at [facility]    Family History   Problem Relation Age of Onset    Breast Cancer Mother     Cancer Father         lung cancer       SOCIAL HISTORY    Social History     Tobacco Use    Smoking status: Every Day     Packs/day: 1.50     Years: 25.00     Pack years: 37.50     Types: Cigarettes    Smokeless tobacco: Never    Tobacco comments:     instructed adverse effects   Vaping Use    Vaping Use: Never used   Substance Use Topics    Alcohol use: Yes     Comment: average\" 5-6 beers per day\"    Drug use: Not Currently     Types: Marijuana Nicole Jan)     Comment: \"20+ yrs ago \"       ALLERGIES    No Known Allergies    MEDICATIONS    Current Outpatient Medications on File Prior to Encounter   Medication Sig Dispense Refill    aspirin 325 MG EC tablet Take 1 tablet by mouth daily 30 tablet 0     No current facility-administered medications on file prior to encounter. REVIEW OF SYSTEMS    Pertinent items are noted in HPI. Constitutional: Negative for systemic symptoms including fever, chills and malaise. Objective:      BP (!) 162/77   Pulse 84   Temp 97.7 °F (36.5 °C) (Temporal)   Resp 16     PHYSICAL EXAM    General: The patient is in no acute distress. Mental status:  Patient is appropriate, is  oriented to place and plan of care. Dermatologic exam: Visual inspection of the periwound reveals the skin to be edematous  Wound exam: see wound description below in procedure note      Assessment:     Problem List Items Addressed This Visit          Other    WD-Nonhealing nonsurgical wound with fat layer exposed - Primary    WD-Skin ulcer of ankle with fat layer exposed ([facility] [address]     Procedure Note    Indications:  Based on my examination of this patient's wound(s) today, sharp excision into necrotic subcutaneous tissue is required to promote healing and evaluate the extent of previous healing. Performed by: KISHA Matos - CNP    Consent obtained: Yes    Time out taken:  Yes    Pain Control:       Debridement:Excisional Debridement    Using curette the wound(s) was/were sharply debrided down through and including the removal of subcutaneous tissue.         Devitalized Tissue Debrided:  slough and exudate    Pre Debridement Measurements:  Are located in the Wound Documentation Flow Sheet    All active wounds listed below with today's date are evaluated  Wound(s)    debrided this date include # : 1     Post  Debridement Measurements:  Negative Pressure Wound Therapy Leg Anterior;Left;Lower (Active)   Wound Type Surgical 07/08/22 0929   Unit Type KCI 07/08/22 0929   Dressing Type Black Foam 07/08/22 0929   Number of pieces used 1 07/08/22 0929 Number of pieces removed 1 07/01/22 0957   Cycle Continuous 07/08/22 0929   Target Pressure (mmHg) 125 07/08/22 0929   Canister changed? Yes 07/01/22 0957   Dressing Status New dressing applied 07/08/22 0929   Dressing Changed Changed/New 07/08/22 0929   Drainage Amount Moderate 07/08/22 0929   Drainage Description Serosanguinous 07/08/22 0929   Dressing Change Due 07/11/22 07/08/22 0929   Shape irregular 06/24/22 0912   Odor None 06/24/22 0912   Number of days: 54       Wound 05/10/22 Pretibial Left;Lateral (Active)   Wound Image   07/05/22 0939   Wound Etiology Non-Healing Surgical 07/15/22 0952   Dressing Status New dressing applied 07/19/22 0951   Wound Cleansed Wound cleanser 07/19/22 0928   Offloading for Diabetic Foot Ulcers Offloading boot 07/15/22 0952   Wound Length (cm) 2.7 cm 07/19/22 0928   Wound Width (cm) 0.7 cm 07/19/22 0928   Wound Depth (cm) 0.2 cm 07/19/22 0928   Wound Surface Area (cm^2) 1.89 cm^2 07/19/22 0928   Change in Wound Size % (l*w) 80.62 07/19/22 0928   Wound Volume (cm^3) 0.378 cm^3 07/19/22 0928   Wound Healing % 81 07/19/22 0928   Post-Procedure Length (cm) 2.7 cm 07/19/22 0939   Post-Procedure Width (cm) 0.7 cm 07/19/22 0939   Post-Procedure Depth (cm) 0.2 cm 07/19/22 0939   Post-Procedure Surface Area (cm^2) 1.89 cm^2 07/19/22 0939   Post-Procedure Volume (cm^3) 0.378 cm^3 07/19/22 0939   Distance Tunneling (cm) 0 cm 07/19/22 0928   Tunneling Position ___ O'Clock 0 07/19/22 0928   Undermining Starts ___ O'Clock 0 07/19/22 0928   Undermining Ends___ O'Clock 0 07/19/22 0928   Undermining Maxium Distance (cm) 0 07/19/22 0928   Wound Assessment Arena/red;Slough 07/19/22 0928   Drainage Amount Moderate 07/19/22 0928   Drainage Description Serosanguinous 07/19/22 0928   Odor Mild 07/19/22 0928   Reyna-wound Assessment Intact; Maceration 07/19/22 0928   Margins Defined edges 07/19/22 0928   Wound Thickness Description not for Pressure Injury Full thickness 07/19/22 0928   Number of days: 71       Percent of Wound(s) Debrided: approximately 100%    Total  Area  Debrided: 1.89 sq cm     Bleeding:  Minimal    Hemostasis Achieved:  by pressure    Procedural Pain:  0  / 10     Post Procedural Pain:  0 / 10     Response to treatment:  Well tolerated by patient. Status of wound progress and description from last visit: Improving. Will hold NPWT and use a compression wrap. Grafts not covered by insurance. Plan:       Discharge Instructions         PHYSICIAN ORDERS AND DISCHARGE INSTRUCTIONS     NOTE: Upon discharge from the [address], you will receive a patient experience survey. We would be grateful if you would take the time to fill this survey out. Wound care order history:                 EKRI's   Right  0.56     Left 0.5              Date: 5/10/22              Cultures:                 Grafts:  Applied for grafts 7/12/22              Antibiotics:           Continuing wound care orders and information:                 Residence:                Continue home health care with:              Your wound-care supplies will be provided by: Wound cleansing:              Do not scrub or use excessive force. Wash hands with soap and water before and after dressing changes. Prior to applying a clean dressing, cleanse wound with normal saline, wound cleanser, or mild soap and water. Ask the physician or nurse before getting the wound(s) wet in a shower     Daily Wound management:              Keep weight off wounds and reposition every 2 hours. Avoid standing for long periods of time. Apply wraps/stockings in AM and remove at bedtime. If swelling is present, elevate legs to the level of the heart or above for 30 minutes 4-5 times a day and/or when sitting. When taking antibiotics take entire prescription as ordered by physician do not stop taking until medicine is all gone. Orders for this week: 7/19/22        Culture of Left Lateral Ankle taken on 5/10/22. Rx: [facility] in [place]       Left Lateral Ankle - Wash with soap and water, pat dry. Apply Small amount of Anasept and Stimulen to wound bed. Cover with super absorber (Sorbex). Wrap with Coban 2 Lite. Leave in place until Friday. WOUND VAC THERAPY: Left Lateral Ankle (hold vac as of 7/19/22)     DUODERM TO PERIWOUND FOR PROTECTION. APPLY Anasept, Stimulen and BLACK FOAM TO WOUND. SECURE VAC DRESSING WITH DRAPE. SET WOUND VAC  CONTINUOUS SUCTION. CANISTER CHANGE WEEKLY OR ACCORDING TO VOLUME OF DRAINAGE. WRAP ACE AROUND FOOT AND ANKLE (do not wrap tubing against skin). WOUND VAC DRESSING TO BE CHANGED TUESDAYS AND FRIDAYS IN CLINIC. Nurse visits on Fridays to change Coban 2 Lite wrap. Follow up with Ortiz Fletcher CNP in 1 week in the wound care center. Call [phone] for any questions or concerns.   Date__________   Time___________        Treatment Note Wound 05/10/22 Pretibial Left;Lateral-Dressing/Treatment:  (Anasept, Stimulen, Sorbex, Coban 2 Lite)    Written Patient Dismissal Instructions Given            Electronically signed by KISHA Golden CNP on 7/21/2022 at 8:29 AM

## 2022-07-22 ENCOUNTER — HOSPITAL ENCOUNTER (OUTPATIENT)
Dept: WOUND CARE | Age: 52
Discharge: HOME | End: 2022-07-22
Payer: COMMERCIAL

## 2022-07-22 VITALS
RESPIRATION RATE: 16 BRPM | HEART RATE: 76 BPM | TEMPERATURE: 97.8 F | SYSTOLIC BLOOD PRESSURE: 166 MMHG | DIASTOLIC BLOOD PRESSURE: 94 MMHG

## 2022-07-22 DIAGNOSIS — T14.8XXA NONHEALING NONSURGICAL WOUND WITH FAT LAYER EXPOSED: Primary | ICD-10-CM

## 2022-07-22 DIAGNOSIS — L97.322 SKIN ULCER OF LEFT ANKLE WITH FAT LAYER EXPOSED (HCC): ICD-10-CM

## 2022-07-22 PROCEDURE — 29581 APPL MULTLAYER CMPRN SYS LEG: CPT

## 2022-07-22 RX ORDER — BACITRACIN, NEOMYCIN, POLYMYXIN B 400; 3.5; 5 [USP'U]/G; MG/G; [USP'U]/G
OINTMENT TOPICAL ONCE
Status: CANCELLED | OUTPATIENT
Start: 2022-07-22 | End: 2022-07-22

## 2022-07-22 RX ORDER — LIDOCAINE 50 MG/G
OINTMENT TOPICAL ONCE
Status: CANCELLED | OUTPATIENT
Start: 2022-07-22 | End: 2022-07-22

## 2022-07-22 RX ORDER — BACITRACIN ZINC AND POLYMYXIN B SULFATE 500; 1000 [USP'U]/G; [USP'U]/G
OINTMENT TOPICAL ONCE
Status: CANCELLED | OUTPATIENT
Start: 2022-07-22 | End: 2022-07-22

## 2022-07-22 RX ORDER — GENTAMICIN SULFATE 1 MG/G
OINTMENT TOPICAL ONCE
Status: CANCELLED | OUTPATIENT
Start: 2022-07-22 | End: 2022-07-22

## 2022-07-22 RX ORDER — GINSENG 100 MG
CAPSULE ORAL ONCE
Status: CANCELLED | OUTPATIENT
Start: 2022-07-22 | End: 2022-07-22

## 2022-07-22 RX ORDER — LIDOCAINE HYDROCHLORIDE 40 MG/ML
SOLUTION TOPICAL ONCE
Status: CANCELLED | OUTPATIENT
Start: 2022-07-22 | End: 2022-07-22

## 2022-07-22 RX ORDER — BETAMETHASONE DIPROPIONATE 0.05 %
OINTMENT (GRAM) TOPICAL ONCE
Status: CANCELLED | OUTPATIENT
Start: 2022-07-22 | End: 2022-07-22

## 2022-07-22 RX ORDER — CLOBETASOL PROPIONATE 0.5 MG/G
OINTMENT TOPICAL ONCE
Status: CANCELLED | OUTPATIENT
Start: 2022-07-22 | End: 2022-07-22

## 2022-07-22 RX ORDER — LIDOCAINE 40 MG/G
CREAM TOPICAL ONCE
Status: CANCELLED | OUTPATIENT
Start: 2022-07-22 | End: 2022-07-22

## 2022-07-22 NOTE — PROGRESS NOTES
Multilayer Compression Wrap   (Not Unna) Below the Knee    NAME:  Marian Tejada  YOB: 1970  MEDICAL RECORD NUMBER:  [id number]  DATE:  7/22/2022    Multilayer compression wrap: Removed old Multilayer wrap if indicated and wash leg with mild soap/water. Applied moisturizing agent to dry skin as needed. Applied primary and secondary dressing as ordered. Applied multilayered dressing below the knee to left lower leg. Instructed patient/caregiver not to remove dressing and to keep it clean and dry. Instructed patient/caregiver on complications to report to provider, such as pain, numbness in toes, heavy drainage, and slippage of dressing. Instructed patient on purpose of compression dressing and on activity and exercise recommendations.       Electronically signed by Cathie Gilmore LPN on 7/95/7463 at 8:96 AM

## 2022-07-26 ENCOUNTER — HOSPITAL ENCOUNTER (OUTPATIENT)
Dept: WOUND CARE | Age: 52
Discharge: HOME | End: 2022-07-26
Payer: COMMERCIAL

## 2022-07-26 VITALS
RESPIRATION RATE: 16 BRPM | TEMPERATURE: 97.6 F | HEART RATE: 85 BPM | SYSTOLIC BLOOD PRESSURE: 152 MMHG | DIASTOLIC BLOOD PRESSURE: 93 MMHG

## 2022-07-26 DIAGNOSIS — T14.8XXA NONHEALING NONSURGICAL WOUND WITH FAT LAYER EXPOSED: Primary | ICD-10-CM

## 2022-07-26 DIAGNOSIS — L97.322 SKIN ULCER OF LEFT ANKLE WITH FAT LAYER EXPOSED (HCC): ICD-10-CM

## 2022-07-26 PROCEDURE — 11042 DBRDMT SUBQ TIS 1ST 20SQCM/<: CPT | Performed by: NURSE PRACTITIONER

## 2022-07-26 PROCEDURE — 11042 DBRDMT SUBQ TIS 1ST 20SQCM/<: CPT

## 2022-07-26 RX ORDER — BACITRACIN ZINC AND POLYMYXIN B SULFATE 500; 1000 [USP'U]/G; [USP'U]/G
OINTMENT TOPICAL ONCE
Status: CANCELLED | OUTPATIENT
Start: 2022-07-26 | End: 2022-07-26

## 2022-07-26 RX ORDER — BETAMETHASONE DIPROPIONATE 0.05 %
OINTMENT (GRAM) TOPICAL ONCE
Status: CANCELLED | OUTPATIENT
Start: 2022-07-26 | End: 2022-07-26

## 2022-07-26 RX ORDER — GENTAMICIN SULFATE 1 MG/G
OINTMENT TOPICAL ONCE
Status: CANCELLED | OUTPATIENT
Start: 2022-07-26 | End: 2022-07-26

## 2022-07-26 RX ORDER — CLOBETASOL PROPIONATE 0.5 MG/G
OINTMENT TOPICAL ONCE
Status: CANCELLED | OUTPATIENT
Start: 2022-07-26 | End: 2022-07-26

## 2022-07-26 RX ORDER — LIDOCAINE 40 MG/G
CREAM TOPICAL ONCE
Status: CANCELLED | OUTPATIENT
Start: 2022-07-26 | End: 2022-07-26

## 2022-07-26 RX ORDER — BACITRACIN, NEOMYCIN, POLYMYXIN B 400; 3.5; 5 [USP'U]/G; MG/G; [USP'U]/G
OINTMENT TOPICAL ONCE
Status: CANCELLED | OUTPATIENT
Start: 2022-07-26 | End: 2022-07-26

## 2022-07-26 RX ORDER — LIDOCAINE 50 MG/G
OINTMENT TOPICAL ONCE
Status: CANCELLED | OUTPATIENT
Start: 2022-07-26 | End: 2022-07-26

## 2022-07-26 RX ORDER — LIDOCAINE HYDROCHLORIDE 40 MG/ML
SOLUTION TOPICAL ONCE
Status: CANCELLED | OUTPATIENT
Start: 2022-07-26 | End: 2022-07-26

## 2022-07-26 RX ORDER — GINSENG 100 MG
CAPSULE ORAL ONCE
Status: CANCELLED | OUTPATIENT
Start: 2022-07-26 | End: 2022-07-26

## 2022-07-26 NOTE — PROGRESS NOTES
Multilayer Compression Wrap   (Not Unna) Below the Knee    NAME:  Sunday Hanley  YOB: 1970  MEDICAL RECORD NUMBER:  [id number]  DATE:  7/26/2022    Multilayer compression wrap: Removed old Multilayer wrap if indicated and wash leg with mild soap/water. Applied moisturizing agent to dry skin as needed. Applied primary and secondary dressing as ordered. Applied multilayered dressing below the knee to left lower leg. Instructed patient/caregiver not to remove dressing and to keep it clean and dry. Instructed patient/caregiver on complications to report to provider, such as pain, numbness in toes, heavy drainage, and slippage of dressing. Instructed patient on purpose of compression dressing and on activity and exercise recommendations.       Electronically signed by Natalie Fraser LPN on 7/85/7224 at 6:12 AM

## 2022-07-26 NOTE — PROGRESS NOTES
Wound Care Center Progress Note With Procedure    Leola Polo  AGE: 50 y.o. GENDER: male  : 1970  EPISODE DATE:  2022     Subjective:     Chief Complaint   Patient presents with    Wound Check     Left leg, ankle          HISTORY of PRESENT ILLNESS   Leola Polo is a 54 y.o. male who presents to the [address] [place] [address] for evaluation and treatment of Chronic non-healing surgical  ulcer(s) of the left lateral ankle. The condition is of moderate severity. The ulcer has been present since 22 post left ankle hardware removal (related to pain) per Dr. Baltazar Anglin. He initially had left ankle open reduction internal fixation left ankle per Dr. Baltazar Anglin 21. The underlying cause is thought to be nonhealing surgical.  The patients care to date has included evaluation and follow per Dr. Keith Freeman. The patient has significant underlying medical conditions as below. Wound Pain Timing/Severity: intermittent, mild  Quality of pain: dull, tender  Severity of pain:  2 / 10   Modifying Factors: edema, venous stasis, obesity and smoking  Associated Signs/Symptoms: edema, erythema, drainage and pain     KERI: Right 0.56, left 0.5 as of 5/10/22     Arterial evaluation: based on wound; locations, assessment and healing progress        Venous Evaluation: based on wound; locations, assessment and healing progress     Wound infection: wound culture obtained 5/10/22     Diabetes: No  Smoking: Yes (approximately 1 pack per day). Patient counseled in length on smoking cessation including benefits of quitting and health risks of continuing to smoke including but not limited to lung cancer, COPD, risk of coronary artery disease. Patient verbalized understanding today, is not ready to quit. Anticoagulant/Antiplatelet therapy: Low dose asprin  Immunosuppression: No  Obesity: Yes  Nutritional status:  well nourished.  Discussed need for increased protein and calories for wound healing and good sources of protein (just over 7 grams for every 20 pounds of body weight). Animal-based foods high in protein (meat, poultry, fish, eggs, and dairy foods). Plant based foods high in protein (tofu, lentils, beans, chickpeas, nuts, quinoa and abeba seeds. Other History:     Patient educated on the 6 essential components necessary for wound healing: Circulation, Debridements, Proper Dressings and Topical Wound Products, Infection Control, Edema Control and Offloading. Patient educated on those factors that negatively effect or impact wound healing: smoking, obesity, uncontrolled diabetes, anticoagulant and immunosuppressive regimens, inadequate nutrition, untreated arterial and venous disease if applicable and measures to manage edema.           PAST MEDICAL HISTORY        Diagnosis Date    Fracture     in ER 7/24/2021 fx left ankle- for surg 7/30/2021    History of alcohol abuse     Pericarditis     \"in 2015\" no longer forllow with cardiologist    Sleep apnea     had sleep study 2014- uses cpap       PAST SURGICAL HISTORY    Past Surgical History:   Procedure Laterality Date    ANKLE FRACTURE SURGERY Left 7/30/2021    LEFT ANKLE OPEN REDUCTION INTERNAL FIXATION performed by Eb Motley MD at [facility] Left 1/19/2022    LEFT ANKLE HARDWARE REMOVAL performed by Eb Motley MD at [facility]    Family History   Problem Relation Age of Onset    Breast Cancer Mother     Cancer Father         lung cancer       SOCIAL HISTORY    Social History     Tobacco Use    Smoking status: Every Day     Packs/day: 1.50     Years: 25.00     Pack years: 37.50     Types: Cigarettes    Smokeless tobacco: Never    Tobacco comments:     instructed adverse effects   Vaping Use    Vaping Use: Never used   Substance Use Topics    Alcohol use: Yes     Comment: average\" 5-6 beers per day\"    Drug use: Not Currently     Types: Marijuana Charmayne Stai)     Comment: \"20+ yrs ago \"       ALLERGIES    No Known Allergies    MEDICATIONS    Current Outpatient Medications on File Prior to Encounter   Medication Sig Dispense Refill    aspirin 325 MG EC tablet Take 1 tablet by mouth daily 30 tablet 0     No current facility-administered medications on file prior to encounter. REVIEW OF SYSTEMS    Pertinent items are noted in HPI. Constitutional: Negative for systemic symptoms including fever, chills and malaise. Objective:      BP (!) 152/93   Pulse 85   Temp 97.6 °F (36.4 °C) (Temporal)   Resp 16     PHYSICAL EXAM    General: The patient is in no acute distress. Mental status:  Patient is appropriate, is  oriented to place and plan of care. Dermatologic exam: Visual inspection of the periwound reveals the skin to be edematous  Wound exam: see wound description below in procedure note      Assessment:     Problem List Items Addressed This Visit          Other    WD-Nonhealing nonsurgical wound with fat layer exposed - Primary    Relevant Orders    Initiate Outpatient Wound Care Protocol    WD-Skin ulcer of ankle with fat layer exposed ([facility] [address]    Relevant Orders    Initiate Outpatient Wound Care Protocol     Procedure Note    Indications:  Based on my examination of this patient's wound(s) today, sharp excision into necrotic subcutaneous tissue is required to promote healing and evaluate the extent of previous healing. Performed by: KISHA Melchor CNP    Consent obtained: Yes    Time out taken:  Yes    Pain Control:       Debridement:Excisional Debridement    Using curette the wound(s) was/were sharply debrided down through and including the removal of subcutaneous tissue.         Devitalized Tissue Debrided:  slough and exudate    Pre Debridement Measurements:  Are located in the Wound Documentation Flow Sheet    All active wounds listed below with today's date are evaluated  Wound(s)    debrided this date include # : 1     Post  Debridement Measurements:  Wound 05/10/22 Pretibial Left;Lateral (Active)   Wound Image   07/05/22 0939   Wound Etiology Non-Healing Surgical 07/26/22 0939   Dressing Status New dressing applied 07/26/22 0939   Wound Cleansed Soap and water; Wound cleanser 07/26/22 0919   Offloading for Diabetic Foot Ulcers Offloading boot 07/26/22 0939   Wound Length (cm) 2.5 cm 07/26/22 0919   Wound Width (cm) 0.7 cm 07/26/22 0919   Wound Depth (cm) 0.2 cm 07/26/22 0919   Wound Surface Area (cm^2) 1.75 cm^2 07/26/22 0919   Change in Wound Size % (l*w) 82.05 07/26/22 0919   Wound Volume (cm^3) 0.35 cm^3 07/26/22 0919   Wound Healing % 82 07/26/22 0919   Post-Procedure Length (cm) 2.5 cm 07/26/22 0939   Post-Procedure Width (cm) 0.7 cm 07/26/22 0939   Post-Procedure Depth (cm) 0.2 cm 07/26/22 0939   Post-Procedure Surface Area (cm^2) 1.75 cm^2 07/26/22 0939   Post-Procedure Volume (cm^3) 0.35 cm^3 07/26/22 0939   Distance Tunneling (cm) 0 cm 07/26/22 0919   Tunneling Position ___ O'Clock 0 07/26/22 0919   Undermining Starts ___ O'Clock 0 07/26/22 0919   Undermining Ends___ O'Clock 0 07/26/22 0919   Undermining Maxium Distance (cm) 0 07/26/22 0919   Wound Assessment Pink/red 07/26/22 0919   Drainage Amount Moderate 07/26/22 0919   Drainage Description Serosanguinous 07/26/22 0919   Odor Mild 07/26/22 0919   Reyna-wound Assessment Intact; Maceration 07/26/22 0919   Margins Defined edges 07/26/22 0919   Wound Thickness Description not for Pressure Injury Full thickness 07/26/22 0919   Number of days: 77         Percent of Wound(s) Debrided: approximately 100%    Total  Area  Debrided: 1.75 sq cm     Bleeding:  Minimal    Hemostasis Achieved:  by pressure    Procedural Pain:  0  / 10     Post Procedural Pain:  0 / 10     Response to treatment:  Well tolerated by patient. Status of wound progress and description from last visit: Improving, continue regiment with compression wrap. Discontinue NPWT at this time.   Plan:       Discharge Instructions         PHYSICIAN ORDERS AND DISCHARGE INSTRUCTIONS     NOTE: Upon discharge from the [address] [facility],[address], you will receive a patient experience survey. We would be grateful if you would take the time to fill this survey out. Wound care order history:                 KERI's   Right  0.56     Left 0.5              Date: 5/10/22              Cultures:                 Grafts:  Applied for grafts 7/12/22              Antibiotics:           Continuing wound care orders and information:                 Residence:                Continue home health care with:              Your wound-care supplies will be provided by: Wound cleansing:              Do not scrub or use excessive force. Wash hands with soap and water before and after dressing changes. Prior to applying a clean dressing, cleanse wound with normal saline, wound cleanser, or mild soap and water. Ask the physician or nurse before getting the wound(s) wet in a shower     Daily Wound management:              Keep weight off wounds and reposition every 2 hours. Avoid standing for long periods of time. Apply wraps/stockings in AM and remove at bedtime. If swelling is present, elevate legs to the level of the heart or above for 30 minutes 4-5 times a day and/or when sitting. When taking antibiotics take entire prescription as ordered by physician do not stop taking until medicine is all gone. Orders for this week: 7/26/22        Culture of Left Lateral Ankle taken on 5/10/22. Rx: [facility] [place] [facility] in [place]        Left Lateral Ankle - Wash with soap and water, pat dry. Apply Small amount of Anasept and Stimulen to wound bed. Cover with super absorber (Sorbex). Wrap with Coban 2 Lite. Leave in place until Friday. WOUND VAC Discontinued 7/26/22. May return to [facility]. Nurse visits on Fridays to change Coban 2 Lite wrap.   Follow up with Lexie Pinto CNP in 1 week in the wound care

## 2022-07-26 NOTE — DISCHARGE INSTRUCTIONS
PHYSICIAN ORDERS AND DISCHARGE INSTRUCTIONS     NOTE: Upon discharge from the [address], you will receive a patient experience survey. We would be grateful if you would take the time to fill this survey out. Wound care order history:                 KERI's   Right  0.56     Left 0.5              Date: 5/10/22              Cultures:                 Grafts:  Applied for grafts 7/12/22              Antibiotics:           Continuing wound care orders and information:                 Residence:                Continue home health care with:              Your wound-care supplies will be provided by: Wound cleansing:              Do not scrub or use excessive force. Wash hands with soap and water before and after dressing changes. Prior to applying a clean dressing, cleanse wound with normal saline, wound cleanser, or mild soap and water. Ask the physician or nurse before getting the wound(s) wet in a shower     Daily Wound management:              Keep weight off wounds and reposition every 2 hours. Avoid standing for long periods of time. Apply wraps/stockings in AM and remove at bedtime. If swelling is present, elevate legs to the level of the heart or above for 30 minutes 4-5 times a day and/or when sitting. When taking antibiotics take entire prescription as ordered by physician do not stop taking until medicine is all gone. Orders for this week: 7/26/22        Culture of Left Lateral Ankle taken on 5/10/22. Rx: [facility] in [place]        Left Lateral Ankle - Wash with soap and water, pat dry. Apply Small amount of Anasept and Stimulen to wound bed. Cover with super absorber (Sorbex). Wrap with Coban 2 Lite. Leave in place until Friday. WOUND VAC Discontinued 7/26/22. May return to [facility].         Nurse visits on Fridays to change Coban 2 Lite wrap. Follow up with Carmel Burgess CNP in 1 week in the wound care center. Call [phone] for any questions or concerns.   Date__________   Time___________

## 2022-08-02 ENCOUNTER — HOSPITAL ENCOUNTER (OUTPATIENT)
Dept: WOUND CARE | Age: 52
Discharge: HOME | End: 2022-08-02
Payer: COMMERCIAL

## 2022-08-02 VITALS
HEART RATE: 96 BPM | TEMPERATURE: 98.7 F | SYSTOLIC BLOOD PRESSURE: 162 MMHG | DIASTOLIC BLOOD PRESSURE: 91 MMHG | RESPIRATION RATE: 18 BRPM

## 2022-08-02 DIAGNOSIS — I83.023 VENOUS STASIS ULCER OF LEFT ANKLE WITH FAT LAYER EXPOSED WITH VARICOSE VEINS (HCC): ICD-10-CM

## 2022-08-02 DIAGNOSIS — L97.322 VENOUS STASIS ULCER OF LEFT ANKLE WITH FAT LAYER EXPOSED WITH VARICOSE VEINS (HCC): ICD-10-CM

## 2022-08-02 DIAGNOSIS — L97.322 SKIN ULCER OF LEFT ANKLE WITH FAT LAYER EXPOSED (HCC): ICD-10-CM

## 2022-08-02 DIAGNOSIS — T14.8XXA NONHEALING NONSURGICAL WOUND WITH FAT LAYER EXPOSED: Primary | ICD-10-CM

## 2022-08-02 PROBLEM — I83.003 VENOUS STASIS ULCER OF ANKLE WITH FAT LAYER EXPOSED (HCC): Status: ACTIVE | Noted: 2022-08-02

## 2022-08-02 PROBLEM — L97.302 VENOUS STASIS ULCER OF ANKLE WITH FAT LAYER EXPOSED (HCC): Status: ACTIVE | Noted: 2022-08-02

## 2022-08-02 PROCEDURE — 11042 DBRDMT SUBQ TIS 1ST 20SQCM/<: CPT

## 2022-08-02 PROCEDURE — 11042 DBRDMT SUBQ TIS 1ST 20SQCM/<: CPT | Performed by: NURSE PRACTITIONER

## 2022-08-02 RX ORDER — LIDOCAINE HYDROCHLORIDE 40 MG/ML
SOLUTION TOPICAL ONCE
Status: CANCELLED | OUTPATIENT
Start: 2022-08-02 | End: 2022-08-02

## 2022-08-02 RX ORDER — CLOBETASOL PROPIONATE 0.5 MG/G
OINTMENT TOPICAL ONCE
Status: CANCELLED | OUTPATIENT
Start: 2022-08-02 | End: 2022-08-02

## 2022-08-02 RX ORDER — BETAMETHASONE DIPROPIONATE 0.05 %
OINTMENT (GRAM) TOPICAL ONCE
Status: CANCELLED | OUTPATIENT
Start: 2022-08-02 | End: 2022-08-02

## 2022-08-02 RX ORDER — BACITRACIN ZINC AND POLYMYXIN B SULFATE 500; 1000 [USP'U]/G; [USP'U]/G
OINTMENT TOPICAL ONCE
Status: CANCELLED | OUTPATIENT
Start: 2022-08-02 | End: 2022-08-02

## 2022-08-02 RX ORDER — LIDOCAINE 50 MG/G
OINTMENT TOPICAL ONCE
Status: CANCELLED | OUTPATIENT
Start: 2022-08-02 | End: 2022-08-02

## 2022-08-02 RX ORDER — GENTAMICIN SULFATE 1 MG/G
OINTMENT TOPICAL ONCE
Status: CANCELLED | OUTPATIENT
Start: 2022-08-02 | End: 2022-08-02

## 2022-08-02 RX ORDER — LIDOCAINE 40 MG/G
CREAM TOPICAL ONCE
Status: CANCELLED | OUTPATIENT
Start: 2022-08-02 | End: 2022-08-02

## 2022-08-02 RX ORDER — BACITRACIN, NEOMYCIN, POLYMYXIN B 400; 3.5; 5 [USP'U]/G; MG/G; [USP'U]/G
OINTMENT TOPICAL ONCE
Status: CANCELLED | OUTPATIENT
Start: 2022-08-02 | End: 2022-08-02

## 2022-08-02 RX ORDER — GINSENG 100 MG
CAPSULE ORAL ONCE
Status: CANCELLED | OUTPATIENT
Start: 2022-08-02 | End: 2022-08-02

## 2022-08-02 ASSESSMENT — PAIN SCALES - GENERAL: PAINLEVEL_OUTOF10: 0

## 2022-08-02 NOTE — PROGRESS NOTES
Wound Care Center Progress Note With Procedure    Enio Harvey  AGE: 50 y.o. GENDER: male  : 1970  EPISODE DATE:  2022     Subjective:     Chief Complaint   Patient presents with    Wound Check     Left ankle         HISTORY of PRESENT ILLNESS   Enio Harvey is a 54 y.o. male who presents to the [address] [place] [address] for evaluation and treatment of Chronic non-healing surgical  ulcer(s) of the left lateral ankle. The condition is of moderate severity. The ulcer has been present since 22 post left ankle hardware removal (related to pain) per Dr. Verona Ugalde. He initially had left ankle open reduction internal fixation left ankle per Dr. Verona Ugalde 21. The underlying cause was initially nonhealing surgical but related to the length of time the wound has been present and venous components to his wound it is now venous in nature with edema. The patients care to date has included evaluation and follow per Dr. Fernando Comer. The patient has significant underlying medical conditions as below. Wound Pain Timing/Severity: intermittent, mild  Quality of pain: dull, tender  Severity of pain:  2 / 10   Modifying Factors: edema, venous stasis, obesity and smoking  Associated Signs/Symptoms: edema, erythema, drainage and pain     KERI: Right 0.56, left 0.5 as of 5/10/22     Arterial evaluation: based on wound; locations, assessment and healing progress        Venous Evaluation: based on wound; locations, assessment and healing progress     Wound infection: wound culture obtained 5/10/22     Diabetes: No  Smoking: Yes (approximately 1 pack per day). Patient counseled in length on smoking cessation including benefits of quitting and health risks of continuing to smoke including but not limited to lung cancer, COPD, risk of coronary artery disease. Patient verbalized understanding today, is not ready to quit.    Anticoagulant/Antiplatelet therapy: Low dose asprin  Immunosuppression: No  Obesity: Yes  Nutritional status:  well nourished. Discussed need for increased protein and calories for wound healing and good sources of protein (just over 7 grams for every 20 pounds of body weight). Animal-based foods high in protein (meat, poultry, fish, eggs, and dairy foods). Plant based foods high in protein (tofu, lentils, beans, chickpeas, nuts, quinoa and abeba seeds. Other History:     Patient educated on the 6 essential components necessary for wound healing: Circulation, Debridements, Proper Dressings and Topical Wound Products, Infection Control, Edema Control and Offloading. Patient educated on those factors that negatively effect or impact wound healing: smoking, obesity, uncontrolled diabetes, anticoagulant and immunosuppressive regimens, inadequate nutrition, untreated arterial and venous disease if applicable and measures to manage edema.           PAST MEDICAL HISTORY        Diagnosis Date    Fracture     in ER 7/24/2021 fx left ankle- for surg 7/30/2021    History of alcohol abuse     Pericarditis     \"in 2015\" no longer forllow with cardiologist    Sleep apnea     had sleep study 2014- uses cpap       PAST SURGICAL HISTORY    Past Surgical History:   Procedure Laterality Date    ANKLE FRACTURE SURGERY Left 7/30/2021    LEFT ANKLE OPEN REDUCTION INTERNAL FIXATION performed by Mahnaz Garcia MD at [address] [place] [address] Left 1/19/2022    LEFT ANKLE HARDWARE REMOVAL performed by Mahnaz Garcia MD at [address] [place] [address]    Family History   Problem Relation Age of Onset    Breast Cancer Mother     Cancer Father         lung cancer       SOCIAL HISTORY    Social History     Tobacco Use    Smoking status: Every Day     Packs/day: 1.50     Years: 25.00     Pack years: 37.50     Types: Cigarettes    Smokeless tobacco: Never    Tobacco comments:     instructed adverse effects   Vaping Use    Vaping Use: Never used   Substance Use Topics    Alcohol use: Yes     Comment: average\" 5-6 beers per day\"    Drug use: Not Currently Types: Marijuana (Weed)     Comment: \"20+ yrs ago \"       ALLERGIES    No Known Allergies    MEDICATIONS    Current Outpatient Medications on File Prior to Encounter   Medication Sig Dispense Refill    aspirin 325 MG EC tablet Take 1 tablet by mouth daily 30 tablet 0     No current facility-administered medications on file prior to encounter. REVIEW OF SYSTEMS    Pertinent items are noted in HPI. Constitutional: Negative for systemic symptoms including fever, chills and malaise. Objective:      BP (!) 162/91   Pulse 96   Temp 98.7 °F (37.1 °C) (Temporal)   Resp 18     PHYSICAL EXAM    General: The patient is in no acute distress. Mental status:  Patient is appropriate, is  oriented to place and plan of care. Dermatologic exam: Visual inspection of the periwound reveals the skin to be edematous  Wound exam: see wound description below in procedure note      Assessment:     Problem List Items Addressed This Visit          Other    WD-Nonhealing nonsurgical wound with fat layer exposed - Primary    Relevant Orders    Initiate Outpatient Wound Care Protocol    WD-Skin ulcer of ankle with fat layer exposed ([address]    Relevant Orders    Initiate Outpatient Wound Care Protocol    WD-Venous stasis ulcer of ankle with fat layer exposed ([address]     Procedure Note    Indications:  Based on my examination of this patient's wound(s) today, sharp excision into necrotic subcutaneous tissue is required to promote healing and evaluate the extent of previous healing. Performed by: KISHA Reagan CNP    Consent obtained: Yes    Time out taken:  Yes    Pain Control:       Debridement:Excisional Debridement    Using curette the wound(s) was/were sharply debrided down through and including the removal of subcutaneous tissue.         Devitalized Tissue Debrided:  slough and exudate    Pre Debridement Measurements:  Are located in the Wound Documentation Flow Sheet    All active wounds listed below with today's date are evaluated  Wound(s)    debrided this date include # : 1     Post  Debridement Measurements:  Wound 05/10/22, #1 Pretibial Left;Lateral (Active)   Wound Image   08/02/22 1016   Wound Etiology Non-Healing Surgical 08/02/22 1016   Dressing Status New dressing applied 07/26/22 0939   Wound Cleansed Wound cleanser; Soap and water 08/02/22 1016   Offloading for Diabetic Foot Ulcers Offloading boot 08/02/22 1016   Wound Length (cm) 3 cm 08/02/22 1016   Wound Width (cm) 0.6 cm 08/02/22 1016   Wound Depth (cm) 0.5 cm 08/02/22 1016   Wound Surface Area (cm^2) 1.8 cm^2 08/02/22 1016   Change in Wound Size % (l*w) 81.54 08/02/22 1016   Wound Volume (cm^3) 0.9 cm^3 08/02/22 1016   Wound Healing % 54 08/02/22 1016   Post-Procedure Length (cm) 3 cm 08/02/22 1025   Post-Procedure Width (cm) 0.6 cm 08/02/22 1025   Post-Procedure Depth (cm) 0.5 cm 08/02/22 1025   Post-Procedure Surface Area (cm^2) 1.8 cm^2 08/02/22 1025   Post-Procedure Volume (cm^3) 0.9 cm^3 08/02/22 1025   Distance Tunneling (cm) 0 cm 08/02/22 1016   Tunneling Position ___ O'Clock 0 08/02/22 1016   Undermining Starts ___ O'Clock 0 08/02/22 1016   Undermining Ends___ O'Clock 0 08/02/22 1016   Undermining [place] Distance (cm) 0 08/02/22 1016   Wound Assessment [place]/red;Slough 08/02/22 1016   Drainage Amount Moderate 08/02/22 1016   Drainage Description Serosanguinous 08/02/22 1016   Odor None 08/02/22 1016   Reyna-wound Assessment Intact; Maceration 08/02/22 1016   Margins Defined edges 08/02/22 1016   Wound Thickness Description not for Pressure Injury Full thickness 08/02/22 1016   Number of days: 84     Percent of Wound(s) Debrided: approximately 100%    Total  Area  Debrided: 1.8 sq cm     Bleeding:  Minimal    Hemostasis Achieved:  by pressure    Procedural Pain:  0  / 10     Post Procedural Pain:  0 / 10     Response to treatment:  Well tolerated by patient.      Status of wound progress and description from last visit: Improving, continue regiment with compression wrap. NPWT has been discontinued. Plan:       Discharge Instructions         PHYSICIAN ORDERS AND DISCHARGE INSTRUCTIONS     NOTE: Upon discharge from the [address], you will receive a patient experience survey. We would be grateful if you would take the time to fill this survey out. Wound care order history:                 KERI's   Right  0.56     Left 0.5              Date: 5/10/22              Cultures:                 Grafts:  Applied for grafts 7/12/22              Antibiotics:           Continuing wound care orders and information:                 Residence:                Continue home health care with:              Your wound-care supplies will be provided by: Wound cleansing:              Do not scrub or use excessive force. Wash hands with soap and water before and after dressing changes. Prior to applying a clean dressing, cleanse wound with normal saline, wound cleanser, or mild soap and water. Ask the physician or nurse before getting the wound(s) wet in a shower     Daily Wound management:              Keep weight off wounds and reposition every 2 hours. Avoid standing for long periods of time. Apply wraps/stockings in AM and remove at bedtime. If swelling is present, elevate legs to the level of the heart or above for 30 minutes 4-5 times a day and/or when sitting. When taking antibiotics take entire prescription as ordered by physician do not stop taking until medicine is all gone. Orders for this week: 8/2/22        Culture of Left Lateral Ankle taken on 5/10/22. Rx: [facility] in [place]        Left Lateral Ankle - Wash with soap and water, pat dry. Apply Small amount of Anasept and Stimulen to wound bed. Cover with super absorber (Sorbex). Wrap with Coban 2. Leave in place until Friday.        WOUND VAC Discontinued 7/26/22. May return to [facility]. Nurse visits on Fridays to change Coban 2. Follow up with Leo Wagner CNP in 1 week in the wound care center. Call [phone] for any questions or concerns.   Date__________   Time___________      Treatment Note      Written Patient Dismissal Instructions Given            Electronically signed by KISHA Ventura CNP on 8/2/2022 at 10:39 AM

## 2022-08-02 NOTE — PROGRESS NOTES
Multilayer Compression Wrap   (Not Unna) Below the Knee    NAME:  Dayday Blanca  YOB: 1970  MEDICAL RECORD NUMBER:  [id number]  DATE:  8/2/2022    Multilayer compression wrap: Removed old Multilayer wrap if indicated and wash leg with mild soap/water. Applied moisturizing agent to dry skin as needed. Applied primary and secondary dressing as ordered. Applied multilayered dressing below the knee to left lower leg. Instructed patient/caregiver not to remove dressing and to keep it clean and dry. Instructed patient/caregiver on complications to report to provider, such as pain, numbness in toes, heavy drainage, and slippage of dressing. Instructed patient on purpose of compression dressing and on activity and exercise recommendations.       Electronically signed by Bella Lemons RN on 8/2/2022 at 10:46 AM

## 2022-08-02 NOTE — DISCHARGE INSTRUCTIONS
PHYSICIAN ORDERS AND DISCHARGE INSTRUCTIONS     NOTE: Upon discharge from the [address], you will receive a patient experience survey. We would be grateful if you would take the time to fill this survey out. Wound care order history:                 KERI's   Right  0.56     Left 0.5              Date: 5/10/22              Cultures:                 Grafts:  Applied for grafts 7/12/22              Antibiotics:           Continuing wound care orders and information:                 Residence:                Continue home health care with:              Your wound-care supplies will be provided by: Wound cleansing:              Do not scrub or use excessive force. Wash hands with soap and water before and after dressing changes. Prior to applying a clean dressing, cleanse wound with normal saline, wound cleanser, or mild soap and water. Ask the physician or nurse before getting the wound(s) wet in a shower     Daily Wound management:              Keep weight off wounds and reposition every 2 hours. Avoid standing for long periods of time. Apply wraps/stockings in AM and remove at bedtime. If swelling is present, elevate legs to the level of the heart or above for 30 minutes 4-5 times a day and/or when sitting. When taking antibiotics take entire prescription as ordered by physician do not stop taking until medicine is all gone. Orders for this week: 8/2/22        Culture of Left Lateral Ankle taken on 5/10/22. Rx: [facility] in [place]        Left Lateral Ankle - Wash with soap and water, pat dry. Apply Small amount of Anasept and Stimulen to wound bed. Cover with super absorber (Sorbex). Wrap with Coban 2. Leave in place until Friday. WOUND VAC Discontinued 7/26/22. May return to [facility].         Nurse visits on Fridays to change Coban 2.  Follow up with Micaela Germain CNP in 1 week in the wound care center. Call [phone] for any questions or concerns.   Date__________   Time___________

## 2022-08-05 ENCOUNTER — HOSPITAL ENCOUNTER (OUTPATIENT)
Dept: WOUND CARE | Age: 52
Discharge: HOME | End: 2022-08-05
Payer: COMMERCIAL

## 2022-08-05 VITALS
DIASTOLIC BLOOD PRESSURE: 83 MMHG | RESPIRATION RATE: 18 BRPM | TEMPERATURE: 97.2 F | HEART RATE: 78 BPM | SYSTOLIC BLOOD PRESSURE: 175 MMHG

## 2022-08-05 DIAGNOSIS — T14.8XXA NONHEALING NONSURGICAL WOUND WITH FAT LAYER EXPOSED: Primary | ICD-10-CM

## 2022-08-05 DIAGNOSIS — L97.322 SKIN ULCER OF LEFT ANKLE WITH FAT LAYER EXPOSED (HCC): ICD-10-CM

## 2022-08-05 PROCEDURE — 29581 APPL MULTLAYER CMPRN SYS LEG: CPT

## 2022-08-05 RX ORDER — LIDOCAINE 40 MG/G
CREAM TOPICAL ONCE
Status: CANCELLED | OUTPATIENT
Start: 2022-08-05 | End: 2022-08-05

## 2022-08-05 RX ORDER — LIDOCAINE 50 MG/G
OINTMENT TOPICAL ONCE
Status: CANCELLED | OUTPATIENT
Start: 2022-08-05 | End: 2022-08-05

## 2022-08-05 RX ORDER — BACITRACIN, NEOMYCIN, POLYMYXIN B 400; 3.5; 5 [USP'U]/G; MG/G; [USP'U]/G
OINTMENT TOPICAL ONCE
Status: CANCELLED | OUTPATIENT
Start: 2022-08-05 | End: 2022-08-05

## 2022-08-05 RX ORDER — COVID-19 ANTIGEN TEST
1 KIT MISCELLANEOUS DAILY PRN
COMMUNITY

## 2022-08-05 RX ORDER — GENTAMICIN SULFATE 1 MG/G
OINTMENT TOPICAL ONCE
Status: CANCELLED | OUTPATIENT
Start: 2022-08-05 | End: 2022-08-05

## 2022-08-05 RX ORDER — LIDOCAINE HYDROCHLORIDE 40 MG/ML
SOLUTION TOPICAL ONCE
Status: CANCELLED | OUTPATIENT
Start: 2022-08-05 | End: 2022-08-05

## 2022-08-05 RX ORDER — GINSENG 100 MG
CAPSULE ORAL ONCE
Status: CANCELLED | OUTPATIENT
Start: 2022-08-05 | End: 2022-08-05

## 2022-08-05 RX ORDER — BACITRACIN ZINC AND POLYMYXIN B SULFATE 500; 1000 [USP'U]/G; [USP'U]/G
OINTMENT TOPICAL ONCE
Status: CANCELLED | OUTPATIENT
Start: 2022-08-05 | End: 2022-08-05

## 2022-08-05 RX ORDER — BETAMETHASONE DIPROPIONATE 0.05 %
OINTMENT (GRAM) TOPICAL ONCE
Status: CANCELLED | OUTPATIENT
Start: 2022-08-05 | End: 2022-08-05

## 2022-08-05 RX ORDER — CLOBETASOL PROPIONATE 0.5 MG/G
OINTMENT TOPICAL ONCE
Status: CANCELLED | OUTPATIENT
Start: 2022-08-05 | End: 2022-08-05

## 2022-08-05 ASSESSMENT — PAIN SCALES - GENERAL: PAINLEVEL_OUTOF10: 0

## 2022-08-05 NOTE — PROGRESS NOTES
Multilayer Compression Wrap   (Not Unna) Below the Knee    NAME:  Craig Harris  YOB: 1970  MEDICAL RECORD NUMBER:  [id number]  DATE:  8/5/2022    Multilayer compression wrap: Removed old Multilayer wrap if indicated and wash leg with mild soap/water. Applied moisturizing agent to dry skin as needed. Applied primary and secondary dressing as ordered. Applied multilayered dressing below the knee to left lower leg. Instructed patient/caregiver not to remove dressing and to keep it clean and dry. Instructed patient/caregiver on complications to report to provider, such as pain, numbness in toes, heavy drainage, and slippage of dressing. Instructed patient on purpose of compression dressing and on activity and exercise recommendations.       Electronically signed by Sergio Grant RN on 8/5/2022 at 9:37 AM

## 2022-08-09 ENCOUNTER — HOSPITAL ENCOUNTER (OUTPATIENT)
Dept: WOUND CARE | Age: 52
Discharge: HOME | End: 2022-08-09
Payer: COMMERCIAL

## 2022-08-09 VITALS
RESPIRATION RATE: 20 BRPM | TEMPERATURE: 97.7 F | SYSTOLIC BLOOD PRESSURE: 144 MMHG | DIASTOLIC BLOOD PRESSURE: 90 MMHG | HEART RATE: 91 BPM

## 2022-08-09 DIAGNOSIS — L97.322 VENOUS STASIS ULCER OF LEFT ANKLE WITH FAT LAYER EXPOSED WITH VARICOSE VEINS (HCC): Primary | ICD-10-CM

## 2022-08-09 DIAGNOSIS — L97.322 SKIN ULCER OF LEFT ANKLE WITH FAT LAYER EXPOSED (HCC): ICD-10-CM

## 2022-08-09 DIAGNOSIS — I83.023 VENOUS STASIS ULCER OF LEFT ANKLE WITH FAT LAYER EXPOSED WITH VARICOSE VEINS (HCC): Primary | ICD-10-CM

## 2022-08-09 DIAGNOSIS — T14.8XXA NONHEALING NONSURGICAL WOUND WITH FAT LAYER EXPOSED: ICD-10-CM

## 2022-08-09 PROCEDURE — 11042 DBRDMT SUBQ TIS 1ST 20SQCM/<: CPT | Performed by: NURSE PRACTITIONER

## 2022-08-09 PROCEDURE — 11042 DBRDMT SUBQ TIS 1ST 20SQCM/<: CPT

## 2022-08-09 RX ORDER — LIDOCAINE 40 MG/G
CREAM TOPICAL ONCE
Status: CANCELLED | OUTPATIENT
Start: 2022-08-09 | End: 2022-08-09

## 2022-08-09 RX ORDER — BACITRACIN ZINC AND POLYMYXIN B SULFATE 500; 1000 [USP'U]/G; [USP'U]/G
OINTMENT TOPICAL ONCE
Status: CANCELLED | OUTPATIENT
Start: 2022-08-09 | End: 2022-08-09

## 2022-08-09 RX ORDER — CLOBETASOL PROPIONATE 0.5 MG/G
OINTMENT TOPICAL ONCE
Status: CANCELLED | OUTPATIENT
Start: 2022-08-09 | End: 2022-08-09

## 2022-08-09 RX ORDER — BACITRACIN, NEOMYCIN, POLYMYXIN B 400; 3.5; 5 [USP'U]/G; MG/G; [USP'U]/G
OINTMENT TOPICAL ONCE
Status: CANCELLED | OUTPATIENT
Start: 2022-08-09 | End: 2022-08-09

## 2022-08-09 RX ORDER — BETAMETHASONE DIPROPIONATE 0.05 %
OINTMENT (GRAM) TOPICAL ONCE
Status: CANCELLED | OUTPATIENT
Start: 2022-08-09 | End: 2022-08-09

## 2022-08-09 RX ORDER — LIDOCAINE 50 MG/G
OINTMENT TOPICAL ONCE
Status: CANCELLED | OUTPATIENT
Start: 2022-08-09 | End: 2022-08-09

## 2022-08-09 RX ORDER — GINSENG 100 MG
CAPSULE ORAL ONCE
Status: CANCELLED | OUTPATIENT
Start: 2022-08-09 | End: 2022-08-09

## 2022-08-09 RX ORDER — LIDOCAINE HYDROCHLORIDE 40 MG/ML
SOLUTION TOPICAL ONCE
Status: CANCELLED | OUTPATIENT
Start: 2022-08-09 | End: 2022-08-09

## 2022-08-09 RX ORDER — GENTAMICIN SULFATE 1 MG/G
OINTMENT TOPICAL ONCE
Status: CANCELLED | OUTPATIENT
Start: 2022-08-09 | End: 2022-08-09

## 2022-08-09 NOTE — DISCHARGE INSTRUCTIONS
PHYSICIAN ORDERS AND DISCHARGE INSTRUCTIONS     NOTE: Upon discharge from the [address], you will receive a patient experience survey. We would be grateful if you would take the time to fill this survey out. Wound care order history:                 KERI's   Right  0.56     Left 0.5              Date: 5/10/22              Cultures:                 Grafts:  Applied for grafts 7/12/22              Antibiotics:           Continuing wound care orders and information:                 Residence:                Continue home health care with:              Your wound-care supplies will be provided by: Wound cleansing:              Do not scrub or use excessive force. Wash hands with soap and water before and after dressing changes. Prior to applying a clean dressing, cleanse wound with normal saline, wound cleanser, or mild soap and water. Ask the physician or nurse before getting the wound(s) wet in a shower     Daily Wound management:              Keep weight off wounds and reposition every 2 hours. Avoid standing for long periods of time. Apply wraps/stockings in AM and remove at bedtime. If swelling is present, elevate legs to the level of the heart or above for 30 minutes 4-5 times a day and/or when sitting. When taking antibiotics take entire prescription as ordered by physician do not stop taking until medicine is all gone. Orders for this week: 8/9/22        Culture of Left Lateral Ankle taken on 5/10/22. Rx: [facility] in [place]        Left Lateral Ankle - Wash with soap and water, pat dry. Apply Small amount of Anasept and Stimulen to wound bed. Cover with super absorber (Sorbex). Wrap with Coban 2. Leave in place until next week        WOUND VAC Discontinued 7/26/22. May return to [facility].           Follow up with Rosalva Ramsey, CNP in 1 week in the wound care center. Call [phone] for any questions or concerns.   Date__________   Time___________

## 2022-08-09 NOTE — PROGRESS NOTES
Wound Care Center Progress Note With Procedure    Edwige Mondragon  AGE: 50 y.o. GENDER: male  : 1970  EPISODE DATE:  2022     Subjective:     Chief Complaint   Patient presents with    Wound Check     Left leg         HISTORY of PRESENT ILLNESS     Edwige Mondragon is a 54 y.o. male who presents to the [address] [place] [address] for evaluation and treatment of Chronic non-healing surgical  ulcer(s) of the left lateral ankle. The condition is of moderate severity. The ulcer has been present since 22 post left ankle hardware removal (related to pain) per Dr. Justo Kemp. He initially had left ankle open reduction internal fixation left ankle per Dr. Justo Kemp 21. The underlying cause was initially nonhealing surgical but related to the length of time the wound has been present and venous components to his wound it is now venous in nature with edema. The patients care to date has included evaluation and follow per Dr. Maddie Lang. The patient has significant underlying medical conditions as below. Continued improvement. Wound is measuring smaller  Patient asking about stopping RN visits and coming weekly      Wound Pain Timing/Severity: intermittent, mild  Quality of pain: dull, tender  Severity of pain:  2 / 10  Modifying Factors: edema, venous stasis, obesity and smoking  Associated Signs/Symptoms: edema, erythema, drainage and pain     KERI: Right 0.56, left 0.5 as of 5/10/22     Arterial evaluation: based on wound; locations, assessment and healing progress        Venous Evaluation: based on wound; locations, assessment and healing progress     Wound infection: wound culture obtained 5/10/22     Patient educated on the 6 essential components necessary for wound healing: Circulation, Debridements, Proper Dressings and Topical Wound Products, Infection Control, Edema Control and Offloading.      Patient educated on those factors that negatively effect or impact wound healing: smoking, obesity, uncontrolled diabetes, anticoagulant and immunosuppressive regimens, inadequate nutrition, untreated arterial and venous disease if applicable and measures to manage edema. PAST MEDICAL HISTORY        Diagnosis Date    Fracture     in ER 7/24/2021 fx left ankle- for surg 7/30/2021    History of alcohol abuse     Pericarditis     \"in 2015\" no longer forllow with cardiologist    Sleep apnea     had sleep study 2014- uses cpap       PAST SURGICAL HISTORY    Past Surgical History:   Procedure Laterality Date    ANKLE FRACTURE SURGERY Left 7/30/2021    LEFT ANKLE OPEN REDUCTION INTERNAL FIXATION performed by Shari Tolbert MD at [facility] Left 1/19/2022    LEFT ANKLE HARDWARE REMOVAL performed by Shari Tolbert MD at [facility]    Family History   Problem Relation Age of Onset    Breast Cancer Mother     Cancer Father         lung cancer       SOCIAL HISTORY    Social History     Tobacco Use    Smoking status: Every Day     Packs/day: 1.50     Years: 25.00     Pack years: 37.50     Types: Cigarettes    Smokeless tobacco: Never    Tobacco comments:     instructed adverse effects   Vaping Use    Vaping Use: Never used   Substance Use Topics    Alcohol use: Yes     Comment: average\" 5-6 beers per day\"    Drug use: Not Currently     Types: Marijuana Nicole Jan)     Comment: \"20+ yrs ago \"       ALLERGIES    No Known Allergies    MEDICATIONS    Current Outpatient Medications on File Prior to Encounter   Medication Sig Dispense Refill    Naproxen Sodium 220 MG CAPS Take 1 tablet by mouth daily as needed for Pain       No current facility-administered medications on file prior to encounter. REVIEW OF SYSTEMS    Pertinent items are noted in HPI. Constitutional: Negative for systemic symptoms including fever, chills and malaise. Objective:      BP (!) 144/90   Pulse 91   Temp 97.7 °F (36.5 °C) (Temporal)   Resp 20     PHYSICAL EXAM      General: The patient is in no acute distress.     Mental status: Patient is appropriate, is  oriented to place and plan of care. Dermatologic exam: Visual inspection of the periwound reveals the skin to be normal in turgor and texture and dry  Wound exam: see wound description below in procedure note      Assessment:     Problem List Items Addressed This Visit          Other    WD-Nonhealing nonsurgical wound with fat layer exposed    Relevant Orders    Initiate Outpatient Wound Care Protocol    WD-Skin ulcer of ankle with fat layer exposed ([address]    Relevant Orders    Initiate Outpatient Wound Care Protocol    WD-Venous stasis ulcer of ankle with fat layer exposed ([address] - Primary     Procedure Note    Indications:  Based on my examination of this patient's wound(s) today, sharp excision into necrotic subcutaneous tissue is required to promote healing and evaluate the extent of previous healing. Performed by: KISHA Beckwith CNP    Consent obtained: Yes    Time out taken:  Yes    Pain Control: N/A      Debridement:Excisional Debridement    Using curette the wound(s) was/were sharply debrided down through and including the removal of subcutaneous tissue.         Devitalized Tissue Debrided:  fibrin, biofilm, slough, and exudate    Pre Debridement Measurements:  Are located in the Wound Documentation Flow Sheet    All active wounds listed below with today's date are evaluated  Wound(s)    debrided this date include # : 1     Post  Debridement Measurements:  Wound 05/10/22 Pretibial Left;Lateral (Active)   Wound Image   08/09/22 0930   Wound Etiology Non-Healing Surgical 08/09/22 0930   Dressing Status New dressing applied 08/05/22 0936   Wound Cleansed Soap and water 08/09/22 0930   Offloading for Diabetic Foot Ulcers Offloading boot 08/02/22 1016   Wound Length (cm) 2 cm 08/09/22 0930   Wound Width (cm) 0.3 cm 08/09/22 0930   Wound Depth (cm) 0.4 cm 08/09/22 0930   Wound Surface Area (cm^2) 0.6 cm^2 08/09/22 0930   Change in Wound Size % (l*w) 93.85 08/09/22 0930   Wound Volume (cm^3) 0.24 cm^3 08/09/22 0930   Wound Healing % 88 08/09/22 0930   Post-Procedure Length (cm) 2 cm 08/09/22 0948   Post-Procedure Width (cm) 0.3 cm 08/09/22 0948   Post-Procedure Depth (cm) 0.4 cm 08/09/22 0948   Post-Procedure Surface Area (cm^2) 0.6 cm^2 08/09/22 0948   Post-Procedure Volume (cm^3) 0.24 cm^3 08/09/22 0948   Distance Tunneling (cm) 0 cm 08/09/22 0930   Tunneling Position ___ O'Clock 0 08/09/22 0930   Undermining Starts ___ O'Clock 0 08/09/22 0930   Undermining Ends___ O'Clock 0 08/09/22 0930   Undermining Maxium Distance (cm) 0 08/09/22 0930   Wound Assessment Pink/red 08/09/22 0930   Drainage Amount Moderate 08/09/22 0930   Drainage Description Serosanguinous 08/09/22 0930   Odor None 08/09/22 0930   Reyna-wound Assessment Intact 08/09/22 0930   Margins Defined edges 08/09/22 0930   Wound Thickness Description not for Pressure Injury Full thickness 08/09/22 0930   Number of days: 91       Percent of Wound(s) Debrided: approximately 100%    Total  Area  Debrided:  0.6 sq cm     Bleeding:  Minimal    Hemostasis Achieved:  by pressure    Procedural Pain:  2  / 10     Post Procedural Pain:  0 / 10     Response to treatment:  Well tolerated by patient. Status of wound progress and description from last visit:   Improving. Patient will start coming weekly from now on, no need for RN visit      Plan:       Discharge Instructions         [address]     NOTE: Upon discharge from the [address], you will receive a patient experience survey. We would be grateful if you would take the time to fill this survey out.      Wound care order history:                 KERI's   Right  0.56     Left 0.5              Date: 5/10/22              Cultures:                 Grafts:  Applied for grafts 7/12/22              Antibiotics:           Continuing wound care orders and information:                 Residence:                Continue home health care with:              Your wound-care supplies will be provided by: Wound cleansing:              Do not scrub or use excessive force. Wash hands with soap and water before and after dressing changes. Prior to applying a clean dressing, cleanse wound with normal saline, wound cleanser, or mild soap and water. Ask the physician or nurse before getting the wound(s) wet in a shower     Daily Wound management:              Keep weight off wounds and reposition every 2 hours. Avoid standing for long periods of time. Apply wraps/stockings in AM and remove at bedtime. If swelling is present, elevate legs to the level of the heart or above for 30 minutes 4-5 times a day and/or when sitting. When taking antibiotics take entire prescription as ordered by physician do not stop taking until medicine is all gone. Orders for this week: 8/9/22        Culture of Left Lateral Ankle taken on 5/10/22. Rx: [facility] in [place]        Left Lateral Ankle - Wash with soap and water, pat dry. Apply Small amount of Anasept and Stimulen to wound bed. Cover with super absorber (Sorbex). Wrap with Coban 2. Leave in place until next week        WOUND VAC Discontinued 7/26/22. May return to [facility]. Follow up with Anushka Quinonez CNP in 1 week in the wound care center. Call [phone] for any questions or concerns.   Date__________   Time___________        Treatment Note      Written Patient Dismissal Instructions Given            Electronically signed by KISHA Lauren CNP on 8/9/2022 at 9:50 AM

## 2022-08-16 ENCOUNTER — HOSPITAL ENCOUNTER (OUTPATIENT)
Dept: WOUND CARE | Age: 52
Discharge: HOME | End: 2022-08-16
Payer: COMMERCIAL

## 2022-08-16 VITALS
HEART RATE: 68 BPM | SYSTOLIC BLOOD PRESSURE: 169 MMHG | TEMPERATURE: 97.8 F | RESPIRATION RATE: 18 BRPM | DIASTOLIC BLOOD PRESSURE: 82 MMHG

## 2022-08-16 DIAGNOSIS — L97.322 VENOUS STASIS ULCER OF LEFT ANKLE WITH FAT LAYER EXPOSED WITH VARICOSE VEINS (HCC): ICD-10-CM

## 2022-08-16 DIAGNOSIS — I83.023 VENOUS STASIS ULCER OF LEFT ANKLE WITH FAT LAYER EXPOSED WITH VARICOSE VEINS (HCC): ICD-10-CM

## 2022-08-16 DIAGNOSIS — L97.322 SKIN ULCER OF LEFT ANKLE WITH FAT LAYER EXPOSED (HCC): ICD-10-CM

## 2022-08-16 DIAGNOSIS — T14.8XXA NONHEALING NONSURGICAL WOUND WITH FAT LAYER EXPOSED: Primary | ICD-10-CM

## 2022-08-16 PROCEDURE — 11042 DBRDMT SUBQ TIS 1ST 20SQCM/<: CPT

## 2022-08-16 PROCEDURE — 11042 DBRDMT SUBQ TIS 1ST 20SQCM/<: CPT | Performed by: NURSE PRACTITIONER

## 2022-08-16 RX ORDER — LIDOCAINE 40 MG/G
CREAM TOPICAL ONCE
Status: CANCELLED | OUTPATIENT
Start: 2022-08-16 | End: 2022-08-16

## 2022-08-16 RX ORDER — GINSENG 100 MG
CAPSULE ORAL ONCE
Status: CANCELLED | OUTPATIENT
Start: 2022-08-16 | End: 2022-08-16

## 2022-08-16 RX ORDER — BETAMETHASONE DIPROPIONATE 0.05 %
OINTMENT (GRAM) TOPICAL ONCE
Status: CANCELLED | OUTPATIENT
Start: 2022-08-16 | End: 2022-08-16

## 2022-08-16 RX ORDER — BACITRACIN ZINC AND POLYMYXIN B SULFATE 500; 1000 [USP'U]/G; [USP'U]/G
OINTMENT TOPICAL ONCE
Status: CANCELLED | OUTPATIENT
Start: 2022-08-16 | End: 2022-08-16

## 2022-08-16 RX ORDER — CLOBETASOL PROPIONATE 0.5 MG/G
OINTMENT TOPICAL ONCE
Status: CANCELLED | OUTPATIENT
Start: 2022-08-16 | End: 2022-08-16

## 2022-08-16 RX ORDER — LIDOCAINE 50 MG/G
OINTMENT TOPICAL ONCE
Status: CANCELLED | OUTPATIENT
Start: 2022-08-16 | End: 2022-08-16

## 2022-08-16 RX ORDER — LIDOCAINE HYDROCHLORIDE 40 MG/ML
SOLUTION TOPICAL ONCE
Status: CANCELLED | OUTPATIENT
Start: 2022-08-16 | End: 2022-08-16

## 2022-08-16 RX ORDER — GENTAMICIN SULFATE 1 MG/G
OINTMENT TOPICAL ONCE
Status: CANCELLED | OUTPATIENT
Start: 2022-08-16 | End: 2022-08-16

## 2022-08-16 RX ORDER — BACITRACIN, NEOMYCIN, POLYMYXIN B 400; 3.5; 5 [USP'U]/G; MG/G; [USP'U]/G
OINTMENT TOPICAL ONCE
Status: CANCELLED | OUTPATIENT
Start: 2022-08-16 | End: 2022-08-16

## 2022-08-16 ASSESSMENT — PAIN SCALES - GENERAL: PAINLEVEL_OUTOF10: 0

## 2022-08-16 NOTE — DISCHARGE INSTRUCTIONS
CNP in 1 week in the wound care center. Call [phone] for any questions or concerns.   Date__________   Time___________

## 2022-08-16 NOTE — PROGRESS NOTES
Wound Care Center Progress Note With Procedure    Jose Borjas  AGE: 50 y.o. GENDER: male  : 1970  EPISODE DATE:  2022     Subjective:     Chief Complaint   Patient presents with    Wound Check     Left leg          HISTORY of PRESENT ILLNESS     Jose Borjas is a 54 y.o. male who presents to the [address] [place] [address] for evaluation and treatment of Chronic non-healing surgical  ulcer(s) of the left lateral ankle. The condition is of moderate severity. The ulcer has been present since 22 post left ankle hardware removal (related to pain) per Dr. Estella Jasso. He initially had left ankle open reduction internal fixation left ankle per Dr. Estella Jasso 21. The underlying cause was initially nonhealing surgical but related to the length of time the wound has been present and venous components to his wound it is now venous in nature with edema. The patients care to date has included evaluation and follow per Dr. Marilee Islas. The patient has significant underlying medical conditions as below. Smaller this week. Clean and dry. Patient will have scarring and deformity around healed wound, but open areas within are small.   Patient went a full week with wrap and did not have any issues     Wound Pain Timing/Severity: intermittent, mild  Quality of pain: dull, tender  Severity of pain:  2 / 10  Modifying Factors: edema, venous stasis, obesity and smoking  Associated Signs/Symptoms: edema, erythema, drainage and pain        PAST MEDICAL HISTORY        Diagnosis Date    Fracture     in ER 2021 fx left ankle- for surg 2021    History of alcohol abuse     Pericarditis     \"in \" no longer forllow with cardiologist    Sleep apnea     had sleep study - uses cpap       PAST SURGICAL HISTORY    Past Surgical History:   Procedure Laterality Date    ANKLE FRACTURE SURGERY Left 2021    LEFT ANKLE OPEN REDUCTION INTERNAL FIXATION performed by Tess Rodriguez MD at [address] [place] [address] Left 2022 LEFT ANKLE HARDWARE REMOVAL performed by Natty Elias MD at [facility]    Family History   Problem Relation Age of Onset    Breast Cancer Mother     Cancer Father         lung cancer       SOCIAL HISTORY    Social History     Tobacco Use    Smoking status: Every Day     Packs/day: 1.50     Years: 25.00     Pack years: 37.50     Types: Cigarettes    Smokeless tobacco: Never    Tobacco comments:     instructed adverse effects   Vaping Use    Vaping Use: Never used   Substance Use Topics    Alcohol use: Yes     Comment: average\" 5-6 beers per day\"    Drug use: Not Currently     Types: Marijuana Candiss Littler)     Comment: \"20+ yrs ago \"       ALLERGIES    No Known Allergies    MEDICATIONS    Current Outpatient Medications on File Prior to Encounter   Medication Sig Dispense Refill    Naproxen Sodium 220 MG CAPS Take 1 tablet by mouth daily as needed for Pain       No current facility-administered medications on file prior to encounter. REVIEW OF SYSTEMS    Pertinent items are noted in HPI. Constitutional: Negative for systemic symptoms including fever, chills and malaise. Objective:      BP (!) 169/82   Pulse 68   Temp 97.8 °F (36.6 °C) (Temporal)   Resp 18     PHYSICAL EXAM      General: The patient is in no acute distress. Mental status:  Patient is appropriate, is  oriented to place and plan of care.   Dermatologic exam: Visual inspection of the periwound reveals the skin to be normal in turgor and texture and dry  Wound exam: see wound description below in procedure note      Assessment:     Problem List Items Addressed This Visit          Other    WD-Nonhealing nonsurgical wound with fat layer exposed - Primary    Relevant Orders    Initiate Outpatient Wound Care Protocol    WD-Skin ulcer of ankle with fat layer exposed ([address]    Relevant Orders    Initiate Outpatient Wound Care Protocol    WD-Venous stasis ulcer of ankle with fat layer exposed ([address]     Procedure Note    Indications: Based on my examination of this patient's wound(s) today, sharp excision into necrotic subcutaneous tissue is required to promote healing and evaluate the extent of previous healing. Performed by: KISHA Singleton CNP    Consent obtained: Yes    Time out taken:  Yes    Pain Control: N/A      Debridement:Excisional Debridement    Using curette the wound(s) was/were sharply debrided down through and including the removal of subcutaneous tissue.         Devitalized Tissue Debrided:  fibrin, biofilm, slough, and exudate    Pre Debridement Measurements:  Are located in the Wound Documentation Flow Sheet    All active wounds listed below with today's date are evaluated  Wound(s)    debrided this date include # : 1     Post  Debridement Measurements:  Wound 05/10/22 Pretibial Left;Lateral (Active)   Wound Image   08/09/22 0930   Wound Etiology Non-Healing Surgical 08/16/22 0957   Dressing Status New dressing applied 08/05/22 0936   Wound Cleansed Soap and water 08/16/22 0957   Offloading for Diabetic Foot Ulcers Offloading boot 08/16/22 0957   Wound Length (cm) 1.4 cm 08/16/22 0957   Wound Width (cm) 0.3 cm 08/16/22 0957   Wound Depth (cm) 0.1 cm 08/16/22 0957   Wound Surface Area (cm^2) 0.42 cm^2 08/16/22 0957   Change in Wound Size % (l*w) 95.69 08/16/22 0957   Wound Volume (cm^3) 0.042 cm^3 08/16/22 0957   Wound Healing % 98 08/16/22 0957   Post-Procedure Length (cm) 1.4 cm 08/16/22 1014   Post-Procedure Width (cm) 0.3 cm 08/16/22 1014   Post-Procedure Depth (cm) 1 cm 08/16/22 1014   Post-Procedure Surface Area (cm^2) 0.42 cm^2 08/16/22 1014   Post-Procedure Volume (cm^3) 0.42 cm^3 08/16/22 1014   Distance Tunneling (cm) 0 cm 08/16/22 0957   Tunneling Position ___ O'Clock 0 08/16/22 0957   Undermining Starts ___ O'Clock 0 08/16/22 0957   Undermining Ends___ O'Clock 0 08/16/22 0957   Undermining Maxium Distance (cm) 0 08/16/22 0957   Wound Assessment Pink/red 08/16/22 0957   Drainage Amount Moderate 08/16/22 0957   Drainage Description Serosanguinous 08/16/22 0957   Odor None 08/16/22 0957   Reyna-wound Assessment Intact; Maceration 08/16/22 0957   Margins Defined edges 08/16/22 0957   Wound Thickness Description not for Pressure Injury Full thickness 08/16/22 0957   Number of days: 98       Percent of Wound(s) Debrided: approximately 100%    Total  Area  Debrided:  0.42 sq cm     Bleeding:  Minimal    Hemostasis Achieved:  by pressure    Procedural Pain:  3  / 10     Post Procedural Pain:  0 / 10     Response to treatment:  Well tolerated by patient. Status of wound progress and description from last visit:   Improving each week and hope to heal out soon  Continue to monitor and continue plan of care for now  Follow up 1 week . Plan:       Discharge Instructions         PHYSICIAN ORDERS AND DISCHARGE INSTRUCTIONS     NOTE: Upon discharge from the [address], you will receive a patient experience survey. We would be grateful if you would take the time to fill this survey out. Wound care order history:                 KERI's   Right  0.56     Left 0.5              Date: 5/10/22              Cultures:                 Grafts:  Applied for grafts 7/12/22              Antibiotics:           Continuing wound care orders and information:                 Residence:                Continue home health care with:              Your wound-care supplies will be provided by: Wound cleansing:              Do not scrub or use excessive force. Wash hands with soap and water before and after dressing changes. Prior to applying a clean dressing, cleanse wound with normal saline, wound cleanser, or mild soap and water. Ask the physician or nurse before getting the wound(s) wet in a shower     Daily Wound management:              Keep weight off wounds and reposition every 2 hours. Avoid standing for long periods of time.               Apply wraps/stockings in AM and remove at bedtime. If swelling is present, elevate legs to the level of the heart or above for 30 minutes 4-5 times a day and/or when sitting. When taking antibiotics take entire prescription as ordered by physician do not stop taking until medicine is all gone. Orders for this week: 8/16/22        Culture of Left Lateral Ankle taken on 5/10/22. Rx: [facility] in [place]        Left Lateral Ankle - Wash with soap and water, pat dry. Apply Small amount of Anasept and Stimulen to wound bed. Cover with super absorber (Sorbex). Wrap with Coban 2. Leave in place until next week. WOUND VAC Discontinued 7/26/22. May return to [facility]. Follow up with Linda Aguirre CNP in 1 week in the wound care center. Call [phone] for any questions or concerns.   Date__________   Time___________        Treatment Note      Written Patient Dismissal Instructions Given            Electronically signed by KISHA Edwards CNP on 8/16/2022 at 10:15 AM

## 2022-08-16 NOTE — PROGRESS NOTES
Multilayer Compression Wrap   (Not Unna) Below the Knee    NAME:  Santiago Smith  YOB: 1970  MEDICAL RECORD NUMBER:  [id number]  DATE:  8/16/2022    Multilayer compression wrap: Removed old Multilayer wrap if indicated and wash leg with mild soap/water. Applied moisturizing agent to dry skin as needed. Applied primary and secondary dressing as ordered. Applied multilayered dressing below the knee to left lower leg. Instructed patient/caregiver not to remove dressing and to keep it clean and dry. Instructed patient/caregiver on complications to report to provider, such as pain, numbness in toes, heavy drainage, and slippage of dressing. Instructed patient on purpose of compression dressing and on activity and exercise recommendations.       Electronically signed by Garland Ron RN on 8/16/2022 at 10:26 AM

## 2022-08-16 NOTE — PLAN OF CARE
Problem: Pain  Goal: Verbalizes/displays adequate comfort level or baseline comfort level  Outcome: Progressing     Problem: Wound:  Goal: Will show signs of wound healing; wound closure and no evidence of infection  Outcome: Progressing

## 2022-08-23 ENCOUNTER — HOSPITAL ENCOUNTER (OUTPATIENT)
Dept: WOUND CARE | Age: 52
Discharge: HOME | End: 2022-08-23
Payer: COMMERCIAL

## 2022-08-23 VITALS
DIASTOLIC BLOOD PRESSURE: 80 MMHG | TEMPERATURE: 97.5 F | SYSTOLIC BLOOD PRESSURE: 163 MMHG | HEART RATE: 85 BPM | RESPIRATION RATE: 16 BRPM

## 2022-08-23 DIAGNOSIS — L97.322 SKIN ULCER OF LEFT ANKLE WITH FAT LAYER EXPOSED (HCC): ICD-10-CM

## 2022-08-23 DIAGNOSIS — T14.8XXA NONHEALING NONSURGICAL WOUND WITH FAT LAYER EXPOSED: Primary | ICD-10-CM

## 2022-08-23 PROCEDURE — 11042 DBRDMT SUBQ TIS 1ST 20SQCM/<: CPT

## 2022-08-23 PROCEDURE — 11042 DBRDMT SUBQ TIS 1ST 20SQCM/<: CPT | Performed by: NURSE PRACTITIONER

## 2022-08-23 RX ORDER — BACITRACIN ZINC AND POLYMYXIN B SULFATE 500; 1000 [USP'U]/G; [USP'U]/G
OINTMENT TOPICAL ONCE
Status: CANCELLED | OUTPATIENT
Start: 2022-08-23 | End: 2022-08-23

## 2022-08-23 RX ORDER — LIDOCAINE 50 MG/G
OINTMENT TOPICAL ONCE
Status: CANCELLED | OUTPATIENT
Start: 2022-08-23 | End: 2022-08-23

## 2022-08-23 RX ORDER — BACITRACIN, NEOMYCIN, POLYMYXIN B 400; 3.5; 5 [USP'U]/G; MG/G; [USP'U]/G
OINTMENT TOPICAL ONCE
Status: CANCELLED | OUTPATIENT
Start: 2022-08-23 | End: 2022-08-23

## 2022-08-23 RX ORDER — GENTAMICIN SULFATE 1 MG/G
OINTMENT TOPICAL ONCE
Status: CANCELLED | OUTPATIENT
Start: 2022-08-23 | End: 2022-08-23

## 2022-08-23 RX ORDER — LIDOCAINE 40 MG/G
CREAM TOPICAL ONCE
Status: CANCELLED | OUTPATIENT
Start: 2022-08-23 | End: 2022-08-23

## 2022-08-23 RX ORDER — GINSENG 100 MG
CAPSULE ORAL ONCE
Status: CANCELLED | OUTPATIENT
Start: 2022-08-23 | End: 2022-08-23

## 2022-08-23 RX ORDER — CLOBETASOL PROPIONATE 0.5 MG/G
OINTMENT TOPICAL ONCE
Status: CANCELLED | OUTPATIENT
Start: 2022-08-23 | End: 2022-08-23

## 2022-08-23 RX ORDER — BETAMETHASONE DIPROPIONATE 0.05 %
OINTMENT (GRAM) TOPICAL ONCE
Status: CANCELLED | OUTPATIENT
Start: 2022-08-23 | End: 2022-08-23

## 2022-08-23 RX ORDER — LIDOCAINE HYDROCHLORIDE 40 MG/ML
SOLUTION TOPICAL ONCE
Status: CANCELLED | OUTPATIENT
Start: 2022-08-23 | End: 2022-08-23

## 2022-08-23 ASSESSMENT — PAIN DESCRIPTION - ORIENTATION: ORIENTATION: LEFT

## 2022-08-23 ASSESSMENT — PAIN DESCRIPTION - PAIN TYPE: TYPE: ACUTE PAIN

## 2022-08-23 ASSESSMENT — PAIN DESCRIPTION - DESCRIPTORS: DESCRIPTORS: TINGLING

## 2022-08-23 ASSESSMENT — PAIN - FUNCTIONAL ASSESSMENT: PAIN_FUNCTIONAL_ASSESSMENT: ACTIVITIES ARE NOT PREVENTED

## 2022-08-23 ASSESSMENT — PAIN SCALES - GENERAL: PAINLEVEL_OUTOF10: 1

## 2022-08-23 ASSESSMENT — PAIN DESCRIPTION - FREQUENCY: FREQUENCY: INTERMITTENT

## 2022-08-23 ASSESSMENT — PAIN DESCRIPTION - LOCATION: LOCATION: ANKLE

## 2022-08-23 ASSESSMENT — PAIN DESCRIPTION - ONSET: ONSET: ON-GOING

## 2022-08-23 NOTE — PROGRESS NOTES
Wound Care Center Progress Note With Procedure    Grabiel Dumont  AGE: 50 y.o. GENDER: male  : 1970  EPISODE DATE:  2022     Subjective:     Chief Complaint   Patient presents with    Wound Check     Left Ankle         HISTORY of PRESENT ILLNESS   Grabiel Dumotn is a 54 y.o. male who presents to the [address] [place] [address] for evaluation and treatment of Chronic non-healing surgical  ulcer(s) of the left lateral ankle. The condition is of moderate severity. The ulcer has been present since 22 post left ankle hardware removal (related to pain) per Dr. Ash Chowdhury. He initially had left ankle open reduction internal fixation left ankle per Dr. Ash Chowdhury 21. The underlying cause was initially nonhealing surgical but related to the length of time the wound has been present and venous components to his wound it is now venous in nature with edema. The patients care to date has included evaluation and follow per Dr. Cornel Huerta. The patient has significant underlying medical conditions as below. Wound Pain Timing/Severity: intermittent, mild  Quality of pain: dull, tender  Severity of pain:  2 / 10   Modifying Factors: edema, venous stasis, obesity and smoking  Associated Signs/Symptoms: edema, erythema, drainage and pain     KERI: Right 0.56, left 0.5 as of 5/10/22     Arterial evaluation: based on wound; locations, assessment and healing progress        Venous Evaluation: based on wound; locations, assessment and healing progress     Wound infection: wound culture obtained 5/10/22     Diabetes: No  Smoking: Yes (approximately 1 pack per day). Patient counseled in length on smoking cessation including benefits of quitting and health risks of continuing to smoke including but not limited to lung cancer, COPD, risk of coronary artery disease. Patient verbalized understanding today, is not ready to quit.    Anticoagulant/Antiplatelet therapy: Low dose asprin  Immunosuppression: No  Obesity: Yes  Nutritional status:  well nourished. Discussed need for increased protein and calories for wound healing and good sources of protein (just over 7 grams for every 20 pounds of body weight). Animal-based foods high in protein (meat, poultry, fish, eggs, and dairy foods). Plant based foods high in protein (tofu, lentils, beans, chickpeas, nuts, quinoa and abeba seeds. Other History:     Patient educated on the 6 essential components necessary for wound healing: Circulation, Debridements, Proper Dressings and Topical Wound Products, Infection Control, Edema Control and Offloading. Patient educated on those factors that negatively effect or impact wound healing: smoking, obesity, uncontrolled diabetes, anticoagulant and immunosuppressive regimens, inadequate nutrition, untreated arterial and venous disease if applicable and measures to manage edema.           PAST MEDICAL HISTORY        Diagnosis Date    Fracture     in ER 7/24/2021 fx left ankle- for surg 7/30/2021    History of alcohol abuse     Pericarditis     \"in 2015\" no longer forllow with cardiologist    Sleep apnea     had sleep study 2014- uses cpap       PAST SURGICAL HISTORY    Past Surgical History:   Procedure Laterality Date    ANKLE FRACTURE SURGERY Left 7/30/2021    LEFT ANKLE OPEN REDUCTION INTERNAL FIXATION performed by Emily Villa MD at [address] [place] [address] Left 1/19/2022    LEFT ANKLE HARDWARE REMOVAL performed by Emily Villa MD at [facility]    Family History   Problem Relation Age of Onset    Breast Cancer Mother     Cancer Father         lung cancer       SOCIAL HISTORY    Social History     Tobacco Use    Smoking status: Every Day     Packs/day: 1.50     Years: 25.00     Pack years: 37.50     Types: Cigarettes    Smokeless tobacco: Never    Tobacco comments:     instructed adverse effects   Vaping Use    Vaping Use: Never used   Substance Use Topics    Alcohol use: Yes     Comment: average\" 5-6 beers per day\"    Drug use: Not Currently Types: Marijuana (Weed)     Comment: \"20+ yrs ago \"       ALLERGIES    No Known Allergies    MEDICATIONS    Current Outpatient Medications on File Prior to Encounter   Medication Sig Dispense Refill    Naproxen Sodium 220 MG CAPS Take 1 tablet by mouth daily as needed for Pain       No current facility-administered medications on file prior to encounter. REVIEW OF SYSTEMS    Pertinent items are noted in HPI. Constitutional: Negative for systemic symptoms including fever, chills and malaise. Objective:      BP (!) 163/80   Pulse 85   Temp 97.5 °F (36.4 °C) (Temporal)   Resp 16     PHYSICAL EXAM    General: The patient is in no acute distress. Mental status:  Patient is appropriate, is  oriented to place and plan of care. Dermatologic exam: Visual inspection of the periwound reveals the skin to be edematous  Wound exam: see wound description below in procedure note      Assessment:     Problem List Items Addressed This Visit          Other    WD-Nonhealing nonsurgical wound with fat layer exposed - Primary    Relevant Orders    Initiate Outpatient Wound Care Protocol    WD-Skin ulcer of ankle with fat layer exposed ([address]    Relevant Orders    Initiate Outpatient Wound Care Protocol     Procedure Note    Indications:  Based on my examination of this patient's wound(s) today, sharp excision into necrotic subcutaneous tissue is required to promote healing and evaluate the extent of previous healing. Performed by: KISHA Encarnacion - CNP    Consent obtained: Yes    Time out taken:  Yes    Pain Control:       Debridement:Excisional Debridement    Using curette the wound(s) was/were sharply debrided down through and including the removal of subcutaneous tissue.         Devitalized Tissue Debrided:  slough and exudate    Pre Debridement Measurements:  Are located in the Wound Documentation Flow Sheet    All active wounds listed below with today's date are evaluated  Wound(s)    debrided this date include # : 1     Post  Debridement Measurements:  Wound 05/10/22 Pretibial Left;Lateral (Active)   Wound Image   08/23/22 0951   Wound Etiology Non-Healing Surgical 08/23/22 0951   Dressing Status New dressing applied 08/23/22 1024   Wound Cleansed Soap and water 08/23/22 0951   Offloading for Diabetic Foot Ulcers Offloading boot 08/23/22 0951   Wound Length (cm) 3.7 cm 08/23/22 0951   Wound Width (cm) 1 cm 08/23/22 0951   Wound Depth (cm) 0.3 cm 08/23/22 0951   Wound Surface Area (cm^2) 3.7 cm^2 08/23/22 0951   Change in Wound Size % (l*w) 62.05 08/23/22 0951   Wound Volume (cm^3) 1.11 cm^3 08/23/22 0951   Wound Healing % 43 08/23/22 0951   Post-Procedure Length (cm) 3.7 cm 08/23/22 1008   Post-Procedure Width (cm) 0.7 cm 08/23/22 1008   Post-Procedure Depth (cm) 0.2 cm 08/23/22 1008   Post-Procedure Surface Area (cm^2) 2.59 cm^2 08/23/22 1008   Post-Procedure Volume (cm^3) 0.518 cm^3 08/23/22 1008   Distance Tunneling (cm) 0 cm 08/23/22 0951   Tunneling Position ___ O'Clock 0 08/23/22 0951   Undermining Starts ___ O'Clock 0 08/23/22 0951   Undermining Ends___ O'Clock 0 08/23/22 0951   Undermining Maxium Distance (cm) 0 08/23/22 0951   Wound Assessment Pe Ell/red;Slough 08/23/22 0951   Drainage Amount Small 08/23/22 0951   Drainage Description Yellow;Brown 08/23/22 0951   Odor None 08/23/22 0951   Reyna-wound Assessment Maceration;Blanchable erythema 08/23/22 0951   Margins Defined edges 08/23/22 0951   Wound Thickness Description not for Pressure Injury Full thickness 08/23/22 0951   Number of days: 105     Percent of Wound(s) Debrided: approximately 100%    Total  Area  Debrided: 3.7 sq cm     Bleeding:  Minimal    Hemostasis Achieved:  by pressure    Procedural Pain:  0  / 10     Post Procedural Pain:  0 / 10     Response to treatment:  Well tolerated by patient. Status of wound progress and description from last visit: Stable, continue regiment with compression wrap. NPWT has been discontinued.   Plan: Discharge Instructions         PHYSICIAN ORDERS AND DISCHARGE INSTRUCTIONS     NOTE: Upon discharge from the [address], you will receive a patient experience survey. We would be grateful if you would take the time to fill this survey out. Wound care order history:                 KERI's   Right  0.56     Left 0.5              Date: 5/10/22              Cultures:                 Grafts:  Applied for grafts 7/12/22              Antibiotics:           Continuing wound care orders and information:                 Residence:                Continue home health care with:              Your wound-care supplies will be provided by: Wound cleansing:              Do not scrub or use excessive force. Wash hands with soap and water before and after dressing changes. Prior to applying a clean dressing, cleanse wound with normal saline, wound cleanser, or mild soap and water. Ask the physician or nurse before getting the wound(s) wet in a shower     Daily Wound management:              Keep weight off wounds and reposition every 2 hours. Avoid standing for long periods of time. Apply wraps/stockings in AM and remove at bedtime. If swelling is present, elevate legs to the level of the heart or above for 30 minutes 4-5 times a day and/or when sitting. When taking antibiotics take entire prescription as ordered by physician do not stop taking until medicine is all gone. Orders for this week: 8/23/22        Culture of Left Lateral Ankle taken on 5/10/22. Rx: [facility] in [place]        Left Lateral Ankle - Wash with soap and water, pat dry. Apply Small amount of Anasept and Stimulen to wound bed. Cover with super absorber (Sorbex). Wrap with Coban 2. Leave in place until next week. WOUND VAC Discontinued 7/26/22. May return to [facility]. Follow up with Cr Cordero CNP in 1 week in the wound care center. Call [phone] for any questions or concerns.   Date__________   Time__________        Treatment Note Wound 05/10/22 Pretibial Left;Lateral-Dressing/Treatment:  (anasept stimulen sorbrex Harle Priduke)    Written Patient Dismissal Instructions Given            Electronically signed by KISHA Knight CNP on 8/23/2022 at 10:56 AM

## 2022-08-23 NOTE — DISCHARGE INSTRUCTIONS
PHYSICIAN ORDERS AND DISCHARGE INSTRUCTIONS     NOTE: Upon discharge from the [address], you will receive a patient experience survey. We would be grateful if you would take the time to fill this survey out. Wound care order history:                 KERI's   Right  0.56     Left 0.5              Date: 5/10/22              Cultures:                 Grafts:  Applied for grafts 7/12/22              Antibiotics:           Continuing wound care orders and information:                 Residence:                Continue home health care with:              Your wound-care supplies will be provided by: Wound cleansing:              Do not scrub or use excessive force. Wash hands with soap and water before and after dressing changes. Prior to applying a clean dressing, cleanse wound with normal saline, wound cleanser, or mild soap and water. Ask the physician or nurse before getting the wound(s) wet in a shower     Daily Wound management:              Keep weight off wounds and reposition every 2 hours. Avoid standing for long periods of time. Apply wraps/stockings in AM and remove at bedtime. If swelling is present, elevate legs to the level of the heart or above for 30 minutes 4-5 times a day and/or when sitting. When taking antibiotics take entire prescription as ordered by physician do not stop taking until medicine is all gone. Orders for this week: 8/23/22        Culture of Left Lateral Ankle taken on 5/10/22. Rx: [facility] in [place]        Left Lateral Ankle - Wash with soap and water, pat dry. Apply Small amount of Anasept and Stimulen to wound bed. Cover with super absorber (Sorbex). Wrap with Coban 2. Leave in place until next week. WOUND VAC Discontinued 7/26/22. May return to [facility].            Follow up with Brandi Roman CNP in 1 week in the wound care center. Call [phone] for any questions or concerns.   Date__________   Time__________

## 2022-08-23 NOTE — PROGRESS NOTES
Multilayer Compression Wrap   (Not Unna) Below the Knee    NAME:  Gagan Ravi  YOB: 1970  MEDICAL RECORD NUMBER:  [id number]  DATE:  8/23/2022    Multilayer compression wrap: Removed old Multilayer wrap if indicated and wash leg with mild soap/water. Applied moisturizing agent to dry skin as needed. Applied primary and secondary dressing as ordered. Applied multilayered dressing below the knee to left lower leg. Instructed patient/caregiver not to remove dressing and to keep it clean and dry. Instructed patient/caregiver on complications to report to provider, such as pain, numbness in toes, heavy drainage, and slippage of dressing. Instructed patient on purpose of compression dressing and on activity and exercise recommendations.       Electronically signed by Amadeo Cerrato RN on 8/23/2022 at 10:25 AM

## 2022-08-30 ENCOUNTER — HOSPITAL ENCOUNTER (OUTPATIENT)
Dept: WOUND CARE | Age: 52
Discharge: HOME | End: 2022-08-30
Payer: COMMERCIAL

## 2022-08-30 VITALS
RESPIRATION RATE: 18 BRPM | HEART RATE: 106 BPM | TEMPERATURE: 98 F | DIASTOLIC BLOOD PRESSURE: 83 MMHG | SYSTOLIC BLOOD PRESSURE: 135 MMHG

## 2022-08-30 DIAGNOSIS — L97.322 SKIN ULCER OF LEFT ANKLE WITH FAT LAYER EXPOSED (HCC): ICD-10-CM

## 2022-08-30 DIAGNOSIS — T14.8XXA NONHEALING NONSURGICAL WOUND WITH FAT LAYER EXPOSED: Primary | ICD-10-CM

## 2022-08-30 DIAGNOSIS — I83.023 VENOUS STASIS ULCER OF LEFT ANKLE WITH FAT LAYER EXPOSED WITH VARICOSE VEINS (HCC): ICD-10-CM

## 2022-08-30 DIAGNOSIS — L97.322 VENOUS STASIS ULCER OF LEFT ANKLE WITH FAT LAYER EXPOSED WITH VARICOSE VEINS (HCC): ICD-10-CM

## 2022-08-30 PROCEDURE — 11042 DBRDMT SUBQ TIS 1ST 20SQCM/<: CPT | Performed by: NURSE PRACTITIONER

## 2022-08-30 PROCEDURE — 11042 DBRDMT SUBQ TIS 1ST 20SQCM/<: CPT

## 2022-08-30 RX ORDER — LIDOCAINE 40 MG/G
CREAM TOPICAL ONCE
Status: CANCELLED | OUTPATIENT
Start: 2022-08-30 | End: 2022-08-30

## 2022-08-30 RX ORDER — LIDOCAINE HYDROCHLORIDE 40 MG/ML
SOLUTION TOPICAL ONCE
Status: CANCELLED | OUTPATIENT
Start: 2022-08-30 | End: 2022-08-30

## 2022-08-30 RX ORDER — BACITRACIN ZINC AND POLYMYXIN B SULFATE 500; 1000 [USP'U]/G; [USP'U]/G
OINTMENT TOPICAL ONCE
Status: CANCELLED | OUTPATIENT
Start: 2022-08-30 | End: 2022-08-30

## 2022-08-30 RX ORDER — CLOBETASOL PROPIONATE 0.5 MG/G
OINTMENT TOPICAL ONCE
Status: CANCELLED | OUTPATIENT
Start: 2022-08-30 | End: 2022-08-30

## 2022-08-30 RX ORDER — BETAMETHASONE DIPROPIONATE 0.05 %
OINTMENT (GRAM) TOPICAL ONCE
Status: CANCELLED | OUTPATIENT
Start: 2022-08-30 | End: 2022-08-30

## 2022-08-30 RX ORDER — GINSENG 100 MG
CAPSULE ORAL ONCE
Status: CANCELLED | OUTPATIENT
Start: 2022-08-30 | End: 2022-08-30

## 2022-08-30 RX ORDER — LIDOCAINE 50 MG/G
OINTMENT TOPICAL ONCE
Status: CANCELLED | OUTPATIENT
Start: 2022-08-30 | End: 2022-08-30

## 2022-08-30 RX ORDER — BACITRACIN, NEOMYCIN, POLYMYXIN B 400; 3.5; 5 [USP'U]/G; MG/G; [USP'U]/G
OINTMENT TOPICAL ONCE
Status: CANCELLED | OUTPATIENT
Start: 2022-08-30 | End: 2022-08-30

## 2022-08-30 RX ORDER — GENTAMICIN SULFATE 1 MG/G
OINTMENT TOPICAL ONCE
Status: CANCELLED | OUTPATIENT
Start: 2022-08-30 | End: 2022-08-30

## 2022-08-30 ASSESSMENT — PAIN SCALES - GENERAL: PAINLEVEL_OUTOF10: 0

## 2022-08-30 NOTE — PROGRESS NOTES
Wound Care Center Progress Note With Procedure    Cheo Fay  AGE: 50 y.o. GENDER: male  : 1970  EPISODE DATE:  2022     Subjective:     Chief Complaint   Patient presents with    Wound Check     Right leg         HISTORY of PRESENT ILLNESS     Cheo Fay is a 54 y.o. male who presents to the [address] [place] [address] for evaluation and treatment of Chronic non-healing surgical  ulcer(s) of the left lateral ankle. The condition is of moderate severity. The ulcer has been present since 22 post left ankle hardware removal (related to pain) per Dr. Rita Rivera. He initially had left ankle open reduction internal fixation left ankle per Dr. Rita Rivera 21. The underlying cause was initially nonhealing surgical but related to the length of time the wound has been present and venous components to his wound it is now venous in nature with edema. The patients care to date has included evaluation and follow per Dr. Nimo Merino. The patient has significant underlying medical conditions as below. Much improved. There is only 1 small lesion remaining, and majority of original wound has healed. Patient produces a lot of exudate that always needs carefully debrided. Patient still reports pain with palpation.   Close to healing overall and patient is still ok with getting wrap for the week     Wound Pain Timing/Severity: intermittent, mild  Quality of pain: dull, tender  Severity of pain:  2 / 10   Modifying Factors: edema, venous stasis, obesity and smoking  Associated Signs/Symptoms: edema, erythema, drainage and pain     KERI: Right 0.56, left 0.5 as of 5/10/22        PAST MEDICAL HISTORY        Diagnosis Date    Fracture     in ER 2021 fx left ankle- for surg 2021    History of alcohol abuse     Pericarditis     \"in 2015\" no longer forllow with cardiologist    Sleep apnea     had sleep study - uses cpap       PAST SURGICAL HISTORY    Past Surgical History:   Procedure Laterality Date    ANKLE FRACTURE SURGERY Left 7/30/2021    LEFT ANKLE OPEN REDUCTION INTERNAL FIXATION performed by Lalito Mercado MD at [address] Left 1/19/2022    LEFT ANKLE HARDWARE REMOVAL performed by Lalito Mercado MD at [address]    Family History   Problem Relation Age of Onset    Breast Cancer Mother     Cancer Father         lung cancer       SOCIAL HISTORY    Social History     Tobacco Use    Smoking status: Every Day     Packs/day: 1.50     Years: 25.00     Pack years: 37.50     Types: Cigarettes    Smokeless tobacco: Never    Tobacco comments:     instructed adverse effects   Vaping Use    Vaping Use: Never used   Substance Use Topics    Alcohol use: Yes     Comment: average\" 5-6 beers per day\"    Drug use: Not Currently     Types: Marijuana Tracy Clements)     Comment: \"20+ yrs ago \"       ALLERGIES    No Known Allergies    MEDICATIONS    Current Outpatient Medications on File Prior to Encounter   Medication Sig Dispense Refill    Naproxen Sodium 220 MG CAPS Take 1 tablet by mouth daily as needed for Pain       No current facility-administered medications on file prior to encounter. REVIEW OF SYSTEMS    Pertinent items are noted in HPI. Constitutional: Negative for systemic symptoms including fever, chills and malaise. Objective:      /83   Pulse (!) 106   Temp 98 °F (36.7 °C) (Temporal)   Resp 18     PHYSICAL EXAM      General: The patient is in no acute distress. Mental status:  Patient is appropriate, is  oriented to place and plan of care.   Dermatologic exam: Visual inspection of the periwound reveals the skin to be normal in turgor and texture and dry  Wound exam: see wound description below in procedure note      Assessment:     Problem List Items Addressed This Visit          Other    WD-Nonhealing nonsurgical wound with fat layer exposed - Primary    Relevant Orders    Initiate Outpatient Wound Care Protocol    WD-Skin ulcer of ankle with fat layer exposed ([address]    Relevant 08/30/22 0946   Undermining Maxium Distance (cm) 0 08/30/22 0946   Wound Assessment Devitalized tissue;Pink/red;Slough 08/30/22 0946   Drainage Amount Small 08/30/22 0946   Drainage Description Yellow;Brown 08/30/22 0946   Odor None 08/30/22 0946   Reyna-wound Assessment Intact;Fragile 08/30/22 0946   Margins Defined edges 08/30/22 0946   Wound Thickness Description not for Pressure Injury Full thickness 08/30/22 0946   Number of days: 112       Percent of Wound(s) Debrided: approximately 100%    Total  Area  Debrided:  0.4 sq cm     Bleeding:  Minimal    Hemostasis Achieved:  by pressure    Procedural Pain:  3  / 10     Post Procedural Pain:  0 / 10     Response to treatment:  Well tolerated by patient. Status of wound progress and description from last visit:   Making good, steady progress this week. Continue to monitor and continue plan of care for now   Follow up 1 week       Plan:       Discharge Instructions         [address]     NOTE: Upon discharge from the [address], you will receive a patient experience survey. We would be grateful if you would take the time to fill this survey out. Wound care order history:                 KERI's   Right  0.56     Left 0.5              Date: 5/10/22              Cultures:                 Grafts:  Applied for grafts 7/12/22              Antibiotics:           Continuing wound care orders and information:                 Residence:                Continue home health care with:              Your wound-care supplies will be provided by: Wound cleansing:              Do not scrub or use excessive force. Wash hands with soap and water before and after dressing changes. Prior to applying a clean dressing, cleanse wound with normal saline, wound cleanser, or mild soap and water.               Ask the physician or nurse before getting the wound(s) wet in a shower     Daily Wound management:              Keep

## 2022-08-30 NOTE — PROGRESS NOTES
Multilayer Compression Wrap   (Not Unna) Below the Knee    NAME:  Maddie Cho  YOB: 1970  MEDICAL RECORD NUMBER:  [id number]  DATE:  8/30/2022    Multilayer compression wrap: Removed old Multilayer wrap if indicated and wash leg with mild soap/water. Applied moisturizing agent to dry skin as needed. Applied primary and secondary dressing as ordered. Applied multilayered dressing below the knee to left lower leg. Instructed patient/caregiver not to remove dressing and to keep it clean and dry. Instructed patient/caregiver on complications to report to provider, such as pain, numbness in toes, heavy drainage, and slippage of dressing. Instructed patient on purpose of compression dressing and on activity and exercise recommendations.       Electronically signed by Jhon Noel LPN on 8/20/9228 at 45:73 AM

## 2022-08-30 NOTE — DISCHARGE INSTRUCTIONS
PHYSICIAN ORDERS AND DISCHARGE INSTRUCTIONS     NOTE: Upon discharge from the [address], you will receive a patient experience survey. We would be grateful if you would take the time to fill this survey out. Wound care order history:                 KERI's   Right  0.56     Left 0.5              Date: 5/10/22              Cultures:                 Grafts:  Applied for grafts 7/12/22              Antibiotics:           Continuing wound care orders and information:                 Residence:                Continue home health care with:              Your wound-care supplies will be provided by: Wound cleansing:              Do not scrub or use excessive force. Wash hands with soap and water before and after dressing changes. Prior to applying a clean dressing, cleanse wound with normal saline, wound cleanser, or mild soap and water. Ask the physician or nurse before getting the wound(s) wet in a shower     Daily Wound management:              Keep weight off wounds and reposition every 2 hours. Avoid standing for long periods of time. Apply wraps/stockings in AM and remove at bedtime. If swelling is present, elevate legs to the level of the heart or above for 30 minutes 4-5 times a day and/or when sitting. When taking antibiotics take entire prescription as ordered by physician do not stop taking until medicine is all gone. Orders for this week: 8/30/22        Culture of Left Lateral Ankle taken on 5/10/22. Rx: [facility] in [place]        Left Lateral Ankle - Wash with soap and water, pat dry. Apply Small amount of Anasept and Stimulen to wound bed. Cover with super absorber (Sorbex). Wrap with Coban 2. Leave in place until next week. WOUND VAC Discontinued 7/26/22. May return to [facility].            Follow up with Ashley Heath

## 2022-09-06 ENCOUNTER — HOSPITAL ENCOUNTER (OUTPATIENT)
Dept: WOUND CARE | Age: 52
Discharge: HOME | End: 2022-09-06
Payer: COMMERCIAL

## 2022-09-06 VITALS
TEMPERATURE: 97.6 F | SYSTOLIC BLOOD PRESSURE: 148 MMHG | HEART RATE: 107 BPM | DIASTOLIC BLOOD PRESSURE: 88 MMHG | RESPIRATION RATE: 18 BRPM

## 2022-09-06 DIAGNOSIS — L97.322 SKIN ULCER OF LEFT ANKLE WITH FAT LAYER EXPOSED (HCC): ICD-10-CM

## 2022-09-06 DIAGNOSIS — T14.8XXA NONHEALING NONSURGICAL WOUND WITH FAT LAYER EXPOSED: Primary | ICD-10-CM

## 2022-09-06 PROCEDURE — 11042 DBRDMT SUBQ TIS 1ST 20SQCM/<: CPT

## 2022-09-06 PROCEDURE — 11042 DBRDMT SUBQ TIS 1ST 20SQCM/<: CPT | Performed by: NURSE PRACTITIONER

## 2022-09-06 RX ORDER — BACITRACIN ZINC AND POLYMYXIN B SULFATE 500; 1000 [USP'U]/G; [USP'U]/G
OINTMENT TOPICAL ONCE
Status: CANCELLED | OUTPATIENT
Start: 2022-09-06 | End: 2022-09-06

## 2022-09-06 RX ORDER — LIDOCAINE HYDROCHLORIDE 40 MG/ML
SOLUTION TOPICAL ONCE
Status: CANCELLED | OUTPATIENT
Start: 2022-09-06 | End: 2022-09-06

## 2022-09-06 RX ORDER — CLOBETASOL PROPIONATE 0.5 MG/G
OINTMENT TOPICAL ONCE
Status: CANCELLED | OUTPATIENT
Start: 2022-09-06 | End: 2022-09-06

## 2022-09-06 RX ORDER — BETAMETHASONE DIPROPIONATE 0.05 %
OINTMENT (GRAM) TOPICAL ONCE
Status: CANCELLED | OUTPATIENT
Start: 2022-09-06 | End: 2022-09-06

## 2022-09-06 RX ORDER — GINSENG 100 MG
CAPSULE ORAL ONCE
Status: CANCELLED | OUTPATIENT
Start: 2022-09-06 | End: 2022-09-06

## 2022-09-06 RX ORDER — GENTAMICIN SULFATE 1 MG/G
OINTMENT TOPICAL ONCE
Status: CANCELLED | OUTPATIENT
Start: 2022-09-06 | End: 2022-09-06

## 2022-09-06 RX ORDER — BACITRACIN, NEOMYCIN, POLYMYXIN B 400; 3.5; 5 [USP'U]/G; MG/G; [USP'U]/G
OINTMENT TOPICAL ONCE
Status: CANCELLED | OUTPATIENT
Start: 2022-09-06 | End: 2022-09-06

## 2022-09-06 RX ORDER — LIDOCAINE 50 MG/G
OINTMENT TOPICAL ONCE
Status: CANCELLED | OUTPATIENT
Start: 2022-09-06 | End: 2022-09-06

## 2022-09-06 RX ORDER — LIDOCAINE 40 MG/G
CREAM TOPICAL ONCE
Status: CANCELLED | OUTPATIENT
Start: 2022-09-06 | End: 2022-09-06

## 2022-09-06 ASSESSMENT — PAIN SCALES - GENERAL: PAINLEVEL_OUTOF10: 0

## 2022-09-06 NOTE — PROGRESS NOTES
Wound Care Center Progress Note With Procedure    Marian Tejada  AGE: 50 y.o. GENDER: male  : 1970  EPISODE DATE:  2022     Subjective:     Chief Complaint   Patient presents with    Wound Check     Right ankle         HISTORY of PRESENT ILLNESS     Marian Tejada is a 54 y.o. male who presents to the [address] [place] [address] for evaluation and treatment of Chronic non-healing surgical  ulcer(s) of the left lateral ankle. The condition is of moderate severity. The ulcer has been present since 22 post left ankle hardware removal (related to pain) per Dr. Fortunato Soliman. He initially had left ankle open reduction internal fixation left ankle per Dr. Fortunato Soliman 21. The underlying cause was initially nonhealing surgical but related to the length of time the wound has been present and venous components to his wound it is now venous in nature with edema. The patients care to date has included evaluation and follow per Dr. Shanda Espino. The patient has significant underlying medical conditions as below. Clean and dry and very close to healing.   No issues this week     Wound Pain Timing/Severity: intermittent, mild  Quality of pain: dull, tender  Severity of pain:  2 / 10   Modifying Factors: edema, venous stasis, obesity and smoking  Associated Signs/Symptoms: edema, erythema, drainage and pain        PAST MEDICAL HISTORY        Diagnosis Date    Fracture     in ER 2021 fx left ankle- for surg 2021    History of alcohol abuse     Pericarditis     \"in 2015\" no longer forllow with cardiologist    Sleep apnea     had sleep study - uses cpap       PAST SURGICAL HISTORY    Past Surgical History:   Procedure Laterality Date    ANKLE FRACTURE SURGERY Left 2021    LEFT ANKLE OPEN REDUCTION INTERNAL FIXATION performed by Amanda Horner MD at [facility] Left 2022    LEFT ANKLE HARDWARE REMOVAL performed by Amanda Horner MD at [facility]    Family History   Problem Relation Age of Onset    Breast Cancer Mother     Cancer Father         lung cancer       SOCIAL HISTORY    Social History     Tobacco Use    Smoking status: Every Day     Packs/day: 1.50     Years: 25.00     Pack years: 37.50     Types: Cigarettes    Smokeless tobacco: Never    Tobacco comments:     instructed adverse effects   Vaping Use    Vaping Use: Never used   Substance Use Topics    Alcohol use: Yes     Comment: average\" 5-6 beers per day\"    Drug use: Not Currently     Types: Marijuana Darryle Pimple)     Comment: \"20+ yrs ago \"       ALLERGIES    No Known Allergies    MEDICATIONS    Current Outpatient Medications on File Prior to Encounter   Medication Sig Dispense Refill    Naproxen Sodium 220 MG CAPS Take 1 tablet by mouth daily as needed for Pain       No current facility-administered medications on file prior to encounter. REVIEW OF SYSTEMS    Pertinent items are noted in HPI. Constitutional: Negative for systemic symptoms including fever, chills and malaise. Objective:      BP (!) 148/88   Pulse (!) 107   Temp 97.6 °F (36.4 °C) (Temporal)   Resp 18     PHYSICAL EXAM      General: The patient is in no acute distress. Mental status:  Patient is appropriate, is  oriented to place and plan of care.   Dermatologic exam: Visual inspection of the periwound reveals the skin to be normal in turgor and texture and dry  Wound exam: see wound description below in procedure note      Assessment:     Problem List Items Addressed This Visit          Other    WD-Nonhealing nonsurgical wound with fat layer exposed - Primary    Relevant Orders    Initiate Outpatient Wound Care Protocol    WD-Skin ulcer of ankle with fat layer exposed [facility])    Relevant Orders    Initiate Outpatient Wound Care Protocol     Procedure Note    Indications:  Based on my examination of this patient's wound(s) today, sharp excision into necrotic subcutaneous tissue is required to promote healing and evaluate the extent of previous healing. Performed by: KISHA Francois CNP    Consent obtained: Yes    Time out taken:  Yes    Pain Control: 4% Lidocaine Liquid Topical        Debridement:Excisional Debridement    Using curette the wound(s) was/were sharply debrided down through and including the removal of subcutaneous tissue.         Devitalized Tissue Debrided:  fibrin, biofilm, slough, and exudate    Pre Debridement Measurements:  Are located in the Wound Documentation Flow Sheet    All active wounds listed below with today's date are evaluated  Wound(s)    debrided this date include # : 1     Post  Debridement Measurements:  Wound 05/10/22 Pretibial Left;Lateral (Active)   Wound Image   09/06/22 0918   Wound Etiology Non-Healing Surgical 09/06/22 0918   Dressing Status New dressing applied 08/30/22 1020   Wound Cleansed Soap and water 09/06/22 0918   Offloading for Diabetic Foot Ulcers Offloading boot 09/06/22 0918   Wound Length (cm) 1 cm 09/06/22 0918   Wound Width (cm) 0.1 cm 09/06/22 0918   Wound Depth (cm) 0.1 cm 09/06/22 0918   Wound Surface Area (cm^2) 0.1 cm^2 09/06/22 0918   Change in Wound Size % (l*w) 98.97 09/06/22 0918   Wound Volume (cm^3) 0.01 cm^3 09/06/22 0918   Wound Healing % 99 09/06/22 0918   Post-Procedure Length (cm) 1 cm 09/06/22 0939   Post-Procedure Width (cm) 0.1 cm 09/06/22 0939   Post-Procedure Depth (cm) 0.1 cm 09/06/22 0939   Post-Procedure Surface Area (cm^2) 0.1 cm^2 09/06/22 0939   Post-Procedure Volume (cm^3) 0.01 cm^3 09/06/22 0939   Distance Tunneling (cm) 0 cm 09/06/22 0918   Tunneling Position ___ O'Clock 0 09/06/22 0918   Undermining Starts ___ O'Clock 0 09/06/22 0918   Undermining Ends___ O'Clock 0 09/06/22 0918   Undermining Maxium Distance (cm) 0 09/06/22 0918   Wound Assessment Pink/red;Devitalized tissue 09/06/22 0918   Drainage Amount Small 09/06/22 0918   Drainage Description Serosanguinous 09/06/22 0918   Odor None 09/06/22 0918   Reyna-wound Assessment Intact;Fragile 09/06/22 6883 Margins Defined edges 09/06/22 0918   Wound Thickness Description not for Pressure Injury Full thickness 09/06/22 0918   Number of days: 119       Percent of Wound(s) Debrided: approximately 100%    Total  Area  Debrided:  0.1 sq cm     Bleeding:  Minimal    Hemostasis Achieved:  by pressure    Procedural Pain:  2  / 10     Post Procedural Pain:  0 / 10     Response to treatment:  Well tolerated by patient. Status of wound progress and description from last visit:   Patient agreeable to 1 more week with wrap. Would like to discharge next week if appropriate       Plan:       Discharge Instructions         [address]     NOTE: Upon discharge from the [address], you will receive a patient experience survey. We would be grateful if you would take the time to fill this survey out. Wound care order history:                 KERI's   Right  0.56     Left 0.5              Date: 5/10/22              Cultures:                 Grafts:  Applied for grafts 7/12/22              Antibiotics:           Continuing wound care orders and information:                 Residence:                Continue home health care with:              Your wound-care supplies will be provided by: Wound cleansing:              Do not scrub or use excessive force. Wash hands with soap and water before and after dressing changes. Prior to applying a clean dressing, cleanse wound with normal saline, wound cleanser, or mild soap and water. Ask the physician or nurse before getting the wound(s) wet in a shower     Daily Wound management:              Keep weight off wounds and reposition every 2 hours. Avoid standing for long periods of time. Apply wraps/stockings in AM and remove at bedtime. If swelling is present, elevate legs to the level of the heart or above for 30 minutes 4-5 times a day and/or when sitting. When taking antibiotics take entire prescription as ordered by physician do not stop taking until medicine is all gone. Orders for this week: 9/6/22        Culture of Left Lateral Ankle taken on 5/10/22. Rx: [facility] in [place]        Left Lateral Ankle - Wash with soap and water, pat dry. Apply Small amount of Anasept and Stimulen to wound bed. Cover with super absorber (Sorbex). Wrap with Coban 2. Leave in place until next week. WOUND VAC Discontinued 7/26/22. May return to [facility]. Follow up with Moshe Landau, CNP in 1 week in the wound care center. Call [phone] for any questions or concerns.   Date__________   Time__________        Treatment Note      Written Patient Dismissal Instructions Given            Electronically signed by KISHA Rahman CNP on 9/6/2022 at 9:43 AM

## 2022-09-06 NOTE — DISCHARGE INSTRUCTIONS
PHYSICIAN ORDERS AND DISCHARGE INSTRUCTIONS     NOTE: Upon discharge from the [address], you will receive a patient experience survey. We would be grateful if you would take the time to fill this survey out. Wound care order history:                 KERI's   Right  0.56     Left 0.5              Date: 5/10/22              Cultures:                 Grafts:  Applied for grafts 7/12/22              Antibiotics:           Continuing wound care orders and information:                 Residence:                Continue home health care with:              Your wound-care supplies will be provided by: Wound cleansing:              Do not scrub or use excessive force. Wash hands with soap and water before and after dressing changes. Prior to applying a clean dressing, cleanse wound with normal saline, wound cleanser, or mild soap and water. Ask the physician or nurse before getting the wound(s) wet in a shower     Daily Wound management:              Keep weight off wounds and reposition every 2 hours. Avoid standing for long periods of time. Apply wraps/stockings in AM and remove at bedtime. If swelling is present, elevate legs to the level of the heart or above for 30 minutes 4-5 times a day and/or when sitting. When taking antibiotics take entire prescription as ordered by physician do not stop taking until medicine is all gone. Orders for this week: 9/6/22        Culture of Left Lateral Ankle taken on 5/10/22. Rx: [facility] in [place]        Left Lateral Ankle - Wash with soap and water, pat dry. Apply Small amount of Anasept and Stimulen to wound bed. Cover with super absorber (Sorbex). Wrap with Coban 2. Leave in place until next week. WOUND VAC Discontinued 7/26/22. May return to [facility].            Follow up with Dariela Ribera CNP in 1 week in the wound care center. Call [phone] for any questions or concerns.   Date__________   Time__________

## 2022-09-06 NOTE — PROGRESS NOTES
Multilayer Compression Wrap   (Not Unna) Below the Knee    NAME:  Areli Fisher  YOB: 1970  MEDICAL RECORD NUMBER:  [id number]  DATE:  9/6/2022    Multilayer compression wrap: Removed old Multilayer wrap if indicated and wash leg with mild soap/water. Applied moisturizing agent to dry skin as needed. Applied primary and secondary dressing as ordered. Applied multilayered dressing below the knee to left lower leg. Instructed patient/caregiver not to remove dressing and to keep it clean and dry. Instructed patient/caregiver on complications to report to provider, such as pain, numbness in toes, heavy drainage, and slippage of dressing. Instructed patient on purpose of compression dressing and on activity and exercise recommendations.       Electronically signed by Onalee Cranker, LPN on 0/8/2578 at 2:26 AM

## 2022-09-13 ENCOUNTER — HOSPITAL ENCOUNTER (OUTPATIENT)
Dept: WOUND CARE | Age: 52
Discharge: HOME | End: 2022-09-13
Payer: COMMERCIAL

## 2022-09-13 DIAGNOSIS — L97.302: ICD-10-CM

## 2022-09-13 DIAGNOSIS — T14.8XXA NONHEALING NONSURGICAL WOUND WITH FAT LAYER EXPOSED: Primary | ICD-10-CM

## 2022-09-13 DIAGNOSIS — I83.023 VENOUS STASIS ULCER OF LEFT ANKLE WITH FAT LAYER EXPOSED WITH VARICOSE VEINS (HCC): ICD-10-CM

## 2022-09-13 DIAGNOSIS — L97.322 VENOUS STASIS ULCER OF LEFT ANKLE WITH FAT LAYER EXPOSED WITH VARICOSE VEINS (HCC): ICD-10-CM

## 2022-09-13 PROCEDURE — 11042 DBRDMT SUBQ TIS 1ST 20SQCM/<: CPT | Performed by: NURSE PRACTITIONER

## 2022-09-13 PROCEDURE — 11042 DBRDMT SUBQ TIS 1ST 20SQCM/<: CPT

## 2022-09-13 NOTE — PROGRESS NOTES
Wound Care Center Progress Note With Procedure    Eric Ramos  AGE: 50 y.o. GENDER: male  : 1970  EPISODE DATE:  2022     Subjective:     Chief Complaint   Patient presents with    Wound Check     Left Leg         HISTORY of PRESENT ILLNESS   Eric Ramos is a 54 y.o. male who presents to the [address] [place] [address] for evaluation and treatment of Chronic non-healing surgical  ulcer(s) of the left lateral ankle. The condition is of moderate severity. The ulcer has been present since 22 post left ankle hardware removal (related to pain) per Dr. Long Richard. He initially had left ankle open reduction internal fixation left ankle per Dr. Long Richard 21. The underlying cause was initially nonhealing surgical but related to the length of time the wound has been present and venous components to his wound it is now venous in nature with edema. The patients care to date has included evaluation and follow per Dr. Gloria Marcial. The patient has significant underlying medical conditions as below. Wound Pain Timing/Severity: intermittent, mild  Quality of pain: dull, tender  Severity of pain:  2  10   Modifying Factors: edema, venous stasis, obesity and smoking  Associated Signs/Symptoms: edema, erythema, drainage and pain     KERI: Right 0.56, left 0.5 as of 5/10/22     Arterial evaluation: based on wound; locations, assessment and healing progress        Venous Evaluation: based on wound; locations, assessment and healing progress     Wound infection: wound culture obtained 5/10/22     Diabetes: No  Smoking: Yes (approximately 1 pack per day). Patient counseled in length on smoking cessation including benefits of quitting and health risks of continuing to smoke including but not limited to lung cancer, COPD, risk of coronary artery disease. Patient verbalized understanding today, is not ready to quit.    Anticoagulant/Antiplatelet therapy: Low dose asprin  Immunosuppression: No  Obesity: Yes  Nutritional status:  well nourished. Discussed need for increased protein and calories for wound healing and good sources of protein (just over 7 grams for every 20 pounds of body weight). Animal-based foods high in protein (meat, poultry, fish, eggs, and dairy foods). Plant based foods high in protein (tofu, lentils, beans, chickpeas, nuts, quinoa and abeba seeds. Other History:     Patient educated on the 6 essential components necessary for wound healing: Circulation, Debridements, Proper Dressings and Topical Wound Products, Infection Control, Edema Control and Offloading. Patient educated on those factors that negatively effect or impact wound healing: smoking, obesity, uncontrolled diabetes, anticoagulant and immunosuppressive regimens, inadequate nutrition, untreated arterial and venous disease if applicable and measures to manage edema.           PAST MEDICAL HISTORY        Diagnosis Date    Fracture     in ER 7/24/2021 fx left ankle- for surg 7/30/2021    History of alcohol abuse     Pericarditis     \"in 2015\" no longer forllow with cardiologist    Sleep apnea     had sleep study 2014- uses cpap       PAST SURGICAL HISTORY    Past Surgical History:   Procedure Laterality Date    ANKLE FRACTURE SURGERY Left 7/30/2021    LEFT ANKLE OPEN REDUCTION INTERNAL FIXATION performed by Mary Erazo MD at [facility] Left 1/19/2022    LEFT ANKLE HARDWARE REMOVAL performed by Mary Erazo MD at [facility]    Family History   Problem Relation Age of Onset    Breast Cancer Mother     Cancer Father         lung cancer       SOCIAL HISTORY    Social History     Tobacco Use    Smoking status: Every Day     Packs/day: 1.50     Years: 25.00     Pack years: 37.50     Types: Cigarettes    Smokeless tobacco: Never    Tobacco comments:     instructed adverse effects   Vaping Use    Vaping Use: Never used   Substance Use Topics    Alcohol use: Yes     Comment: average\" 5-6 beers per day\"    Drug use: Not Currently Types: Marijuana (Weed)     Comment: \"20+ yrs ago \"       ALLERGIES    No Known Allergies    MEDICATIONS    Current Outpatient Medications on File Prior to Encounter   Medication Sig Dispense Refill    Naproxen Sodium 220 MG CAPS Take 1 tablet by mouth daily as needed for Pain       No current facility-administered medications on file prior to encounter. REVIEW OF SYSTEMS    Pertinent items are noted in HPI. Constitutional: Negative for systemic symptoms including fever, chills and malaise. Objective: There were no vitals taken for this visit. PHYSICAL EXAM    General: The patient is in no acute distress. Mental status:  Patient is appropriate, is  oriented to place and plan of care. Dermatologic exam: Visual inspection of the periwound reveals the skin to be edematous  Wound exam: see wound description below in procedure note      Assessment:     Problem List Items Addressed This Visit          Other    WD-Nonhealing nonsurgical wound with fat layer exposed - Primary    WD-Skin ulcer of ankle with fat layer exposed ([address]    WD-Venous stasis ulcer of ankle with fat layer exposed ([address]     Procedure Note    Indications:  Based on my examination of this patient's wound(s) today, sharp excision into necrotic subcutaneous tissue is required to promote healing and evaluate the extent of previous healing. Performed by: KISHA Baldwin CNP    Consent obtained: Yes    Time out taken:  Yes    Pain Control:       Debridement:Excisional Debridement    Using curette the wound(s) was/were sharply debrided down through and including the removal of subcutaneous tissue.         Devitalized Tissue Debrided:  slough and exudate    Pre Debridement Measurements:  Are located in the Wound Documentation Flow Sheet    All active wounds listed below with today's date are evaluated  Wound(s)    debrided this date include # : 1     Post  Debridement Measurements:  Wound 05/10/22 Pretibial Left;Lateral (Active)   Wound Image   09/06/22 0918   Wound Etiology Non-Healing Surgical 09/13/22 1002   Dressing Status New dressing applied 09/13/22 1039   Wound Cleansed Soap and water 09/06/22 0918   Offloading for Diabetic Foot Ulcers Offloading boot 09/13/22 1039   Wound Length (cm) 1 cm 09/13/22 1002   Wound Width (cm) 0.1 cm 09/13/22 1002   Wound Depth (cm) 0.1 cm 09/13/22 1002   Wound Surface Area (cm^2) 0.1 cm^2 09/13/22 1002   Change in Wound Size % (l*w) 98.97 09/13/22 1002   Wound Volume (cm^3) 0.01 cm^3 09/13/22 1002   Wound Healing % 99 09/13/22 1002   Post-Procedure Length (cm) 1 cm 09/13/22 1002   Post-Procedure Width (cm) 0.1 cm 09/13/22 1002   Post-Procedure Depth (cm) 0.1 cm 09/13/22 1002   Post-Procedure Surface Area (cm^2) 0.1 cm^2 09/13/22 1002   Post-Procedure Volume (cm^3) 0.01 cm^3 09/13/22 1002   Distance Tunneling (cm) 0 cm 09/13/22 1002   Tunneling Position ___ O'Clock 0 09/13/22 1002   Undermining Starts ___ O'Clock 0 09/13/22 1002   Undermining Ends___ O'Clock 0 09/13/22 1002   Undermining Maxium Distance (cm) 0 09/13/22 1002   Wound Assessment Pink/red;Devitalized tissue 09/13/22 1002   Drainage Amount Small 09/13/22 1002   Drainage Description Serosanguinous 09/13/22 1002   Odor None 09/13/22 1002   Reyna-wound Assessment Intact;Fragile 09/13/22 1002   Margins Defined edges 09/13/22 1002   Wound Thickness Description not for Pressure Injury Full thickness 09/13/22 1002   Number of days: 126       Percent of Wound(s) Debrided: approximately 100%    Total  Area  Debrided: 0.1 sq cm     Bleeding:  Minimal    Hemostasis Achieved:  by pressure    Procedural Pain:  0  / 10     Post Procedural Pain:  0 / 10     Response to treatment:  Well tolerated by patient. Status of wound progress and description from last visit: Almost healed, continue regiment with compression wrap. NPWT has been discontinued.   Plan:       Discharge Instructions         PHYSICIAN ORDERS AND DISCHARGE INSTRUCTIONS     NOTE: Upon discharge from the [address], you will receive a patient experience survey. We would be grateful if you would take the time to fill this survey out. Wound care order history:                 KERI's   Right  0.56     Left 0.5              Date: 5/10/22              Cultures:                 Grafts:  Applied for grafts 7/12/22              Antibiotics:           Continuing wound care orders and information:                 Residence:                Continue home health care with:              Your wound-care supplies will be provided by: Wound cleansing:              Do not scrub or use excessive force. Wash hands with soap and water before and after dressing changes. Prior to applying a clean dressing, cleanse wound with normal saline, wound cleanser, or mild soap and water. Ask the physician or nurse before getting the wound(s) wet in a shower     Daily Wound management:              Keep weight off wounds and reposition every 2 hours. Avoid standing for long periods of time. Apply wraps/stockings in AM and remove at bedtime. If swelling is present, elevate legs to the level of the heart or above for 30 minutes 4-5 times a day and/or when sitting. When taking antibiotics take entire prescription as ordered by physician do not stop taking until medicine is all gone. Orders for this week: 9/6/22        Culture of Left Lateral Ankle taken on 5/10/22. Rx: [facility] in [place]        Left Lateral Ankle - Wash with soap and water, pat dry. Apply Stacy to wound bed. Wrap with Coban 2. Leave in place until next week. WOUND VAC Discontinued 7/26/22. May return to [facility]. Follow up with Janee Flores CNP in 1 week in the wound care center. Call [phone] for any questions or concerns.   Date__________ Time__________        Treatment Note Wound 05/10/22 Pretibial Left;Lateral-Dressing/Treatment:  (silke ABD coban 2)    Written Patient Dismissal Instructions Given            Electronically signed by KISHA Adams CNP on 9/13/2022 at 11:21 AM

## 2022-09-13 NOTE — PROGRESS NOTES
Multilayer Compression Wrap   (Not Unna) Below the Knee    NAME:  Harjeet Cai  YOB: 1970  MEDICAL RECORD NUMBER:  [id number]  DATE:  9/13/2022    Multilayer compression wrap: Removed old Multilayer wrap if indicated and wash leg with mild soap/water. Applied moisturizing agent to dry skin as needed. Applied primary and secondary dressing as ordered. Applied multilayered dressing below the knee to left lower leg. Instructed patient/caregiver not to remove dressing and to keep it clean and dry. Instructed patient/caregiver on complications to report to provider, such as pain, numbness in toes, heavy drainage, and slippage of dressing. Instructed patient on purpose of compression dressing and on activity and exercise recommendations.       Electronically signed by Adryan Stokes LPN on 4/85/6827 at 45:05 AM

## 2022-09-22 ENCOUNTER — HOSPITAL ENCOUNTER (OUTPATIENT)
Dept: WOUND CARE | Age: 52
Discharge: HOME | End: 2022-09-22
Payer: COMMERCIAL

## 2022-09-22 VITALS
HEART RATE: 78 BPM | SYSTOLIC BLOOD PRESSURE: 161 MMHG | TEMPERATURE: 96 F | DIASTOLIC BLOOD PRESSURE: 83 MMHG | RESPIRATION RATE: 18 BRPM

## 2022-09-22 DIAGNOSIS — T14.8XXA NONHEALING NONSURGICAL WOUND WITH FAT LAYER EXPOSED: Primary | ICD-10-CM

## 2022-09-22 DIAGNOSIS — L97.322 SKIN ULCER OF LEFT ANKLE WITH FAT LAYER EXPOSED (HCC): ICD-10-CM

## 2022-09-22 PROCEDURE — 11042 DBRDMT SUBQ TIS 1ST 20SQCM/<: CPT

## 2022-09-22 PROCEDURE — 11042 DBRDMT SUBQ TIS 1ST 20SQCM/<: CPT | Performed by: NURSE PRACTITIONER

## 2022-09-22 RX ORDER — BACITRACIN ZINC AND POLYMYXIN B SULFATE 500; 1000 [USP'U]/G; [USP'U]/G
OINTMENT TOPICAL ONCE
Status: CANCELLED | OUTPATIENT
Start: 2022-09-22 | End: 2022-09-22

## 2022-09-22 RX ORDER — LIDOCAINE 50 MG/G
OINTMENT TOPICAL ONCE
Status: CANCELLED | OUTPATIENT
Start: 2022-09-22 | End: 2022-09-22

## 2022-09-22 RX ORDER — GENTAMICIN SULFATE 1 MG/G
OINTMENT TOPICAL ONCE
Status: CANCELLED | OUTPATIENT
Start: 2022-09-22 | End: 2022-09-22

## 2022-09-22 RX ORDER — GINSENG 100 MG
CAPSULE ORAL ONCE
Status: CANCELLED | OUTPATIENT
Start: 2022-09-22 | End: 2022-09-22

## 2022-09-22 RX ORDER — LIDOCAINE 40 MG/G
CREAM TOPICAL ONCE
Status: CANCELLED | OUTPATIENT
Start: 2022-09-22 | End: 2022-09-22

## 2022-09-22 RX ORDER — CLOBETASOL PROPIONATE 0.5 MG/G
OINTMENT TOPICAL ONCE
Status: CANCELLED | OUTPATIENT
Start: 2022-09-22 | End: 2022-09-22

## 2022-09-22 RX ORDER — BETAMETHASONE DIPROPIONATE 0.05 %
OINTMENT (GRAM) TOPICAL ONCE
Status: CANCELLED | OUTPATIENT
Start: 2022-09-22 | End: 2022-09-22

## 2022-09-22 RX ORDER — BACITRACIN, NEOMYCIN, POLYMYXIN B 400; 3.5; 5 [USP'U]/G; MG/G; [USP'U]/G
OINTMENT TOPICAL ONCE
Status: CANCELLED | OUTPATIENT
Start: 2022-09-22 | End: 2022-09-22

## 2022-09-22 RX ORDER — LIDOCAINE HYDROCHLORIDE 40 MG/ML
SOLUTION TOPICAL ONCE
Status: CANCELLED | OUTPATIENT
Start: 2022-09-22 | End: 2022-09-22

## 2022-09-22 NOTE — PROGRESS NOTES
Wound Care Center Progress Note With Procedure    Danae Rosado  AGE: 50 y.o. GENDER: male  : 1970  EPISODE DATE:  2022     Subjective:     Chief Complaint   Patient presents with    Wound Check     LLE         HISTORY of PRESENT ILLNESS   Danae Rosado is a 54 y.o. male who presents to the [address] [place] [address] for evaluation and treatment of Chronic non-healing surgical  ulcer(s) of the left lateral ankle. The condition is of moderate severity. The ulcer has been present since 22 post left ankle hardware removal (related to pain) per Dr. Pratik Dumont. He initially had left ankle open reduction internal fixation left ankle per Dr. Pratik Dumont 21. The underlying cause was initially nonhealing surgical but related to the length of time the wound has been present and venous components to his wound it is now venous in nature with edema. The patients care to date has included evaluation and follow per Dr. Roni Lorenzo. The patient has significant underlying medical conditions as below. Wound Pain Timing/Severity: intermittent, mild  Quality of pain: dull, tender  Severity of pain:  2 / 10   Modifying Factors: edema, venous stasis, obesity and smoking  Associated Signs/Symptoms: edema, erythema, drainage and pain     KERI: Right 0.56, left 0.5 as of 5/10/22     Arterial evaluation: based on wound; locations, assessment and healing progress        Venous Evaluation: based on wound; locations, assessment and healing progress     Wound infection: wound culture obtained 5/10/22     Diabetes: No  Smoking: Yes (approximately 1 pack per day). Patient counseled in length on smoking cessation including benefits of quitting and health risks of continuing to smoke including but not limited to lung cancer, COPD, risk of coronary artery disease. Patient verbalized understanding today, is not ready to quit.    Anticoagulant/Antiplatelet therapy: Low dose asprin  Immunosuppression: No  Obesity: Yes  Nutritional status:  well nourished. Discussed need for increased protein and calories for wound healing and good sources of protein (just over 7 grams for every 20 pounds of body weight). Animal-based foods high in protein (meat, poultry, fish, eggs, and dairy foods). Plant based foods high in protein (tofu, lentils, beans, chickpeas, nuts, quinoa and abeba seeds. Other History:     Patient educated on the 6 essential components necessary for wound healing: Circulation, Debridements, Proper Dressings and Topical Wound Products, Infection Control, Edema Control and Offloading. Patient educated on those factors that negatively effect or impact wound healing: smoking, obesity, uncontrolled diabetes, anticoagulant and immunosuppressive regimens, inadequate nutrition, untreated arterial and venous disease if applicable and measures to manage edema.           PAST MEDICAL HISTORY        Diagnosis Date    Fracture     in ER 7/24/2021 fx left ankle- for surg 7/30/2021    History of alcohol abuse     Pericarditis     \"in 2015\" no longer forllow with cardiologist    Sleep apnea     had sleep study 2014- uses cpap       PAST SURGICAL HISTORY    Past Surgical History:   Procedure Laterality Date    ANKLE FRACTURE SURGERY Left 7/30/2021    LEFT ANKLE OPEN REDUCTION INTERNAL FIXATION performed by Tess Rodriguez MD at [facility] Left 1/19/2022    LEFT ANKLE HARDWARE REMOVAL performed by Tess Rodriguez MD at [facility]    Family History   Problem Relation Age of Onset    Breast Cancer Mother     Cancer Father         lung cancer       SOCIAL HISTORY    Social History     Tobacco Use    Smoking status: Every Day     Packs/day: 1.50     Years: 25.00     Pack years: 37.50     Types: Cigarettes    Smokeless tobacco: Never    Tobacco comments:     instructed adverse effects   Vaping Use    Vaping Use: Never used   Substance Use Topics    Alcohol use: Yes     Comment: average\" 5-6 beers per day\"    Drug use: Not Currently Types: Marijuana (Weed)     Comment: \"20+ yrs ago \"       ALLERGIES    No Known Allergies    MEDICATIONS    Current Outpatient Medications on File Prior to Encounter   Medication Sig Dispense Refill    Naproxen Sodium 220 MG CAPS Take 1 tablet by mouth daily as needed for Pain       No current facility-administered medications on file prior to encounter. REVIEW OF SYSTEMS    Pertinent items are noted in HPI. Constitutional: Negative for systemic symptoms including fever, chills and malaise. Objective:      BP (!) 161/83   Pulse 78   Temp (!) 96 °F (35.6 °C) (Temporal)   Resp 18     PHYSICAL EXAM    General: The patient is in no acute distress. Mental status:  Patient is appropriate, is  oriented to place and plan of care. Dermatologic exam: Visual inspection of the periwound reveals the skin to be edematous  Wound exam: see wound description below in procedure note      Assessment:     Problem List Items Addressed This Visit          Other    WD-Nonhealing nonsurgical wound with fat layer exposed - Primary    Relevant Orders    Initiate Outpatient Wound Care Protocol    WD-Skin ulcer of ankle with fat layer exposed ([address]    Relevant Orders    Initiate Outpatient Wound Care Protocol     Procedure Note    Indications:  Based on my examination of this patient's wound(s) today, sharp excision into necrotic subcutaneous tissue is required to promote healing and evaluate the extent of previous healing. Performed by: KISHA Raymundo - CNP    Consent obtained: Yes    Time out taken:  Yes    Pain Control:       Debridement:Excisional Debridement    Using curette the wound(s) was/were sharply debrided down through and including the removal of subcutaneous tissue.         Devitalized Tissue Debrided:  slough and exudate    Pre Debridement Measurements:  Are located in the Wound Documentation Flow Sheet    All active wounds listed below with today's date are evaluated  Wound(s)    debrided this date include # : 1     Post  Debridement Measurements:  Wound 05/10/22 Pretibial Left;Lateral (Active)   Wound Image   09/22/22 1621   Wound Etiology Non-Healing Surgical 09/22/22 1621   Dressing Status New dressing applied 09/13/22 1039   Wound Cleansed Soap and water 09/22/22 1621   Offloading for Diabetic Foot Ulcers Offloading boot 09/13/22 1039   Wound Length (cm) 0 cm 09/22/22 1621   Wound Width (cm) 0 cm 09/22/22 1621   Wound Depth (cm) 0 cm 09/22/22 1621   Wound Surface Area (cm^2) 0 cm^2 09/22/22 1621   Change in Wound Size % (l*w) 100 09/22/22 1621   Wound Volume (cm^3) 0 cm^3 09/22/22 1621   Wound Healing % 100 09/22/22 1621   Post-Procedure Length (cm) 0.3 cm 09/22/22 1636   Post-Procedure Width (cm) 0.1 cm 09/22/22 1636   Post-Procedure Depth (cm) 0.1 cm 09/22/22 1636   Post-Procedure Surface Area (cm^2) 0.03 cm^2 09/22/22 1636   Post-Procedure Volume (cm^3) 0.003 cm^3 09/22/22 1636   Distance Tunneling (cm) 0 cm 09/22/22 1621   Tunneling Position ___ O'Clock 0 09/22/22 1621   Undermining Starts ___ O'Clock 0 09/22/22 1621   Undermining Ends___ O'Clock 0 09/22/22 1621   Undermining Maxium Distance (cm) 0 09/22/22 1621   Wound Assessment Devitalized tissue;Dry 09/22/22 1621   Drainage Amount Scant 09/22/22 1621   Drainage Description Yellow 09/22/22 1621   Odor None 09/22/22 1621   Reyna-wound Assessment Intact;Fragile 09/22/22 1621   Margins Attached edges 09/22/22 1621   Wound Thickness Description not for Pressure Injury Full thickness 09/22/22 1621   Number of days: 135     Percent of Wound(s) Debrided: approximately 100%    Total  Area  Debrided: 0.03 sq cm     Bleeding:  Minimal    Hemostasis Achieved:  by pressure    Procedural Pain:  0  / 10     Post Procedural Pain:  0 / 10     Response to treatment:  Well tolerated by patient.      Status of wound progress and description from last visit: Almost healed, will switch to home compression this week an follow up in one week to make sure no further problems. Plan:       Discharge Instructions         PHYSICIAN ORDERS AND DISCHARGE INSTRUCTIONS     NOTE: Upon discharge from the [address], you will receive a patient experience survey. We would be grateful if you would take the time to fill this survey out. Wound care order history:                 KERI's   Right  0.56     Left 0.5              Date: 5/10/22              Cultures:                 Grafts:  Applied for grafts 7/12/22              Antibiotics:           Continuing wound care orders and information:                 Residence:                Continue home health care with:              Your wound-care supplies will be provided by: Wound cleansing:              Do not scrub or use excessive force. Wash hands with soap and water before and after dressing changes. Prior to applying a clean dressing, cleanse wound with normal saline, wound cleanser, or mild soap and water. Ask the physician or nurse before getting the wound(s) wet in a shower     Daily Wound management:              Keep weight off wounds and reposition every 2 hours. Avoid standing for long periods of time. Apply wraps/stockings in AM and remove at bedtime. If swelling is present, elevate legs to the level of the heart or above for 30 minutes 4-5 times a day and/or when sitting. When taking antibiotics take entire prescription as ordered by physician do not stop taking until medicine is all gone. Orders for this week: 9/22/22        Culture of Left Lateral Ankle taken on 5/10/22. Rx: Drug [facility] in [place]        Left Lateral Ankle - Wash with soap and water, pat dry. Apply Stacy to wound bed. Cover with Silicone border. Wear [address] in place for 1 week. Work on getting a pair of 20mmhg compression stockings.         WOUND VAC Discontinued 7/26/22. May return to [facility]. Follow up with Alejandro Powers CNP in 1 week in the wound care center. Call [phone] for any questions or concerns.   Date__________   Time__________        Treatment Note      Written Patient Dismissal Instructions Given            Electronically signed by KISHA Calderon CNP on 9/22/2022 at 4:51 PM

## 2022-09-22 NOTE — DISCHARGE INSTRUCTIONS
PHYSICIAN ORDERS AND DISCHARGE INSTRUCTIONS     NOTE: Upon discharge from the [address], you will receive a patient experience survey. We would be grateful if you would take the time to fill this survey out. Wound care order history:                 KERI's   Right  0.56     Left 0.5              Date: 5/10/22              Cultures:                 Grafts:  Applied for grafts 7/12/22              Antibiotics:           Continuing wound care orders and information:                 Residence:                Continue home health care with:              Your wound-care supplies will be provided by: Wound cleansing:              Do not scrub or use excessive force. Wash hands with soap and water before and after dressing changes. Prior to applying a clean dressing, cleanse wound with normal saline, wound cleanser, or mild soap and water. Ask the physician or nurse before getting the wound(s) wet in a shower     Daily Wound management:              Keep weight off wounds and reposition every 2 hours. Avoid standing for long periods of time. Apply wraps/stockings in AM and remove at bedtime. If swelling is present, elevate legs to the level of the heart or above for 30 minutes 4-5 times a day and/or when sitting. When taking antibiotics take entire prescription as ordered by physician do not stop taking until medicine is all gone. Orders for this week: 9/22/22        Culture of Left Lateral Ankle taken on 5/10/22. Rx: Drug [facility] in [place]        Left Lateral Ankle - Wash with soap and water, pat dry. Apply Stacy to wound bed. Cover with Silicone border. Wear [address] in place for 1 week. Work on getting a pair of 20mmhg compression stockings. WOUND VAC Discontinued 7/26/22. May return to [facility].            Follow up with [facility] in 1 week in the wound care center. Call [phone] for any questions or concerns.   Date__________   Time__________

## 2022-09-29 ENCOUNTER — HOSPITAL ENCOUNTER (OUTPATIENT)
Dept: WOUND CARE | Age: 52
Discharge: HOME | End: 2022-09-29
Payer: COMMERCIAL

## 2022-09-29 VITALS
HEART RATE: 89 BPM | TEMPERATURE: 96.3 F | RESPIRATION RATE: 18 BRPM | SYSTOLIC BLOOD PRESSURE: 191 MMHG | DIASTOLIC BLOOD PRESSURE: 87 MMHG

## 2022-09-29 DIAGNOSIS — T14.8XXA NONHEALING NONSURGICAL WOUND WITH FAT LAYER EXPOSED: Primary | ICD-10-CM

## 2022-09-29 DIAGNOSIS — L97.322 SKIN ULCER OF LEFT ANKLE WITH FAT LAYER EXPOSED (HCC): ICD-10-CM

## 2022-09-29 PROCEDURE — 11042 DBRDMT SUBQ TIS 1ST 20SQCM/<: CPT | Performed by: NURSE PRACTITIONER

## 2022-09-29 PROCEDURE — 11042 DBRDMT SUBQ TIS 1ST 20SQCM/<: CPT

## 2022-09-29 RX ORDER — CLOBETASOL PROPIONATE 0.5 MG/G
OINTMENT TOPICAL ONCE
Status: CANCELLED | OUTPATIENT
Start: 2022-09-29 | End: 2022-09-29

## 2022-09-29 RX ORDER — GINSENG 100 MG
CAPSULE ORAL ONCE
Status: CANCELLED | OUTPATIENT
Start: 2022-09-29 | End: 2022-09-29

## 2022-09-29 RX ORDER — LIDOCAINE 50 MG/G
OINTMENT TOPICAL ONCE
Status: CANCELLED | OUTPATIENT
Start: 2022-09-29 | End: 2022-09-29

## 2022-09-29 RX ORDER — LIDOCAINE HYDROCHLORIDE 40 MG/ML
SOLUTION TOPICAL ONCE
Status: CANCELLED | OUTPATIENT
Start: 2022-09-29 | End: 2022-09-29

## 2022-09-29 RX ORDER — LIDOCAINE 40 MG/G
CREAM TOPICAL ONCE
Status: CANCELLED | OUTPATIENT
Start: 2022-09-29 | End: 2022-09-29

## 2022-09-29 RX ORDER — BACITRACIN, NEOMYCIN, POLYMYXIN B 400; 3.5; 5 [USP'U]/G; MG/G; [USP'U]/G
OINTMENT TOPICAL ONCE
Status: CANCELLED | OUTPATIENT
Start: 2022-09-29 | End: 2022-09-29

## 2022-09-29 RX ORDER — BACITRACIN ZINC AND POLYMYXIN B SULFATE 500; 1000 [USP'U]/G; [USP'U]/G
OINTMENT TOPICAL ONCE
Status: CANCELLED | OUTPATIENT
Start: 2022-09-29 | End: 2022-09-29

## 2022-09-29 RX ORDER — GENTAMICIN SULFATE 1 MG/G
OINTMENT TOPICAL ONCE
Status: CANCELLED | OUTPATIENT
Start: 2022-09-29 | End: 2022-09-29

## 2022-09-29 RX ORDER — BETAMETHASONE DIPROPIONATE 0.05 %
OINTMENT (GRAM) TOPICAL ONCE
Status: CANCELLED | OUTPATIENT
Start: 2022-09-29 | End: 2022-09-29

## 2022-09-29 ASSESSMENT — PAIN SCALES - GENERAL: PAINLEVEL_OUTOF10: 0

## 2022-09-29 NOTE — DISCHARGE INSTRUCTIONS
PHYSICIAN ORDERS AND DISCHARGE INSTRUCTIONS     NOTE: Upon discharge from the [address], you will receive a patient experience survey. We would be grateful if you would take the time to fill this survey out. Wound care order history:                 KERI's   Right  0.56     Left 0.5              Date: 5/10/22              Cultures:                 Grafts:  Applied for grafts 7/12/22              Antibiotics:           Continuing wound care orders and information:                 Residence:                Continue home health care with:              Your wound-care supplies will be provided by: Wound cleansing:              Do not scrub or use excessive force. Wash hands with soap and water before and after dressing changes. Prior to applying a clean dressing, cleanse wound with normal saline, wound cleanser, or mild soap and water. Ask the physician or nurse before getting the wound(s) wet in a shower     Daily Wound management:              Keep weight off wounds and reposition every 2 hours. Avoid standing for long periods of time. Apply wraps/stockings in AM and remove at bedtime. If swelling is present, elevate legs to the level of the heart or above for 30 minutes 4-5 times a day and/or when sitting. When taking antibiotics take entire prescription as ordered by physician do not stop taking until medicine is all gone. Orders for this week: 9/29/22        Culture of Left Lateral Ankle taken on 5/10/22. Rx: Drug [facility] in [place]        Left Lateral Ankle - Wash with soap and water, pat dry. Apply Stacy to wound bed. Cover with Silicone border. Leave in place for 1 week. Work on getting a pair of 20mmhg compression stockings. Wear during the day, may remove at night. WOUND VAC Discontinued 7/26/22. May return to [facility]. Follow up with Alejandro Powers CNP in 1 week in the wound care center. Call [phone] for any questions or concerns.   Date__________   Time__________

## 2022-09-29 NOTE — PROGRESS NOTES
Wound Care Center Progress Note With Procedure    Eric Ramos  AGE: 50 y.o. GENDER: male  : 1970  EPISODE DATE:  2022     Subjective:     Chief Complaint   Patient presents with    Wound Check     Left leg         HISTORY of PRESENT ILLNESS   Eric Ramos is a 54 y.o. male who presents to the [address] [place] [address] for evaluation and treatment of Chronic non-healing surgical  ulcer(s) of the left lateral ankle. The condition is of moderate severity. The ulcer has been present since 22 post left ankle hardware removal (related to pain) per Dr. Long Richard. He initially had left ankle open reduction internal fixation left ankle per Dr. Long Richard 21. The underlying cause was initially nonhealing surgical but related to the length of time the wound has been present and venous components to his wound it is now venous in nature with edema. The patients care to date has included evaluation and follow per Dr. Gloria Marcial. The patient has significant underlying medical conditions as below. Wound Pain Timing/Severity: intermittent, mild  Quality of pain: dull, tender  Severity of pain:  2  10   Modifying Factors: edema, venous stasis, obesity and smoking  Associated Signs/Symptoms: edema, erythema, drainage and pain     KERI: Right 0.56, left 0.5 as of 5/10/22     Arterial evaluation: based on wound; locations, assessment and healing progress        Venous Evaluation: based on wound; locations, assessment and healing progress     Wound infection: wound culture obtained 5/10/22     Diabetes: No  Smoking: Yes (approximately 1 pack per day). Patient counseled in length on smoking cessation including benefits of quitting and health risks of continuing to smoke including but not limited to lung cancer, COPD, risk of coronary artery disease. Patient verbalized understanding today, is not ready to quit.    Anticoagulant/Antiplatelet therapy: Low dose asprin  Immunosuppression: No  Obesity: Yes  Nutritional status:  well nourished. Discussed need for increased protein and calories for wound healing and good sources of protein (just over 7 grams for every 20 pounds of body weight). Animal-based foods high in protein (meat, poultry, fish, eggs, and dairy foods). Plant based foods high in protein (tofu, lentils, beans, chickpeas, nuts, quinoa and abeba seeds. Other History:     Patient educated on the 6 essential components necessary for wound healing: Circulation, Debridements, Proper Dressings and Topical Wound Products, Infection Control, Edema Control and Offloading. Patient educated on those factors that negatively effect or impact wound healing: smoking, obesity, uncontrolled diabetes, anticoagulant and immunosuppressive regimens, inadequate nutrition, untreated arterial and venous disease if applicable and measures to manage edema.           PAST MEDICAL HISTORY        Diagnosis Date    Fracture     in ER 7/24/2021 fx left ankle- for surg 7/30/2021    History of alcohol abuse     Pericarditis     \"in 2015\" no longer forllow with cardiologist    Sleep apnea     had sleep study 2014- uses cpap       PAST SURGICAL HISTORY    Past Surgical History:   Procedure Laterality Date    ANKLE FRACTURE SURGERY Left 7/30/2021    LEFT ANKLE OPEN REDUCTION INTERNAL FIXATION performed by Logan Driver MD at [facility] Left 1/19/2022    LEFT ANKLE HARDWARE REMOVAL performed by Logan Driver MD at [address] [place] [address]    Family History   Problem Relation Age of Onset    Breast Cancer Mother     Cancer Father         lung cancer       SOCIAL HISTORY    Social History     Tobacco Use    Smoking status: Every Day     Packs/day: 1.50     Years: 25.00     Pack years: 37.50     Types: Cigarettes    Smokeless tobacco: Never    Tobacco comments:     instructed adverse effects   Vaping Use    Vaping Use: Never used   Substance Use Topics    Alcohol use: Yes     Comment: average\" 5-6 beers per day\"    Drug use: Not Currently Types: Marijuana (Weed)     Comment: \"20+ yrs ago \"       ALLERGIES    No Known Allergies    MEDICATIONS    Current Outpatient Medications on File Prior to Encounter   Medication Sig Dispense Refill    Naproxen Sodium 220 MG CAPS Take 1 tablet by mouth daily as needed for Pain       No current facility-administered medications on file prior to encounter. REVIEW OF SYSTEMS    Pertinent items are noted in HPI. Constitutional: Negative for systemic symptoms including fever, chills and malaise. Objective:      BP (!) 191/87   Pulse 89   Temp (!) 96.3 °F (35.7 °C) (Temporal)   Resp 18     PHYSICAL EXAM    General: The patient is in no acute distress. Mental status:  Patient is appropriate, is  oriented to place and plan of care. Dermatologic exam: Visual inspection of the periwound reveals the skin to be edematous  Wound exam: see wound description below in procedure note      Assessment:     Problem List Items Addressed This Visit          Other    WD-Nonhealing nonsurgical wound with fat layer exposed - Primary    Relevant Orders    Initiate Outpatient Wound Care Protocol    WD-Skin ulcer of ankle with fat layer exposed ([address]    Relevant Orders    Initiate Outpatient Wound Care Protocol     Procedure Note    Indications:  Based on my examination of this patient's wound(s) today, sharp excision into necrotic subcutaneous tissue is required to promote healing and evaluate the extent of previous healing. Performed by: KISHA Stovall - CNP    Consent obtained: Yes    Time out taken:  Yes    Pain Control:       Debridement:Excisional Debridement    Using curette the wound(s) was/were sharply debrided down through and including the removal of subcutaneous tissue.         Devitalized Tissue Debrided:  slough and exudate    Pre Debridement Measurements:  Are located in the Wound Documentation Flow Sheet    All active wounds listed below with today's date are evaluated  Wound(s)    debrided this date include # : 1     Post  Debridement Measurements:  Wound 05/10/22, #1 Pretibial Left;Lateral (Active)   Wound Image   09/22/22 1621   Wound Etiology Non-Healing Surgical 09/29/22 1529   Dressing Status New dressing applied;Clean;Dry; Intact 09/29/22 1541   Wound Cleansed Wound cleanser 09/29/22 1529   Offloading for Diabetic Foot Ulcers Offloading not ordered 09/29/22 1529   Wound Length (cm) 0 cm 09/29/22 1529   Wound Width (cm) 0 cm 09/29/22 1529   Wound Depth (cm) 0 cm 09/29/22 1529   Wound Surface Area (cm^2) 0 cm^2 09/29/22 1529   Change in Wound Size % (l*w) 100 09/29/22 1529   Wound Volume (cm^3) 0 cm^3 09/29/22 1529   Wound Healing % 100 09/29/22 1529   Post-Procedure Length (cm) 0.2 cm 09/29/22 1541   Post-Procedure Width (cm) 0.1 cm 09/29/22 1541   Post-Procedure Depth (cm) 0.1 cm 09/29/22 1541   Post-Procedure Surface Area (cm^2) 0.02 cm^2 09/29/22 1541   Post-Procedure Volume (cm^3) 0.002 cm^3 09/29/22 1541   Distance Tunneling (cm) 0 cm 09/29/22 1529   Tunneling Position ___ O'Clock 0 09/29/22 1529   Undermining Starts ___ O'Clock 0 09/29/22 1529   Undermining Ends___ O'Clock 0 09/29/22 1529   Undermining Maxium Distance (cm) 0 09/29/22 1529   Wound Assessment Devitalized tissue;Dry 09/29/22 1529   Drainage Amount Scant 09/29/22 1529   Drainage Description Yellow 09/29/22 1529   Odor None 09/29/22 1529   Reyna-wound Assessment Intact;Fragile 09/29/22 1529   Margins Attached edges 09/29/22 1529   Wound Thickness Description not for Pressure Injury Full thickness 09/29/22 1529   Number of days: 142       Percent of Wound(s) Debrided: approximately 100%    Total  Area  Debrided: 0.02 sq cm     Bleeding:  Minimal    Hemostasis Achieved:  by pressure    Procedural Pain:  0  / 10     Post Procedural Pain:  0 / 10     Response to treatment:  Well tolerated by patient.      Status of wound progress and description from last visit: Almost healed, will switch to home compression this week an follow up in one week to make sure no further problems. Plan:       Discharge Instructions         PHYSICIAN ORDERS AND DISCHARGE INSTRUCTIONS     NOTE: Upon discharge from the [address], you will receive a patient experience survey. We would be grateful if you would take the time to fill this survey out. Wound care order history:                 KERI's   Right  0.56     Left 0.5              Date: 5/10/22              Cultures:                 Grafts:  Applied for grafts 7/12/22              Antibiotics:           Continuing wound care orders and information:                 Residence:                Continue home health care with:              Your wound-care supplies will be provided by: Wound cleansing:              Do not scrub or use excessive force. Wash hands with soap and water before and after dressing changes. Prior to applying a clean dressing, cleanse wound with normal saline, wound cleanser, or mild soap and water. Ask the physician or nurse before getting the wound(s) wet in a shower     Daily Wound management:              Keep weight off wounds and reposition every 2 hours. Avoid standing for long periods of time. Apply wraps/stockings in AM and remove at bedtime. If swelling is present, elevate legs to the level of the heart or above for 30 minutes 4-5 times a day and/or when sitting. When taking antibiotics take entire prescription as ordered by physician do not stop taking until medicine is all gone. Orders for this week: 9/29/22        Culture of Left Lateral Ankle taken on 5/10/22. Rx: Drug [facility] in [place]        Left Lateral Ankle - Wash with soap and water, pat dry. Apply Stacy to wound bed. Cover with Silicone border. Leave in place for 1 week. Work on getting a pair of 20mmhg compression stockings.  Wear during the

## 2022-10-06 ENCOUNTER — HOSPITAL ENCOUNTER (OUTPATIENT)
Dept: WOUND CARE | Age: 52
Discharge: HOME | End: 2022-10-06
Payer: COMMERCIAL

## 2022-10-06 VITALS
RESPIRATION RATE: 18 BRPM | SYSTOLIC BLOOD PRESSURE: 184 MMHG | TEMPERATURE: 95.5 F | HEART RATE: 80 BPM | DIASTOLIC BLOOD PRESSURE: 86 MMHG

## 2022-10-06 DIAGNOSIS — T14.8XXA NONHEALING NONSURGICAL WOUND WITH FAT LAYER EXPOSED: Primary | ICD-10-CM

## 2022-10-06 DIAGNOSIS — L97.322 SKIN ULCER OF LEFT ANKLE WITH FAT LAYER EXPOSED (HCC): ICD-10-CM

## 2022-10-06 PROCEDURE — 11042 DBRDMT SUBQ TIS 1ST 20SQCM/<: CPT

## 2022-10-06 PROCEDURE — 11042 DBRDMT SUBQ TIS 1ST 20SQCM/<: CPT | Performed by: NURSE PRACTITIONER

## 2022-10-06 RX ORDER — BACITRACIN ZINC AND POLYMYXIN B SULFATE 500; 1000 [USP'U]/G; [USP'U]/G
OINTMENT TOPICAL ONCE
Status: CANCELLED | OUTPATIENT
Start: 2022-10-06 | End: 2022-10-06

## 2022-10-06 RX ORDER — BACITRACIN, NEOMYCIN, POLYMYXIN B 400; 3.5; 5 [USP'U]/G; MG/G; [USP'U]/G
OINTMENT TOPICAL ONCE
Status: CANCELLED | OUTPATIENT
Start: 2022-10-06 | End: 2022-10-06

## 2022-10-06 RX ORDER — CLOBETASOL PROPIONATE 0.5 MG/G
OINTMENT TOPICAL ONCE
Status: CANCELLED | OUTPATIENT
Start: 2022-10-06 | End: 2022-10-06

## 2022-10-06 RX ORDER — GINSENG 100 MG
CAPSULE ORAL ONCE
Status: CANCELLED | OUTPATIENT
Start: 2022-10-06 | End: 2022-10-06

## 2022-10-06 RX ORDER — LIDOCAINE HYDROCHLORIDE 40 MG/ML
SOLUTION TOPICAL ONCE
Status: CANCELLED | OUTPATIENT
Start: 2022-10-06 | End: 2022-10-06

## 2022-10-06 RX ORDER — LIDOCAINE 50 MG/G
OINTMENT TOPICAL ONCE
Status: CANCELLED | OUTPATIENT
Start: 2022-10-06 | End: 2022-10-06

## 2022-10-06 RX ORDER — BETAMETHASONE DIPROPIONATE 0.05 %
OINTMENT (GRAM) TOPICAL ONCE
Status: CANCELLED | OUTPATIENT
Start: 2022-10-06 | End: 2022-10-06

## 2022-10-06 RX ORDER — LIDOCAINE 40 MG/G
CREAM TOPICAL ONCE
Status: CANCELLED | OUTPATIENT
Start: 2022-10-06 | End: 2022-10-06

## 2022-10-06 RX ORDER — GENTAMICIN SULFATE 1 MG/G
OINTMENT TOPICAL ONCE
Status: CANCELLED | OUTPATIENT
Start: 2022-10-06 | End: 2022-10-06

## 2022-10-06 ASSESSMENT — PAIN - FUNCTIONAL ASSESSMENT: PAIN_FUNCTIONAL_ASSESSMENT: ACTIVITIES ARE NOT PREVENTED

## 2022-10-06 ASSESSMENT — PAIN SCALES - GENERAL: PAINLEVEL_OUTOF10: 0

## 2022-10-06 NOTE — DISCHARGE INSTRUCTIONS
PHYSICIAN ORDERS AND DISCHARGE INSTRUCTIONS     NOTE: Upon discharge from the [address], you will receive a patient experience survey. We would be grateful if you would take the time to fill this survey out. Wound care order history:                 KERI's   Right  0.56     Left 0.5              Date: 5/10/22              Cultures:                 Grafts:  Applied for grafts 7/12/22              Antibiotics:           Continuing wound care orders and information:                 Residence:                Continue home health care with:              Your wound-care supplies will be provided by: Wound cleansing:              Do not scrub or use excessive force. Wash hands with soap and water before and after dressing changes. Prior to applying a clean dressing, cleanse wound with normal saline, wound cleanser, or mild soap and water. Ask the physician or nurse before getting the wound(s) wet in a shower     Daily Wound management:              Keep weight off wounds and reposition every 2 hours. Avoid standing for long periods of time. Apply wraps/stockings in AM and remove at bedtime. If swelling is present, elevate legs to the level of the heart or above for 30 minutes 4-5 times a day and/or when sitting. When taking antibiotics take entire prescription as ordered by physician do not stop taking until medicine is all gone. Orders for this week: 10/6/22        Culture of Left Lateral Ankle taken on 5/10/22. Rx: Drug [facility] in [place]        Left Lateral Ankle - Wash with soap and water, pat dry. Apply Stacy to wound bed. Cover with Silicone border. Leave in place for 1 week. Work on getting a pair of 20 mm/hg compression stockings. Wear during the day, may remove at night. WOUND VAC Discontinued 7/26/22.  May return to [facility].           Follow up with Tiffany Eddy CNP in 1 week in the wound care center. Call [phone] for any questions or concerns.   Date__________   Time__________

## 2022-10-06 NOTE — PROGRESS NOTES
Wound Care Center Progress Note With Procedure    Kailyn Baires  AGE: 50 y.o. GENDER: male  : 1970  EPISODE DATE:  10/6/2022     Subjective:     Chief Complaint   Patient presents with    Wound Check     Left Ankle         HISTORY of PRESENT ILLNESS   Kailyn Baires is a 54 y.o. male who presents to the [address] [place] [address] for evaluation and treatment of Chronic non-healing surgical  ulcer(s) of the left lateral ankle. The condition is of moderate severity. The ulcer has been present since 22 post left ankle hardware removal (related to pain) per Dr. Stephen Lei. He initially had left ankle open reduction internal fixation left ankle per Dr. Stephen Lei 21. The underlying cause was initially nonhealing surgical but related to the length of time the wound has been present and venous components to his wound it is now venous in nature with edema. The patients care to date has included evaluation and follow per Dr. Lety Hernández. The patient has significant underlying medical conditions as below. Wound Pain Timing/Severity: intermittent, mild  Quality of pain: dull, tender  Severity of pain:  2 / 10   Modifying Factors: edema, venous stasis, obesity and smoking  Associated Signs/Symptoms: edema, erythema, drainage and pain     KERI: Right 0.56, left 0.5 as of 5/10/22     Arterial evaluation: based on wound; locations, assessment and healing progress        Venous Evaluation: based on wound; locations, assessment and healing progress     Wound infection: wound culture obtained 5/10/22     Diabetes: No  Smoking: Yes (approximately 1 pack per day). Patient counseled in length on smoking cessation including benefits of quitting and health risks of continuing to smoke including but not limited to lung cancer, COPD, risk of coronary artery disease. Patient verbalized understanding today, is not ready to quit.    Anticoagulant/Antiplatelet therapy: Low dose asprin  Immunosuppression: No  Obesity: Yes  Nutritional status:  well nourished. Discussed need for increased protein and calories for wound healing and good sources of protein (just over 7 grams for every 20 pounds of body weight). Animal-based foods high in protein (meat, poultry, fish, eggs, and dairy foods). Plant based foods high in protein (tofu, lentils, beans, chickpeas, nuts, quinoa and abeba seeds. Other History:     Patient educated on the 6 essential components necessary for wound healing: Circulation, Debridements, Proper Dressings and Topical Wound Products, Infection Control, Edema Control and Offloading. Patient educated on those factors that negatively effect or impact wound healing: smoking, obesity, uncontrolled diabetes, anticoagulant and immunosuppressive regimens, inadequate nutrition, untreated arterial and venous disease if applicable and measures to manage edema.           PAST MEDICAL HISTORY        Diagnosis Date    Fracture     in ER 7/24/2021 fx left ankle- for surg 7/30/2021    History of alcohol abuse     Pericarditis     \"in 2015\" no longer forllow with cardiologist    Sleep apnea     had sleep study 2014- uses cpap       PAST SURGICAL HISTORY    Past Surgical History:   Procedure Laterality Date    ANKLE FRACTURE SURGERY Left 7/30/2021    LEFT ANKLE OPEN REDUCTION INTERNAL FIXATION performed by Rica Wood MD at [address] [facility] Left 1/19/2022    LEFT ANKLE HARDWARE REMOVAL performed by Rica Wood MD at [address]    Family History   Problem Relation Age of Onset    Breast Cancer Mother     Cancer Father         lung cancer       SOCIAL HISTORY    Social History     Tobacco Use    Smoking status: Every Day     Packs/day: 1.50     Years: 25.00     Pack years: 37.50     Types: Cigarettes    Smokeless tobacco: Never    Tobacco comments:     instructed adverse effects   Vaping Use    Vaping Use: Never used   Substance Use Topics    Alcohol use: Yes     Comment: average\" 5-6 beers per day\"    Drug use: Not Currently Types: Marijuana (Weed)     Comment: \"20+ yrs ago \"       ALLERGIES    No Known Allergies    MEDICATIONS    Current Outpatient Medications on File Prior to Encounter   Medication Sig Dispense Refill    Naproxen Sodium 220 MG CAPS Take 1 tablet by mouth daily as needed for Pain       No current facility-administered medications on file prior to encounter. REVIEW OF SYSTEMS    Pertinent items are noted in HPI. Constitutional: Negative for systemic symptoms including fever, chills and malaise. Objective:      BP (!) 184/86   Pulse 80   Temp (!) 95.5 °F (35.3 °C) (Temporal)   Resp 18     PHYSICAL EXAM    General: The patient is in no acute distress. Mental status:  Patient is appropriate, is  oriented to place and plan of care. Dermatologic exam: Visual inspection of the periwound reveals the skin to be edematous  Wound exam: see wound description below in procedure note      Assessment:     Problem List Items Addressed This Visit          Other    WD-Nonhealing nonsurgical wound with fat layer exposed - Primary    Relevant Orders    Initiate Outpatient Wound Care Protocol    WD-Skin ulcer of ankle with fat layer exposed ([address]    Relevant Orders    Initiate Outpatient Wound Care Protocol     Procedure Note    Indications:  Based on my examination of this patient's wound(s) today, sharp excision into necrotic subcutaneous tissue is required to promote healing and evaluate the extent of previous healing. Performed by: KISHA Sullivan - CNP    Consent obtained: Yes    Time out taken:  Yes    Pain Control:       Debridement:Excisional Debridement    Using curette the wound(s) was/were sharply debrided down through and including the removal of subcutaneous tissue.         Devitalized Tissue Debrided:  slough and exudate    Pre Debridement Measurements:  Are located in the Wound Documentation Flow Sheet    All active wounds listed below with today's date are evaluated  Wound(s)    debrided this date include # : 1     Post  Debridement Measurements:  Wound 05/10/22 Pretibial Left;Lateral (Active)   Wound Image   10/06/22 1521   Wound Etiology Non-Healing Surgical 10/06/22 1521   Dressing Status New dressing applied 10/06/22 1540   Wound Cleansed Wound cleanser 10/06/22 1521   Offloading for Diabetic Foot Ulcers Offloading not ordered 10/06/22 1521   Wound Length (cm) 2.5 cm 10/06/22 1521   Wound Width (cm) 0.6 cm 10/06/22 1521   Wound Depth (cm) 0.1 cm 10/06/22 1521   Wound Surface Area (cm^2) 1.5 cm^2 10/06/22 1521   Change in Wound Size % (l*w) 84.62 10/06/22 1521   Wound Volume (cm^3) 0.15 cm^3 10/06/22 1521   Wound Healing % 92 10/06/22 1521   Post-Procedure Length (cm) 2.5 cm 10/06/22 1540   Post-Procedure Width (cm) 0.6 cm 10/06/22 1540   Post-Procedure Depth (cm) 0.1 cm 10/06/22 1540   Post-Procedure Surface Area (cm^2) 1.5 cm^2 10/06/22 1540   Post-Procedure Volume (cm^3) 0.15 cm^3 10/06/22 1540   Distance Tunneling (cm) 0 cm 10/06/22 1521   Tunneling Position ___ O'Clock 0 10/06/22 1521   Undermining Starts ___ O'Clock 0 10/06/22 1521   Undermining Ends___ O'Clock 0 10/06/22 1521   Undermining Maxium Distance (cm) 0 10/06/22 1521   Wound Assessment Pink/red 10/06/22 1521   Drainage Amount Scant 10/06/22 1521   Drainage Description Yellow 10/06/22 1521   Odor None 10/06/22 1521   Reyna-wound Assessment Intact;Fragile 10/06/22 1521   Margins Attached edges 10/06/22 1521   Wound Thickness Description not for Pressure Injury Full thickness 10/06/22 1521   Number of days: 149     Percent of Wound(s) Debrided: approximately 100%    Total  Area  Debrided: 1.5 sq cm     Bleeding:  Minimal    Hemostasis Achieved:  by pressure    Procedural Pain:  0  / 10     Post Procedural Pain:  0 / 10     Response to treatment:  Well tolerated by patient. Status of wound progress and description from last visit: Larger today, has not been using collagen and concerned about inconsistent use of compression.  Reinforced importance of following the regimen. Follow up in one week. Plan:       Discharge Instructions         PHYSICIAN ORDERS AND DISCHARGE INSTRUCTIONS     NOTE: Upon discharge from the [address], you will receive a patient experience survey. We would be grateful if you would take the time to fill this survey out. Wound care order history:                 KERI's   Right  0.56     Left 0.5              Date: 5/10/22              Cultures:                 Grafts:  Applied for grafts 7/12/22              Antibiotics:           Continuing wound care orders and information:                 Residence:                Continue home health care with:              Your wound-care supplies will be provided by: Wound cleansing:              Do not scrub or use excessive force. Wash hands with soap and water before and after dressing changes. Prior to applying a clean dressing, cleanse wound with normal saline, wound cleanser, or mild soap and water. Ask the physician or nurse before getting the wound(s) wet in a shower     Daily Wound management:              Keep weight off wounds and reposition every 2 hours. Avoid standing for long periods of time. Apply wraps/stockings in AM and remove at bedtime. If swelling is present, elevate legs to the level of the heart or above for 30 minutes 4-5 times a day and/or when sitting. When taking antibiotics take entire prescription as ordered by physician do not stop taking until medicine is all gone. Orders for this week: 10/6/22        Culture of Left Lateral Ankle taken on 5/10/22. Rx: Drug [facility] in [place]        Left Lateral Ankle - Wash with soap and water, pat dry. Apply Stacy to wound bed. Cover with Silicone border. Leave in place for 1 week.      Work on getting a pair of 20 mm/hg compression stockings. Wear during the day, may remove at night. WOUND VAC Discontinued 7/26/22. May return to [facility]. Follow up with Sri Cr CNP in 1 week in the wound care center. Call [phone] for any questions or concerns.   Date__________   Time__________        Treatment Note Wound 05/10/22 Pretibial Left;Lateral-Dressing/Treatment:  (Stacy, Calcium Alginate, Gentac Border)    Written Patient Dismissal Instructions Given            Electronically signed by KISHA Rosales CNP on 10/6/2022 at 4:34 PM

## 2022-10-13 ENCOUNTER — HOSPITAL ENCOUNTER (OUTPATIENT)
Dept: WOUND CARE | Age: 52
Discharge: HOME | End: 2022-10-13
Payer: MEDICARE

## 2022-10-13 VITALS — RESPIRATION RATE: 18 BRPM | TEMPERATURE: 97.8 F | DIASTOLIC BLOOD PRESSURE: 81 MMHG | SYSTOLIC BLOOD PRESSURE: 175 MMHG

## 2022-10-13 DIAGNOSIS — T14.8XXA NONHEALING NONSURGICAL WOUND WITH FAT LAYER EXPOSED: Primary | ICD-10-CM

## 2022-10-13 DIAGNOSIS — L97.322 SKIN ULCER OF LEFT ANKLE WITH FAT LAYER EXPOSED (HCC): ICD-10-CM

## 2022-10-13 PROCEDURE — 11042 DBRDMT SUBQ TIS 1ST 20SQCM/<: CPT | Performed by: NURSE PRACTITIONER

## 2022-10-13 PROCEDURE — 11042 DBRDMT SUBQ TIS 1ST 20SQCM/<: CPT

## 2022-10-13 RX ORDER — LIDOCAINE 40 MG/G
CREAM TOPICAL ONCE
OUTPATIENT
Start: 2022-10-13 | End: 2022-10-13

## 2022-10-13 RX ORDER — GINSENG 100 MG
CAPSULE ORAL ONCE
OUTPATIENT
Start: 2022-10-13 | End: 2022-10-13

## 2022-10-13 RX ORDER — BETAMETHASONE DIPROPIONATE 0.05 %
OINTMENT (GRAM) TOPICAL ONCE
OUTPATIENT
Start: 2022-10-13 | End: 2022-10-13

## 2022-10-13 RX ORDER — BACITRACIN ZINC AND POLYMYXIN B SULFATE 500; 1000 [USP'U]/G; [USP'U]/G
OINTMENT TOPICAL ONCE
OUTPATIENT
Start: 2022-10-13 | End: 2022-10-13

## 2022-10-13 RX ORDER — CLOBETASOL PROPIONATE 0.5 MG/G
OINTMENT TOPICAL ONCE
OUTPATIENT
Start: 2022-10-13 | End: 2022-10-13

## 2022-10-13 RX ORDER — BACITRACIN, NEOMYCIN, POLYMYXIN B 400; 3.5; 5 [USP'U]/G; MG/G; [USP'U]/G
OINTMENT TOPICAL ONCE
OUTPATIENT
Start: 2022-10-13 | End: 2022-10-13

## 2022-10-13 RX ORDER — LIDOCAINE 50 MG/G
OINTMENT TOPICAL ONCE
OUTPATIENT
Start: 2022-10-13 | End: 2022-10-13

## 2022-10-13 RX ORDER — GENTAMICIN SULFATE 1 MG/G
OINTMENT TOPICAL ONCE
OUTPATIENT
Start: 2022-10-13 | End: 2022-10-13

## 2022-10-13 RX ORDER — LIDOCAINE HYDROCHLORIDE 40 MG/ML
SOLUTION TOPICAL ONCE
OUTPATIENT
Start: 2022-10-13 | End: 2022-10-13

## 2022-10-13 ASSESSMENT — PAIN DESCRIPTION - LOCATION: LOCATION: ANKLE

## 2022-10-13 ASSESSMENT — PAIN DESCRIPTION - ORIENTATION: ORIENTATION: LEFT

## 2022-10-13 ASSESSMENT — PAIN SCALES - GENERAL: PAINLEVEL_OUTOF10: 0

## 2022-10-13 NOTE — LETTER
[facility]  [address] [place] [facility] [address]  Phone: [phone]    KISHA Davis CNP        October 13, 2022     Patient: Yany Mcmillan   YOB: 1970   Date of Visit: 10/13/2022       To Whom It May Concern: It is my medical opinion that Yany Mcmillan may return to light duty immediately. Client is to remain light duty for one month due to wound on left lateral ankle, which is an area of high flexibility and movement with a high risk of dehiscence. If you have any questions or concerns, please don't hesitate to call.     Sincerely,        KISHA Davis CNP

## 2022-10-13 NOTE — PROGRESS NOTES
Wound Care Center Progress Note With Procedure    Torri Sosa  AGE: 50 y.o. GENDER: male  : 1970  EPISODE DATE:  10/13/2022     Subjective:     Chief Complaint   Patient presents with    Wound Check         HISTORY of PRESENT ILLNESS   Torri Sosa is a 54 y.o. male who presents to the [address] [place] [address] for evaluation and treatment of Chronic non-healing surgical  ulcer(s) of the left lateral ankle. The condition is of moderate severity. The ulcer has been present since 22 post left ankle hardware removal (related to pain) per Dr. Jey Galdamez. He initially had left ankle open reduction internal fixation left ankle per Dr. Jey Galdamez 21. The underlying cause was initially nonhealing surgical but related to the length of time the wound has been present and venous components to his wound it is now venous in nature with edema. The patients care to date has included evaluation and follow per Dr. Jey Galdamez. The patient has significant underlying medical conditions as below. Wound Pain Timing/Severity: intermittent, mild  Quality of pain: dull, tender  Severity of pain:  2  10   Modifying Factors: edema, venous stasis, obesity and smoking  Associated Signs/Symptoms: edema, erythema, drainage and pain     KERI: Right 0.56, left 0.5 as of 5/10/22     Arterial evaluation: based on wound; locations, assessment and healing progress        Venous Evaluation: based on wound; locations, assessment and healing progress     Wound infection: wound culture obtained 5/10/22     Diabetes: No  Smoking: Yes (approximately 1 pack per day). Patient counseled in length on smoking cessation including benefits of quitting and health risks of continuing to smoke including but not limited to lung cancer, COPD, risk of coronary artery disease. Patient verbalized understanding today, is not ready to quit. Anticoagulant/Antiplatelet therapy: Low dose asprin  Immunosuppression: No  Obesity: Yes  Nutritional status:  well nourished. Discussed need for increased protein and calories for wound healing and good sources of protein (just over 7 grams for every 20 pounds of body weight). Animal-based foods high in protein (meat, poultry, fish, eggs, and dairy foods). Plant based foods high in protein (tofu, lentils, beans, chickpeas, nuts, quinoa and abeba seeds. Other History:     Patient educated on the 6 essential components necessary for wound healing: Circulation, Debridements, Proper Dressings and Topical Wound Products, Infection Control, Edema Control and Offloading. Patient educated on those factors that negatively effect or impact wound healing: smoking, obesity, uncontrolled diabetes, anticoagulant and immunosuppressive regimens, inadequate nutrition, untreated arterial and venous disease if applicable and measures to manage edema.           PAST MEDICAL HISTORY        Diagnosis Date    Fracture     in ER 7/24/2021 fx left ankle- for surg 7/30/2021    History of alcohol abuse     Pericarditis     \"in 2015\" no longer forllow with cardiologist    Sleep apnea     had sleep study 2014- uses cpap       PAST SURGICAL HISTORY    Past Surgical History:   Procedure Laterality Date    ANKLE FRACTURE SURGERY Left 7/30/2021    LEFT ANKLE OPEN REDUCTION INTERNAL FIXATION performed by Dewitt Nageotte, MD at [facility] Left 1/19/2022    LEFT ANKLE HARDWARE REMOVAL performed by Dewitt Nageotte, MD at [address] [facility]    Family History   Problem Relation Age of Onset    Breast Cancer Mother     Cancer Father         lung cancer       SOCIAL HISTORY    Social History     Tobacco Use    Smoking status: Every Day     Packs/day: 1.50     Years: 25.00     Pack years: 37.50     Types: Cigarettes    Smokeless tobacco: Never    Tobacco comments:     instructed adverse effects   Vaping Use    Vaping Use: Never used   Substance Use Topics    Alcohol use: Yes     Comment: average\" 5-6 beers per day\"    Drug use: Not Currently     Types: Marijuana (Weed)     Comment: \"20+ yrs ago \"       ALLERGIES    No Known Allergies    MEDICATIONS    Current Outpatient Medications on File Prior to Encounter   Medication Sig Dispense Refill    Naproxen Sodium 220 MG CAPS Take 1 tablet by mouth daily as needed for Pain       No current facility-administered medications on file prior to encounter. REVIEW OF SYSTEMS    Pertinent items are noted in HPI. Constitutional: Negative for systemic symptoms including fever, chills and malaise. Objective:      BP (!) 175/81   Temp 97.8 °F (36.6 °C) (Temporal)   Resp 18     PHYSICAL EXAM    General: The patient is in no acute distress. Mental status:  Patient is appropriate, is  oriented to place and plan of care. Dermatologic exam: Visual inspection of the periwound reveals the skin to be edematous  Wound exam: see wound description below in procedure note      Assessment:     Problem List Items Addressed This Visit          Other    WD-Nonhealing nonsurgical wound with fat layer exposed - Primary    Relevant Orders    Initiate Outpatient Wound Care Protocol    WD-Skin ulcer of ankle with fat layer exposed ([address]    Relevant Orders    Initiate Outpatient Wound Care Protocol     Procedure Note    Indications:  Based on my examination of this patient's wound(s) today, sharp excision into necrotic subcutaneous tissue is required to promote healing and evaluate the extent of previous healing. Performed by: KISHA Humphrey - CNP    Consent obtained: Yes    Time out taken:  Yes    Pain Control:       Debridement:Excisional Debridement    Using curette the wound(s) was/were sharply debrided down through and including the removal of subcutaneous tissue.         Devitalized Tissue Debrided:  slough and exudate    Pre Debridement Measurements:  Are located in the Wound Documentation Flow Sheet    All active wounds listed below with today's date are evaluated  Wound(s)    debrided this date include # : 1 Post  Debridement Measurements:  Wound 05/10/22 Pretibial Left;Lateral (Active)   Wound Image   10/06/22 1521   Wound Etiology Non-Healing Surgical 10/13/22 1520   Dressing Status New dressing applied;Clean;Dry; Intact 10/13/22 1530   Wound Cleansed Soap and water 10/13/22 1520   Offloading for Diabetic Foot Ulcers Offloading not required 10/13/22 1530   Wound Length (cm) 1 cm 10/13/22 1520   Wound Width (cm) 0.5 cm 10/13/22 1520   Wound Depth (cm) 0.1 cm 10/13/22 1520   Wound Surface Area (cm^2) 0.5 cm^2 10/13/22 1520   Change in Wound Size % (l*w) 94.87 10/13/22 1520   Wound Volume (cm^3) 0.05 cm^3 10/13/22 1520   Wound Healing % 97 10/13/22 1520   Post-Procedure Length (cm) 0.1 cm 10/13/22 1528   Post-Procedure Width (cm) 0.2 cm 10/13/22 1528   Post-Procedure Depth (cm) 0.1 cm 10/13/22 1528   Post-Procedure Surface Area (cm^2) 0.02 cm^2 10/13/22 1528   Post-Procedure Volume (cm^3) 0.002 cm^3 10/13/22 1528   Distance Tunneling (cm) 0 cm 10/13/22 1520   Tunneling Position ___ O'Clock 0 10/13/22 1520   Undermining Starts ___ O'Clock 0 10/13/22 1520   Undermining Ends___ O'Clock 0 10/13/22 1520   Undermining Maxium Distance (cm) 0 10/13/22 1520   Wound Assessment Pink/red 10/13/22 1520   Drainage Amount Scant 10/13/22 1520   Drainage Description Yellow 10/13/22 1520   Odor None 10/13/22 1520   Reyna-wound Assessment Intact;Fragile 10/13/22 1520   Margins Attached edges 10/13/22 1520   Wound Thickness Description not for Pressure Injury Full thickness 10/13/22 1520   Number of days: 156     Percent of Wound(s) Debrided: approximately 100%    Total  Area  Debrided: 0.02 sq cm     Bleeding:  Minimal    Hemostasis Achieved:  by pressure    Procedural Pain:  0  / 10     Post Procedural Pain:  0 / 10     Response to treatment:  Well tolerated by patient. Status of wound progress and description from last visit: smaller today, using collagen and now has home compression socks. Will continue use of collagen.  Follow up in two weeks. The wound is in an area of the ankle with significant flexion with a high risk for dehiscence. Will stay on light duty for work for now because he needs to wear high top work boots. Plan:       Discharge Instructions         PHYSICIAN ORDERS AND DISCHARGE INSTRUCTIONS     NOTE: Upon discharge from the [address], you will receive a patient experience survey. We would be grateful if you would take the time to fill this survey out. Wound care order history:                 KERI's   Right  0.56     Left 0.5              Date: 5/10/22              Cultures:                 Grafts:  Applied for grafts 7/12/22              Antibiotics:           Continuing wound care orders and information:                 Residence:                Continue home health care with:              Your wound-care supplies will be provided by: Wound cleansing:              Do not scrub or use excessive force. Wash hands with soap and water before and after dressing changes. Prior to applying a clean dressing, cleanse wound with normal saline, wound cleanser, or mild soap and water. Ask the physician or nurse before getting the wound(s) wet in a shower     Daily Wound management:              Keep weight off wounds and reposition every 2 hours. Avoid standing for long periods of time. Apply wraps/stockings in AM and remove at bedtime. If swelling is present, elevate legs to the level of the heart or above for 30 minutes 4-5 times a day and/or when sitting. When taking antibiotics take entire prescription as ordered by physician do not stop taking until medicine is all gone. Orders for this week: 10/13/22        Rx: [facility] in [place]        Left Lateral Ankle - Wash with soap and water, pat dry. Apply Stacy to wound bed. Cover with Silicone border. Leave in place for 1 week and change. Work on getting a pair of 20 mm/hg compression stockings. Wear during the day, may remove at night. WOUND VAC Discontinued 7/26/22. May return to [facility]. Follow up with Tania Carvalho CNP in 2 weeks in the wound care center. Call [phone] for any questions or concerns.   Date__________   Time_________      Treatment Note Wound 05/10/22 Pretibial Left;Lateral-Dressing/Treatment:  Tracey Herron,  Gentac)    Written Patient Dismissal Instructions Given            Electronically signed by KISHA Davis CNP on 10/13/2022 at 3:33 PM

## 2022-10-13 NOTE — DISCHARGE INSTRUCTIONS
CNP in 2 weeks in the wound care center. Call [phone] for any questions or concerns.   Date__________   Time_________

## 2022-10-13 NOTE — LETTER
[address] [facility]  [address] [place] [address] [address]  Phone: [phone]    KISHA Ohara CNP        October 13, 2022     Patient: Joyce Trammell   YOB: 1970   Date of Visit: 10/13/2022       To Whom It May Concern: It is my medical opinion that Joyce Trammell may return to light duty immediately. Stay on light duty for 1 month. If you have any questions or concerns, please don't hesitate to call.     Sincerely,        KISHA Ohara CNP

## 2022-10-27 ENCOUNTER — HOSPITAL ENCOUNTER (OUTPATIENT)
Dept: WOUND CARE | Age: 52
Discharge: HOME | End: 2022-10-27
Payer: COMMERCIAL

## 2022-10-27 VITALS
SYSTOLIC BLOOD PRESSURE: 169 MMHG | RESPIRATION RATE: 18 BRPM | TEMPERATURE: 96 F | HEART RATE: 86 BPM | DIASTOLIC BLOOD PRESSURE: 86 MMHG

## 2022-10-27 DIAGNOSIS — T14.8XXA NONHEALING NONSURGICAL WOUND WITH FAT LAYER EXPOSED: Primary | ICD-10-CM

## 2022-10-27 DIAGNOSIS — L97.322 SKIN ULCER OF LEFT ANKLE WITH FAT LAYER EXPOSED (HCC): ICD-10-CM

## 2022-10-27 PROCEDURE — 97597 DBRDMT OPN WND 1ST 20 CM/<: CPT

## 2022-10-27 PROCEDURE — 97597 DBRDMT OPN WND 1ST 20 CM/<: CPT | Performed by: NURSE PRACTITIONER

## 2022-10-27 RX ORDER — BACITRACIN ZINC AND POLYMYXIN B SULFATE 500; 1000 [USP'U]/G; [USP'U]/G
OINTMENT TOPICAL ONCE
OUTPATIENT
Start: 2022-10-27 | End: 2022-10-27

## 2022-10-27 RX ORDER — CLOBETASOL PROPIONATE 0.5 MG/G
OINTMENT TOPICAL ONCE
OUTPATIENT
Start: 2022-10-27 | End: 2022-10-27

## 2022-10-27 RX ORDER — LIDOCAINE 50 MG/G
OINTMENT TOPICAL ONCE
OUTPATIENT
Start: 2022-10-27 | End: 2022-10-27

## 2022-10-27 RX ORDER — GENTAMICIN SULFATE 1 MG/G
OINTMENT TOPICAL ONCE
OUTPATIENT
Start: 2022-10-27 | End: 2022-10-27

## 2022-10-27 RX ORDER — BACITRACIN, NEOMYCIN, POLYMYXIN B 400; 3.5; 5 [USP'U]/G; MG/G; [USP'U]/G
OINTMENT TOPICAL ONCE
OUTPATIENT
Start: 2022-10-27 | End: 2022-10-27

## 2022-10-27 RX ORDER — LIDOCAINE HYDROCHLORIDE 40 MG/ML
SOLUTION TOPICAL ONCE
OUTPATIENT
Start: 2022-10-27 | End: 2022-10-27

## 2022-10-27 RX ORDER — GINSENG 100 MG
CAPSULE ORAL ONCE
OUTPATIENT
Start: 2022-10-27 | End: 2022-10-27

## 2022-10-27 RX ORDER — LIDOCAINE 40 MG/G
CREAM TOPICAL ONCE
OUTPATIENT
Start: 2022-10-27 | End: 2022-10-27

## 2022-10-27 RX ORDER — BETAMETHASONE DIPROPIONATE 0.05 %
OINTMENT (GRAM) TOPICAL ONCE
OUTPATIENT
Start: 2022-10-27 | End: 2022-10-27

## 2022-10-27 ASSESSMENT — PAIN SCALES - GENERAL: PAINLEVEL_OUTOF10: 0

## 2022-10-27 NOTE — PROGRESS NOTES
Wound Care Center Progress Note With Procedure    Mahesh Barron  AGE: 50 y.o. GENDER: male  : 1970  EPISODE DATE:  10/27/2022     Subjective:     Chief Complaint   Patient presents with    Wound Check     Left ankle         HISTORY of PRESENT ILLNESS   Mahesh Barron is a 54 y.o. male who presents to the [address] [place] [address] for evaluation and treatment of Chronic non-healing surgical  ulcer(s) of the left lateral ankle. The condition is of moderate severity. The ulcer has been present since 22 post left ankle hardware removal (related to pain) per Dr. Bettie Rock. He initially had left ankle open reduction internal fixation left ankle per Dr. Bettie Rock 21. The underlying cause was initially nonhealing surgical but related to the length of time the wound has been present and venous components to his wound it is now venous in nature with edema. The patients care to date has included evaluation and follow per Dr. Bettie Rock. The patient has significant underlying medical conditions as below. Wound Pain Timing/Severity: intermittent, mild  Quality of pain: dull, tender  Severity of pain:  2 / 10   Modifying Factors: edema, venous stasis, obesity and smoking  Associated Signs/Symptoms: edema, erythema, drainage and pain     KERI: Right 0.56, left 0.5 as of 5/10/22     Arterial evaluation: based on wound; locations, assessment and healing progress        Venous Evaluation: based on wound; locations, assessment and healing progress     Wound infection: wound culture obtained 5/10/22     Diabetes: No  Smoking: Yes (approximately 1 pack per day). Patient counseled in length on smoking cessation including benefits of quitting and health risks of continuing to smoke including but not limited to lung cancer, COPD, risk of coronary artery disease. Patient verbalized understanding today, is not ready to quit.    Anticoagulant/Antiplatelet therapy: Low dose asprin  Immunosuppression: No  Obesity: Yes  Nutritional status:  well nourished. Discussed need for increased protein and calories for wound healing and good sources of protein (just over 7 grams for every 20 pounds of body weight). Animal-based foods high in protein (meat, poultry, fish, eggs, and dairy foods). Plant based foods high in protein (tofu, lentils, beans, chickpeas, nuts, quinoa and abeba seeds. Other History:     Patient educated on the 6 essential components necessary for wound healing: Circulation, Debridements, Proper Dressings and Topical Wound Products, Infection Control, Edema Control and Offloading. Patient educated on those factors that negatively effect or impact wound healing: smoking, obesity, uncontrolled diabetes, anticoagulant and immunosuppressive regimens, inadequate nutrition, untreated arterial and venous disease if applicable and measures to manage edema.           PAST MEDICAL HISTORY        Diagnosis Date    Fracture     in ER 7/24/2021 fx left ankle- for surg 7/30/2021    History of alcohol abuse     Pericarditis     \"in 2015\" no longer forllow with cardiologist    Sleep apnea     had sleep study 2014- uses cpap       PAST SURGICAL HISTORY    Past Surgical History:   Procedure Laterality Date    ANKLE FRACTURE SURGERY Left 7/30/2021    LEFT ANKLE OPEN REDUCTION INTERNAL FIXATION performed by Bayron Chau MD at [facility] Left 1/19/2022    LEFT ANKLE HARDWARE REMOVAL performed by Bayron Chau MD at [facility]    Family History   Problem Relation Age of Onset    Breast Cancer Mother     Cancer Father         lung cancer       SOCIAL HISTORY    Social History     Tobacco Use    Smoking status: Every Day     Packs/day: 1.50     Years: 25.00     Pack years: 37.50     Types: Cigarettes    Smokeless tobacco: Never    Tobacco comments:     instructed adverse effects   Vaping Use    Vaping Use: Never used   Substance Use Topics    Alcohol use: Yes     Comment: average\" 5-6 beers per day\"    Drug use: Not Currently Types: Marijuana (Weed)     Comment: \"20+ yrs ago \"       ALLERGIES    No Known Allergies    MEDICATIONS    Current Outpatient Medications on File Prior to Encounter   Medication Sig Dispense Refill    Naproxen Sodium 220 MG CAPS Take 1 tablet by mouth daily as needed for Pain       No current facility-administered medications on file prior to encounter. REVIEW OF SYSTEMS    Pertinent items are noted in HPI. Constitutional: Negative for systemic symptoms including fever, chills and malaise. Objective:      BP (!) 169/86   Pulse 86   Temp (!) 96 °F (35.6 °C) (Temporal)   Resp 18     PHYSICAL EXAM    General: The patient is in no acute distress. Mental status:  Patient is appropriate, is  oriented to place and plan of care. Dermatologic exam: Visual inspection of the periwound reveals the skin to be edematous  Wound exam: see wound description below in procedure note      Assessment:     Problem List Items Addressed This Visit          Other    WD-Nonhealing nonsurgical wound with fat layer exposed - Primary    Relevant Orders    Initiate Outpatient Wound Care Protocol    WD-Skin ulcer of ankle with fat layer exposed ([address]    Relevant Orders    Initiate Outpatient Wound Care Protocol     Procedure Note    Indications:  Based on my examination of this patient's wound(s) today, sharp excision into necrotic devitalized tissue is required to promote healing and evaluate the extent of previous healing.     Performed by: KISHA Butts CNP    Consent obtained: Yes    Time out taken:  Yes    Pain Control:       Debridement:Excisional Debridement    Using curette the wound(s) was/were sharply debrided down through and including the removal of devitalized tissue    Devitalized Tissue Debrided:  slough and exudate    Pre Debridement Measurements:  Are located in the Wound Documentation Flow Sheet    All active wounds listed below with today's date are evaluated  Wound(s)    debrided this date include # : 1     Post  Debridement Measurements:  Wound 05/10/22, #1 Pretibial Left;Lateral (Active)   Wound Image   10/27/22 1520   Wound Etiology Non-Healing Surgical 10/27/22 1553   Dressing Status New dressing applied;Clean;Dry; Intact 10/27/22 1553   Wound Cleansed Wound cleanser 10/27/22 1520   Offloading for Diabetic Foot Ulcers Offloading not required 10/27/22 1553   Wound Length (cm) 0.6 cm 10/27/22 1520   Wound Width (cm) 0.3 cm 10/27/22 1520   Wound Depth (cm) 0.1 cm 10/27/22 1520   Wound Surface Area (cm^2) 0.18 cm^2 10/27/22 1520   Change in Wound Size % (l*w) 98.15 10/27/22 1520   Wound Volume (cm^3) 0.018 cm^3 10/27/22 1520   Wound Healing % 99 10/27/22 1520   Post-Procedure Length (cm) 0.0 cm 10/13/22 1528   Post-Procedure Width (cm) 0.0 cm 10/13/22 1528   Post-Procedure Depth (cm) 0.0 cm 10/13/22 1528   Post-Procedure Surface Area (cm^2) 0.00 cm^2 10/13/22 1528   Post-Procedure Volume (cm^3) 0.002 cm^3 10/13/22 1528   Distance Tunneling (cm) 0 cm 10/27/22 1520   Tunneling Position ___ O'Clock 0 10/27/22 1520   Undermining Starts ___ O'Clock 0 10/27/22 1520   Undermining Ends___ O'Clock 0 10/27/22 1520   Undermining Maxium Distance (cm) 0 10/27/22 1520   Wound Assessment Devitalized tissue 10/27/22 1520   Drainage Amount None 10/27/22 1520   Drainage Description Yellow 10/13/22 1520   Odor None 10/27/22 1520   Reyna-wound Assessment Intact;Dry/flaky 10/27/22 1520   Margins Attached edges 10/27/22 1520   Wound Thickness Description not for Pressure Injury Partial thickness 10/27/22 1520   Number of days: 170     Total  Area  Debrided: 0.5 sq cm     Bleeding:  Minimal    Hemostasis Achieved:  by pressure    Procedural Pain:  0  / 10     Post Procedural Pain:  0 / 10     Response to treatment:  Well tolerated by patient. Status of wound progress and description from last visit: Healed, regimen as below. Follow up in 3 months.  The wound is in an area of the ankle with significant flexion with a high risk for dehiscence. He was given a work slip to slowly start increasing work activities and use of ankle boots at work. He will return in 3 months as he increases his activities (or sooner if problems) and we will lift restrictions at that time. Plan:       Discharge Instructions         PHYSICIAN ORDERS AND DISCHARGE INSTRUCTIONS     NOTE: Upon discharge from the [address], you will receive a patient experience survey. We would be grateful if you would take the time to fill this survey out. Wound care order history:                 KERI's   Right  0.56     Left 0.5              Date: 5/10/22              Cultures:                 Grafts:  Applied for grafts 7/12/22              Antibiotics:           Continuing wound care orders and information:                 Residence:                Continue home health care with:              Your wound-care supplies will be provided by: Wound cleansing:              Do not scrub or use excessive force. Wash hands with soap and water before and after dressing changes. Prior to applying a clean dressing, cleanse wound with normal saline, wound cleanser, or mild soap and water. Ask the physician or nurse before getting the wound(s) wet in a shower     Daily Wound management:              Keep weight off wounds and reposition every 2 hours. Avoid standing for long periods of time. Apply wraps/stockings in AM and remove at bedtime. If swelling is present, elevate legs to the level of the heart or above for 30 minutes 4-5 times a day and/or when sitting. When taking antibiotics take entire prescription as ordered by physician do not stop taking until medicine is all gone. Orders for this week: 10/27/22        Rx: [facility] in [place]        Left Lateral Ankle - Healed 10/27/22. Apply A&D.  May cover with silicone border to pad and protect. Work on getting a pair of 20 mm/hg compression stockings. Wear during the day, may remove at night. WOUND VAC Discontinued 7/26/22. May return to [facility]. Follow up with Dane John CNP in 3-4 months in the wound care center. Call [phone] for any questions or concerns.   Date__________   Time_________        Treatment Note Wound 05/10/22 Pretibial Left;Lateral-Dressing/Treatment:  (gentac A&D)    Written Patient Dismissal Instructions Given            Electronically signed by KISHA Yoder CNP on 10/27/2022 at 4:08 PM

## 2022-10-27 NOTE — DISCHARGE INSTRUCTIONS
PHYSICIAN ORDERS AND DISCHARGE INSTRUCTIONS     NOTE: Upon discharge from the [address], you will receive a patient experience survey. We would be grateful if you would take the time to fill this survey out. Wound care order history:                 KERI's   Right  0.56     Left 0.5              Date: 5/10/22              Cultures:                 Grafts:  Applied for grafts 7/12/22              Antibiotics:           Continuing wound care orders and information:                 Residence:                Continue home health care with:              Your wound-care supplies will be provided by: Wound cleansing:              Do not scrub or use excessive force. Wash hands with soap and water before and after dressing changes. Prior to applying a clean dressing, cleanse wound with normal saline, wound cleanser, or mild soap and water. Ask the physician or nurse before getting the wound(s) wet in a shower     Daily Wound management:              Keep weight off wounds and reposition every 2 hours. Avoid standing for long periods of time. Apply wraps/stockings in AM and remove at bedtime. If swelling is present, elevate legs to the level of the heart or above for 30 minutes 4-5 times a day and/or when sitting. When taking antibiotics take entire prescription as ordered by physician do not stop taking until medicine is all gone. Orders for this week: 10/27/22        Rx: [facility] in [place]        Left Lateral Ankle - Healed 10/27/22. Apply A&D. May cover with silicone border to pad and protect. Work on getting a pair of 20 mm/hg compression stockings. Wear during the day, may remove at night. WOUND VAC Discontinued 7/26/22. May return to [facility]. Follow up with Eleuterio Mujica CNP in 3-4 months in the wound care center.   Call [phone] for any questions or concerns.   Date__________   Time_________

## 2022-10-27 NOTE — LETTER
[facility]  [address] [id number]  Phone: [phone]    KISHA Rodriguez CNP        October 27, 2022     Patient: Crispin Berman   YOB: 1970   Date of Visit: 10/27/2022       To Whom It May Concern: It is my medical opinion that Crispin Berman may return to light duty immediately with the following restrictions: no high reach ladders, no excessive stairs, and no excessive walking. Transition from shoes to steel toed boots as tolerated. Follow up in 120 days for full release back to work. If you have any questions or concerns, please don't hesitate to call.     Sincerely,        KISHA Rodriguez CNP

## 2022-11-16 ENCOUNTER — TELEPHONE (OUTPATIENT)
Dept: WOUND CARE | Age: 52
End: 2022-11-16

## 2022-12-05 ENCOUNTER — TELEPHONE (OUTPATIENT)
Dept: WOUND CARE | Age: 52
End: 2022-12-05

## 2022-12-20 ENCOUNTER — TELEPHONE (OUTPATIENT)
Dept: WOUND CARE | Age: 52
End: 2022-12-20

## 2022-12-20 NOTE — TELEPHONE ENCOUNTER
This SN called patient on 12/15/22 to follow up on status of wounds; left message for patient to call the [address] [place] [address] Road to schedule an appointment.

## (undated) DEVICE — SYRINGE IRRIG 60ML SFT PLIABLE BLB EZ TO GRP 1 HND USE W/

## (undated) DEVICE — C-ARM: Brand: UNBRANDED

## (undated) DEVICE — TRAY PREP DRY W/ PREM GLV 2 APPL 6 SPNG 2 UNDPD 1 OVERWRAP

## (undated) DEVICE — 3M™ STERI-DRAPE™ U-DRAPE 1015: Brand: STERI-DRAPE™

## (undated) DEVICE — COUNTER NDL 30 COUNT FOAM STRP SGL MAG

## (undated) DEVICE — DRAPE SHEET ULTRAGARD: Brand: MEDLINE

## (undated) DEVICE — APPLICATOR MEDICATED 26 CC SOLUTION HI LT ORNG CHLORAPREP

## (undated) DEVICE — TAPE CAST W12.5CMXL3.6M PNK FBRGLS H2O ACT POLYUR RESIN

## (undated) DEVICE — 4-PORT MANIFOLD: Brand: NEPTUNE 2

## (undated) DEVICE — GLOVE SURG SZ 75 L12IN THK75MIL DK GRN LTX FREE

## (undated) DEVICE — BIT DRL L110MM DIA3.5MM QUIK CPL W/O STP REUSE

## (undated) DEVICE — INTENDED FOR TISSUE SEPARATION, AND OTHER PROCEDURES THAT REQUIRE A SHARP SURGICAL BLADE TO PUNCTURE OR CUT.: Brand: BARD-PARKER ® STAINLESS STEEL BLADES

## (undated) DEVICE — ELECTRODE ES AD CRDLSS PT RET REM POLYHESIVE

## (undated) DEVICE — DRAPE,U/ SHT,SPLIT,PLAS,STERIL: Brand: MEDLINE

## (undated) DEVICE — DRESSING,GAUZE,XEROFORM,CURAD,1"X8",ST: Brand: CURAD

## (undated) DEVICE — TUBING, SUCTION, 9/32" X 10', STRAIGHT: Brand: MEDLINE

## (undated) DEVICE — SYRINGE MED 10ML LUERLOCK TIP W/O SFTY DISP

## (undated) DEVICE — SPONGE GZ W4XL8IN COT WVN 12 PLY

## (undated) DEVICE — DRAPE,EXTREMITY,89X128,STERILE: Brand: MEDLINE

## (undated) DEVICE — MARKER SURG SKIN UTIL REGULAR/FINE 2 TIP W/ RUL AND 9 LBL

## (undated) DEVICE — SOLUTION IV IRRIG WATER 1000ML POUR BRL 2F7114

## (undated) DEVICE — YANKAUER,FLEXIBLE HANDLE,REGLR CAPACITY: Brand: MEDLINE INDUSTRIES, INC.

## (undated) DEVICE — Device

## (undated) DEVICE — BANDAGE COMPR W4INXL15FT BGE E SGL LAYERED CLP CLSR

## (undated) DEVICE — PENCIL ES CRD L10FT HND SWCHING ROCK SWCH W/ EDGE COAT BLDE

## (undated) DEVICE — SUTURE ETHLN SZ 3-0 L30IN NONABSORBABLE BLK FS-1 L24MM 3/8 669H

## (undated) DEVICE — PADDING,UNDERCAST,COTTON, 4"X4YD STERILE: Brand: MEDLINE

## (undated) DEVICE — ALCOHOL RUBBING ISO 16OZ 70%

## (undated) DEVICE — SPONGE LAP W18XL18IN WHT COT 4 PLY FLD STRUNG RADPQ DISP ST

## (undated) DEVICE — CONTAINER,SPECIMEN,OR STERILE,4OZ: Brand: MEDLINE

## (undated) DEVICE — BIT DRL L140MM DIA2MM QUIK CPL 3 FLUT CALIB DEPTH MRK W/O

## (undated) DEVICE — BIT DRL L200MM DIA2.8MM CALIB L100MM FOR 3.5MM VA LCP PROX

## (undated) DEVICE — SUTURE VCRL SZ 2-0 L18IN ABSRB UD CT-1 L36MM 1/2 CIR J839D

## (undated) DEVICE — PAD,ABDOMINAL,5"X9",ST,LF,25/BX: Brand: MEDLINE INDUSTRIES, INC.

## (undated) DEVICE — GLOVE SURG SZ 65 CRM LTX FREE POLYISOPRENE POLYMER BEAD ANTI

## (undated) DEVICE — ZIMMER® STERILE DISPOSABLE TOURNIQUET CUFF WITH PLC, DUAL PORT, SINGLE BLADDER, 30 IN. (76 CM)

## (undated) DEVICE — PACK,BASIC,SIRUS,V: Brand: MEDLINE

## (undated) DEVICE — BIT DRL L110MM DIA2.5MM G QUIK CPL W/O STP REUSE

## (undated) DEVICE — TOWEL,OR,DSP,ST,BLUE,STD,6/PK,12PK/CS: Brand: MEDLINE

## (undated) DEVICE — PADDING CAST W6INXL4YD COT LO LINTING WYTEX

## (undated) DEVICE — MARKER SURG SKIN UTIL BLK REG TIP NONSMEARING W/ 6IN RUL

## (undated) DEVICE — BANDAGE COMPR M W6INXL10YD WHT BGE VELC E MTRX HK AND LOOP

## (undated) DEVICE — BANDAGE,SELF ADHRNT,COFLEX,4"X5YD,STRL: Brand: COLABEL